# Patient Record
Sex: FEMALE | Race: WHITE | NOT HISPANIC OR LATINO | Employment: UNEMPLOYED | URBAN - NONMETROPOLITAN AREA
[De-identification: names, ages, dates, MRNs, and addresses within clinical notes are randomized per-mention and may not be internally consistent; named-entity substitution may affect disease eponyms.]

---

## 2017-04-07 RX ORDER — DICYCLOMINE HCL 20 MG
20 TABLET ORAL DAILY
COMMUNITY
End: 2018-12-19

## 2017-04-07 RX ORDER — MONTELUKAST SODIUM 4 MG/1
1 TABLET, CHEWABLE ORAL DAILY
Status: ON HOLD | COMMUNITY
End: 2017-04-14

## 2017-04-14 ENCOUNTER — ANESTHESIA EVENT (OUTPATIENT)
Dept: GASTROENTEROLOGY | Facility: HOSPITAL | Age: 72
End: 2017-04-14

## 2017-04-14 ENCOUNTER — ANESTHESIA (OUTPATIENT)
Dept: GASTROENTEROLOGY | Facility: HOSPITAL | Age: 72
End: 2017-04-14

## 2017-04-14 ENCOUNTER — HOSPITAL ENCOUNTER (OUTPATIENT)
Facility: HOSPITAL | Age: 72
Setting detail: HOSPITAL OUTPATIENT SURGERY
Discharge: HOME | End: 2017-04-14
Attending: INTERNAL MEDICINE | Admitting: INTERNAL MEDICINE

## 2017-04-14 VITALS
RESPIRATION RATE: 18 BRPM | DIASTOLIC BLOOD PRESSURE: 59 MMHG | TEMPERATURE: 97.3 F | HEIGHT: 64 IN | BODY MASS INDEX: 32.56 KG/M2 | SYSTOLIC BLOOD PRESSURE: 123 MMHG | WEIGHT: 190.7 LBS | HEART RATE: 60 BPM | OXYGEN SATURATION: 96 %

## 2017-04-14 DIAGNOSIS — R19.7 ABDOMINAL PAIN, VOMITING, AND DIARRHEA: ICD-10-CM

## 2017-04-14 DIAGNOSIS — R10.9 ABDOMINAL PAIN, VOMITING, AND DIARRHEA: ICD-10-CM

## 2017-04-14 DIAGNOSIS — R11.10 ABDOMINAL PAIN, VOMITING, AND DIARRHEA: ICD-10-CM

## 2017-04-14 PROCEDURE — 25010000002 FENTANYL CITRATE (PF) 100 MCG/2ML SOLUTION: Performed by: NURSE ANESTHETIST, CERTIFIED REGISTERED

## 2017-04-14 PROCEDURE — 25010000002 LEVOFLOXACIN PER 250 MG: Performed by: INTERNAL MEDICINE

## 2017-04-14 PROCEDURE — 25010000002 PROPOFOL 10 MG/ML EMULSION: Performed by: NURSE ANESTHETIST, CERTIFIED REGISTERED

## 2017-04-14 PROCEDURE — 88305 TISSUE EXAM BY PATHOLOGIST: CPT | Performed by: PATHOLOGY

## 2017-04-14 PROCEDURE — 88305 TISSUE EXAM BY PATHOLOGIST: CPT | Performed by: INTERNAL MEDICINE

## 2017-04-14 RX ORDER — LEVOFLOXACIN 5 MG/ML
INJECTION, SOLUTION INTRAVENOUS CONTINUOUS PRN
Status: DISCONTINUED | OUTPATIENT
Start: 2017-04-14 | End: 2017-04-14 | Stop reason: HOSPADM

## 2017-04-14 RX ORDER — PROPOFOL 10 MG/ML
VIAL (ML) INTRAVENOUS AS NEEDED
Status: DISCONTINUED | OUTPATIENT
Start: 2017-04-14 | End: 2017-04-14 | Stop reason: SURG

## 2017-04-14 RX ORDER — DEXTROSE AND SODIUM CHLORIDE 5; .45 G/100ML; G/100ML
30 INJECTION, SOLUTION INTRAVENOUS CONTINUOUS
Status: DISCONTINUED | OUTPATIENT
Start: 2017-04-14 | End: 2017-04-14 | Stop reason: HOSPADM

## 2017-04-14 RX ORDER — UREA 10 %
800 LOTION (ML) TOPICAL DAILY
COMMUNITY
End: 2018-12-19

## 2017-04-14 RX ORDER — FENTANYL CITRATE 50 UG/ML
INJECTION, SOLUTION INTRAMUSCULAR; INTRAVENOUS AS NEEDED
Status: DISCONTINUED | OUTPATIENT
Start: 2017-04-14 | End: 2017-04-14 | Stop reason: SURG

## 2017-04-14 RX ORDER — LEVOFLOXACIN 5 MG/ML
500 INJECTION, SOLUTION INTRAVENOUS ONCE
Status: DISCONTINUED | OUTPATIENT
Start: 2017-04-14 | End: 2017-04-14 | Stop reason: HOSPADM

## 2017-04-14 RX ORDER — LANOLIN ALCOHOL/MO/W.PET/CERES
1000 CREAM (GRAM) TOPICAL DAILY
COMMUNITY
End: 2018-12-19

## 2017-04-14 RX ADMIN — PROPOFOL 20 MG: 10 INJECTION, EMULSION INTRAVENOUS at 15:20

## 2017-04-14 RX ADMIN — PROPOFOL 60 MG: 10 INJECTION, EMULSION INTRAVENOUS at 15:13

## 2017-04-14 RX ADMIN — FENTANYL CITRATE 50 MCG: 50 INJECTION, SOLUTION INTRAMUSCULAR; INTRAVENOUS at 15:16

## 2017-04-14 RX ADMIN — DEXTROSE AND SODIUM CHLORIDE 30 ML/HR: 5; 450 INJECTION, SOLUTION INTRAVENOUS at 14:27

## 2017-04-14 RX ADMIN — PROPOFOL 20 MG: 10 INJECTION, EMULSION INTRAVENOUS at 15:17

## 2017-04-14 RX ADMIN — PROPOFOL 20 MG: 10 INJECTION, EMULSION INTRAVENOUS at 15:26

## 2017-04-14 RX ADMIN — PROPOFOL 20 MG: 10 INJECTION, EMULSION INTRAVENOUS at 15:23

## 2017-04-14 NOTE — PLAN OF CARE
Problem: Patient Care Overview (Adult)  Goal: Plan of Care Review  Outcome: Ongoing (interventions implemented as appropriate)    04/14/17 1529   Coping/Psychosocial Response Interventions   Plan Of Care Reviewed With patient   Patient Care Overview   Progress no change   Outcome Evaluation   Outcome Summary/Follow up Plan vss         Problem: GI Endoscopy (Adult)  Goal: Signs and Symptoms of Listed Potential Problems Will be Absent or Manageable (GI Endoscopy)  Outcome: Ongoing (interventions implemented as appropriate)    04/14/17 1529   GI Endoscopy   Problems Assessed (GI Endoscopy) all   Problems Present (GI Endoscopy) none

## 2017-04-14 NOTE — PLAN OF CARE
Problem: Patient Care Overview (Adult)  Goal: Plan of Care Review  Outcome: Outcome(s) achieved Date Met:  04/14/17 04/14/17 1541   Coping/Psychosocial Response Interventions   Plan Of Care Reviewed With patient   Patient Care Overview   Progress no change   Outcome Evaluation   Outcome Summary/Follow up Plan vss         Problem: GI Endoscopy (Adult)  Goal: Signs and Symptoms of Listed Potential Problems Will be Absent or Manageable (GI Endoscopy)  Outcome: Outcome(s) achieved Date Met:  04/14/17 04/14/17 1541   GI Endoscopy   Problems Assessed (GI Endoscopy) all   Problems Present (GI Endoscopy) none

## 2017-04-14 NOTE — H&P
Yaquelin Jordan DO,[facility]  Gastroenterology  Hepatology  Endoscopy  Board Certified in Internal Medicine and gastroenterology  [address], [address]  [place]. [address]  - [phone]   F - [phone]     GASTROENTEROLOGY HISTORY AND PHYSICAL  NOTE   YAQUELIN JORDAN DO.         SUBJECTIVE:   4/14/2017    Name: Abril Avilez  DOD: 1945        Chief Complaint:         Subjective : Diarrhea with changes in bowel habits     Patient is 72 y.o. female presents with desire for elective colonoscopy.  There has been some changes in bowel habits that have been occurring.  No bleeding.  No family history of colon cancer.      ROS/HISTORY/ CURRENT MEDICATIONS/OBJECTIVE/VS/PE:   Review of Systems:   Review of Systems    History:     Past Medical History:   Diagnosis Date   • Atrial fibrillation    • Bradycardia    • Hyperlipidemia    • Hypertension    • Nephropathy    • Shortness of breath      Past Surgical History:   Procedure Laterality Date   • CARDIAC SURGERY  01/01/2007    AVR   • CATARACT EXTRACTION WITH INTRAOCULAR LENS IMPLANT Bilateral    • CHOLECYSTECTOMY     • HYSTERECTOMY     • JOINT REPLACEMENT  01/01/2006   • KNEE SURGERY     • NECK SURGERY  2007   • PACEMAKER IMPLANTATION       History reviewed. No pertinent family history.  Social History   Substance Use Topics   • Smoking status: Never Smoker   • Smokeless tobacco: Never Used   • Alcohol use No      Comment: QUIT 50 YEARS AGO     Prescriptions Prior to Admission   Medication Sig Dispense Refill Last Dose   • atorvastatin (LIPITOR) 20 MG tablet Take 1 tablet by mouth Daily.   4/13/2017 at Unknown time   • dicyclomine (BENTYL) 20 MG tablet Take 20 mg by mouth Daily.   4/13/2017 at Unknown time   • folic acid (FOLVITE) 800 MCG tablet Take 800 mcg by mouth Daily.   4/13/2017 at Unknown time   • gabapentin (NEURONTIN) 300 MG capsule Take 1 capsule by mouth 3 (Three) Times a Day.   4/13/2017 at Unknown time   • sotalol (BETAPACE) 80 MG  tablet Take 1 tablet by mouth 2 (Two) Times a Day.   4/14/2017 at Unknown time   • spironolactone-hydrochlorothiazide (ALDACTAZIDE) 25-25 MG tablet Take 1 tablet by mouth Daily.   4/14/2017 at Unknown time   • vitamin B-12 (CYANOCOBALAMIN) 1000 MCG tablet Take 1,000 mcg by mouth Daily.   4/13/2017 at Unknown time   • aspirin 325 MG tablet Take 325 mg by mouth Daily.   4/11/2017     Allergies:  Hydroxyzine; Pravastatin; Ampicillin; and Penicillins    I have reviewed the patients medical history, surgical history and family history in the available medical record system.     Current Medications:     Current Facility-Administered Medications   Medication Dose Route Frequency Provider Last Rate Last Dose   • dextrose 5 % and sodium chloride 0.45 % infusion  30 mL/hr Intravenous Continuous Yuri Eaton,  30 mL/hr at 04/14/17 1427 30 mL/hr at 04/14/17 1427       Objective     Physical Exam:   Temp:  [98.4 °F (36.9 °C)] 98.4 °F (36.9 °C)  Heart Rate:  [60] 60  Resp:  [18] 18  BP: (178)/(83) 178/83    Physical Exam:  General Appearance:    Alert, cooperative, in no acute distress   Head:    Normocephalic, without obvious abnormality, atraumatic   Eyes:            Lids and lashes normal, conjunctivae and sclerae normal, no   icterus, no pallor, corneas clear, PERRLA   Ears:    Ears appear intact with no abnormalities noted   Throat:   No oral lesions, no thrush, oral mucosa moist   Neck:   No adenopathy, supple, trachea midline, no thyromegaly, no     carotid bruit, no JVD   Back:     No kyphosis present, no scoliosis present, no skin lesions,       erythema or scars, no tenderness to percussion or                   palpation,   range of motion normal   Lungs:     Clear to auscultation,respirations regular, even and                   unlabored    Heart:    Regular rhythm and normal rate, normal S1 and S2, no            murmur, no gallop, no rub, no click   Breast Exam:    Deferred   Abdomen:     Normal bowel sounds, no  masses, no organomegaly, soft        non-tender, non-distended, no guarding, no rebound                 tenderness   Genitalia:    Deferred   Extremities:   Moves all extremities well, no edema, no cyanosis, no              redness   Pulses:   Pulses palpable and equal bilaterally   Skin:   No bleeding, bruising or rash   Lymph nodes:   No palpable adenopathy   Neurologic:   Cranial nerves 2 - 12 grossly intact, sensation intact, DTR        present and equal bilaterally      Results Review:     No results found for: WBC, HGB, HCT, PLT          No results found for: LIPASE  No results found for: INR       Radiology Review:  Imaging Results (last 72 hours)     ** No results found for the last 72 hours. **           I reviewed the patient's new clinical results.  I reviewed the patient's new imaging results and agree with the interpretation.     ASSESSMENT/PLAN:   ASSESSMENT:   1.  Diarrhea, functional  2.  Changes in bowel habits    PLAN:   1.  Colonoscopy with biopsies    Risk and benefits associated with the procedure are reviewed with the patient.  She wishes to proceed      Yuri Eaton DO  04/14/17  2:42 PM

## 2017-04-14 NOTE — ANESTHESIA PREPROCEDURE EVALUATION
Anesthesia Evaluation     NPO Status: > 8 hours   Airway   Mallampati: II  TM distance: >3 FB  Neck ROM: full  no difficulty expected  Dental - normal exam     Pulmonary - normal exam   (+) shortness of breath,   Cardiovascular - normal exam    (+) pacemaker pacemaker, hypertension well controlled, dysrhythmias Atrial Fib,       Neuro/Psych  GI/Hepatic/Renal/Endo    (+)  chronic renal disease,     Musculoskeletal     Abdominal    Substance History      OB/GYN          Other                                    Anesthesia Plan    ASA 3     MAC     intravenous induction   Anesthetic plan and risks discussed with patient.

## 2017-04-14 NOTE — ANESTHESIA POSTPROCEDURE EVALUATION
Patient: Abril Avilez    Procedure Summary     Date Anesthesia Start Anesthesia Stop Room / Location    04/14/17 1508 1530 [facility] ENDOSCOPY 2 / [facility] ENDOSCOPY       Procedure Diagnosis Surgeon Provider    COLONOSCOPY (N/A ) Abdominal pain, vomiting, and diarrhea  (ABDOMINAL PAIN) DO Maria Dolores Ferris CRNA          Anesthesia Type: MAC  Last vitals  BP      Temp      Pulse     Resp      SpO2        Post Anesthesia Care and Evaluation    Patient location during evaluation: bedside  Patient participation: complete - patient participated  Level of consciousness: awake and alert  Pain score: 0  Pain management: adequate  Airway patency: patent  Anesthetic complications: No anesthetic complications  PONV Status: none  Cardiovascular status: acceptable  Respiratory status: acceptable  Hydration status: acceptable

## 2017-04-14 NOTE — DISCHARGE INSTR - APPOINTMENTS
Dr. Yuri Eaton, DO  [address]  [place] [address]  [phone]    Appointment date and time:  Call for appointment in 6 months.

## 2017-04-18 LAB
LAB AP CASE REPORT: NORMAL
Lab: NORMAL
PATH REPORT.FINAL DX SPEC: NORMAL
PATH REPORT.GROSS SPEC: NORMAL

## 2017-06-28 ENCOUNTER — CLINICAL SUPPORT (OUTPATIENT)
Dept: CARDIOLOGY | Facility: CLINIC | Age: 72
End: 2017-06-28

## 2017-06-28 DIAGNOSIS — I49.5 SSS (SICK SINUS SYNDROME) (HCC): Primary | ICD-10-CM

## 2017-06-28 PROCEDURE — 93288 INTERROG EVL PM/LDLS PM IP: CPT | Performed by: INTERNAL MEDICINE

## 2017-06-28 NOTE — PROGRESS NOTES
72 years old patient with history of sick sinus syndrome status post pacemaker implantation.  Manufact[employer] model number p.m. 2210 and serial number [id number].  Implantation date 9/6/2011 and date of interrogation 6/28/2017.  Battery voltage is good for 5-6 year.  Pacing the atrium about 90% and ventricle less than 5% of the time.  Pacing programmed DDD at 60 and 120.  AV delay to 75 PV delay 250.  Sensing P wave 2.5 within lead impedance 3:30 and threshold 1 at 0.5.  Sensing R-wave 3.6 with a lead impedance 410 and threshold 0.5 at 0.5.  Clinical impression normal function pacemaker recommend to follow up in 6 month

## 2017-08-17 ENCOUNTER — OFFICE VISIT (OUTPATIENT)
Dept: CARDIOLOGY | Facility: CLINIC | Age: 72
End: 2017-08-17

## 2017-08-17 VITALS
HEIGHT: 64 IN | SYSTOLIC BLOOD PRESSURE: 130 MMHG | HEART RATE: 60 BPM | DIASTOLIC BLOOD PRESSURE: 80 MMHG | WEIGHT: 181 LBS | BODY MASS INDEX: 30.9 KG/M2

## 2017-08-17 DIAGNOSIS — I48.0 PAROXYSMAL ATRIAL FIBRILLATION (HCC): Primary | ICD-10-CM

## 2017-08-17 DIAGNOSIS — R00.1 BRADYCARDIA: ICD-10-CM

## 2017-08-17 DIAGNOSIS — E78.00 PURE HYPERCHOLESTEROLEMIA: ICD-10-CM

## 2017-08-17 DIAGNOSIS — I10 ESSENTIAL HYPERTENSION: ICD-10-CM

## 2017-08-17 DIAGNOSIS — R06.02 SOB (SHORTNESS OF BREATH): ICD-10-CM

## 2017-08-17 PROCEDURE — 99214 OFFICE O/P EST MOD 30 MIN: CPT | Performed by: INTERNAL MEDICINE

## 2017-08-17 PROCEDURE — 93000 ELECTROCARDIOGRAM COMPLETE: CPT | Performed by: INTERNAL MEDICINE

## 2017-08-17 RX ORDER — MONTELUKAST SODIUM 4 MG/1
1 TABLET, CHEWABLE ORAL DAILY
COMMUNITY
End: 2018-12-19

## 2017-08-17 RX ORDER — AMLODIPINE BESYLATE 2.5 MG/1
2.5 TABLET ORAL DAILY
Qty: 30 TABLET | Refills: 11 | Status: SHIPPED | OUTPATIENT
Start: 2017-08-17 | End: 2018-09-10 | Stop reason: SDUPTHER

## 2017-08-17 NOTE — PROGRESS NOTES
[facility] Cardiology  OFFICE NOTE    Abril Avilez  72 y.o. female    08/17/2017  1. Paroxysmal atrial fibrillation    2. Bradycardia    3. Essential hypertension    4. Pure hypercholesterolemia    5. SOB (shortness of breath)        Chief complaint -Shortness of breath      History of present Illness- 72-year-old lady with history of ascending aortic aneurysm repair in 2010, doing reasonably well she has been noticing shortness of breath with significant generalized myalgia fever for the past 10 days.  She had like flulike symptoms he could be related to some kind of viral syndrome she is getting better.  She is going to the bathroom numerous times in the night so I decrease the dose of Aldactazide to half a tablet in the morning and added amlodipine 2.5 mg in the evening for blood pressure.  I will check an echo to assess his LV function and valvular function since she has history of ascending aortic root repair.  She denies any GI symptoms.              Allergies   Allergen Reactions   • Hydroxyzine      Caused migraine headache   • Milk-Related Compounds    • Pravastatin Other (See Comments)     UNKNOWN   • Sulfa Antibiotics Hives   • Ampicillin Rash   • Penicillins Rash         Past Medical History:   Diagnosis Date   • Atrial fibrillation    • Bradycardia    • Hyperlipidemia    • Hypertension    • Nephropathy    • Shortness of breath          Past Surgical History:   Procedure Laterality Date   • CARDIAC SURGERY  01/01/2007    AVR   • CATARACT EXTRACTION WITH INTRAOCULAR LENS IMPLANT Bilateral    • CHOLECYSTECTOMY     • COLONOSCOPY N/A 4/14/2017    Procedure: COLONOSCOPY;  Surgeon: Yuri Eaton DO;  Location: [facility] ENDOSCOPY;  Service:    • HYSTERECTOMY     • JOINT REPLACEMENT  01/01/2006   • KNEE SURGERY     • NECK SURGERY  2007   • PACEMAKER IMPLANTATION           Family History   Problem Relation Age of Onset   • Heart disease Mother    • Heart disease Father          Social  History     Social History   • Marital status:      Spouse name: N/A   • Number of children: N/A   • Years of education: N/A     Occupational History   • Not on file.     Social History Main Topics   • Smoking status: Never Smoker   • Smokeless tobacco: Never Used   • Alcohol use No      Comment: QUIT 50 YEARS AGO   • Drug use: No   • Sexual activity: Defer     Other Topics Concern   • Not on file     Social History Narrative         Current Outpatient Prescriptions   Medication Sig Dispense Refill   • aspirin 325 MG tablet Take 325 mg by mouth Daily.     • atorvastatin (LIPITOR) 20 MG tablet Take 1 tablet by mouth Daily.     • colestipol (COLESTID) 1 g tablet Take 1 g by mouth Daily.     • dicyclomine (BENTYL) 20 MG tablet Take 20 mg by mouth Daily.     • folic acid (FOLVITE) 800 MCG tablet Take 800 mcg by mouth Daily.     • gabapentin (NEURONTIN) 300 MG capsule Take 1 capsule by mouth 3 (Three) Times a Day.     • sotalol (BETAPACE) 80 MG tablet Take 1 tablet by mouth 2 (Two) Times a Day.     • spironolactone-hydrochlorothiazide (ALDACTAZIDE) 25-25 MG tablet Take 0.5 tablets by mouth Daily.     • vitamin B-12 (CYANOCOBALAMIN) 1000 MCG tablet Take 1,000 mcg by mouth Daily.     • amLODIPine (NORVASC) 2.5 MG tablet Take 1 tablet by mouth Daily. 30 tablet 11     No current facility-administered medications for this visit.          Review of Systems     Constitution: Denies any fatigue, fever or chills    HENT: Denies any headache, hearing impairment,     Eyes: Denies any blurring of vision, or photophobia     Cardivascular - As per history of present illness     Respiratory system-denies any COPD, shortness of breath,   sleep apnea.     Endocrine:  Hyperlipidemia       Musculoskeletal:   history of arthritis with musculoskeletal problems    Gastrointestinal: No nausea, vomiting, or melena    Genitourinary: No dysuria or hematuria    Neurological:   Peripheral neuropathy    Psychiatric/Behavioral:        No  "history of depression,  No history of bipolar disorder or schizophrenia     Hematological- no history of easy bruising or any bleeding diathesis            OBJECTIVE    /80  Pulse 60  Ht 64\" (162.6 cm)  Wt 181 lb (82.1 kg)  BMI 31.07 kg/m2      Physical Exam     Constitutional: is oriented to person, place, and time.     Skin-warm and dry    Well developed and nourished in no acute distress      Head: Normocephalic and atraumatic.     Eyes: Pupils are equal, round, and reactive to light.     Neck: Neck supple. No bruit in the carotids,    Cardiovascular: [place] in the fifth intercostal space   Regular rate, and  Rhythm,    S1 greater than S2, no S3 or S4, no gallop     Pulmonary/Chest:   Air  Entry is equal on both sides  No wheezing or crackles,      Abdominal: Soft.  No hepatosplenomegaly    Musculoskeletal: No kyphoscoliosis,     Neurological: is alert and oriented to person, place, and time.    cranial nerve are intact .   No motor or sensory deficit    Extremities-no edema, no radial femoral delay      Psychiatric: He has a normal mood and affect.                  His behavior is normal.             ECG 12 Lead  Date/Time: 8/17/2017 9:14 AM  Performed by: ANKIT LAND  Authorized by: ANKIT LAND   Comparison: not compared with previous ECG   Comments: Atrial paced rhythm and ventricular sensed rhythm              A/P    Status post ascending aortic aneurysm repair with no CAD in 2010, now having some shortness of breath will check an echo to assess LV function and valvular function.    Shortness of breath, hypertension decrease the dose of Aldactazide to half a tablet as she has to go to the bathroom numerous times and added amlodipine 2.5 mg in the evening.    Hyperlipidemia continue atorvastatin 20 mg daily.    sick sinus syndrome status post dual-chamber pacemaker, on sotalol for paroxysmal atrial fibrillation and remains in sinus rhythm.    Follow-up in 6 months              This " document has been electronically signed by Pernell Gallegos MD on August 17, 2017 9:12 AM       EMR Dragon/Transcription disclaimer:   Some of this note may be an electronic transcription/translation of spoken language to printed text. The electronic translation of spoken language may permit erroneous, or at times, nonsensical words or phrases to be inadvertently transcribed; Although I have reviewed the note for such errors, some may still exist.

## 2017-09-08 LAB
BH CV ECHO MEAS - ACS: 1.6 CM
BH CV ECHO MEAS - AI DEC SLOPE: 227 CM/SEC^2
BH CV ECHO MEAS - AI MAX PG: 50 MMHG
BH CV ECHO MEAS - AI MAX VEL: 353.5 CM/SEC
BH CV ECHO MEAS - AI P1/2T: 456.1 MSEC
BH CV ECHO MEAS - AO MAX PG (FULL): 7.8 MMHG
BH CV ECHO MEAS - AO MAX PG: 12.1 MMHG
BH CV ECHO MEAS - AO MEAN PG (FULL): 4 MMHG
BH CV ECHO MEAS - AO MEAN PG: 6 MMHG
BH CV ECHO MEAS - AO ROOT AREA (BSA CORRECTED): 1.9
BH CV ECHO MEAS - AO ROOT AREA: 10.2 CM^2
BH CV ECHO MEAS - AO ROOT DIAM: 3.6 CM
BH CV ECHO MEAS - AO V2 MAX: 174 CM/SEC
BH CV ECHO MEAS - AO V2 MEAN: 110 CM/SEC
BH CV ECHO MEAS - AO V2 VTI: 42.3 CM
BH CV ECHO MEAS - BSA(HAYCOCK): 2 M^2
BH CV ECHO MEAS - BSA: 1.9 M^2
BH CV ECHO MEAS - BZI_BMI: 31.1 KILOGRAMS/M^2
BH CV ECHO MEAS - BZI_METRIC_HEIGHT: 162.6 CM
BH CV ECHO MEAS - BZI_METRIC_WEIGHT: 82.1 KG
BH CV ECHO MEAS - EDV(CUBED): 110.6 ML
BH CV ECHO MEAS - EDV(TEICH): 107.5 ML
BH CV ECHO MEAS - EF(CUBED): 67.5 %
BH CV ECHO MEAS - EF(TEICH): 59 %
BH CV ECHO MEAS - EPSS: 0.7 CM
BH CV ECHO MEAS - ESV(CUBED): 35.9 ML
BH CV ECHO MEAS - ESV(TEICH): 44.1 ML
BH CV ECHO MEAS - FS: 31.3 %
BH CV ECHO MEAS - IVS/LVPW: 0.93
BH CV ECHO MEAS - IVSD: 1.3 CM
BH CV ECHO MEAS - LA DIMENSION: 4.6 CM
BH CV ECHO MEAS - LA/AO: 1.3
BH CV ECHO MEAS - LV MASS(C)D: 259.6 GRAMS
BH CV ECHO MEAS - LV MASS(C)DI: 138.5 GRAMS/M^2
BH CV ECHO MEAS - LV MAX PG: 4.3 MMHG
BH CV ECHO MEAS - LV MEAN PG: 2 MMHG
BH CV ECHO MEAS - LV V1 MAX: 104 CM/SEC
BH CV ECHO MEAS - LV V1 MEAN: 70.3 CM/SEC
BH CV ECHO MEAS - LV V1 VTI: 25.9 CM
BH CV ECHO MEAS - LVIDD: 4.8 CM
BH CV ECHO MEAS - LVIDS: 3.3 CM
BH CV ECHO MEAS - LVPWD: 1.4 CM
BH CV ECHO MEAS - MR MAX PG: 86.1 MMHG
BH CV ECHO MEAS - MR MAX VEL: 464 CM/SEC
BH CV ECHO MEAS - MV A MAX VEL: 65.2 CM/SEC
BH CV ECHO MEAS - MV E MAX VEL: 93.8 CM/SEC
BH CV ECHO MEAS - MV E/A: 1.4
BH CV ECHO MEAS - PA MAX PG: 3.2 MMHG
BH CV ECHO MEAS - PA MEAN PG: 2 MMHG
BH CV ECHO MEAS - PA V2 MAX: 89.6 CM/SEC
BH CV ECHO MEAS - PA V2 MEAN: 67.6 CM/SEC
BH CV ECHO MEAS - PA V2 VTI: 25.2 CM
BH CV ECHO MEAS - PI END-D VEL: 137 CM/SEC
BH CV ECHO MEAS - RAP SYSTOLE: 10 MMHG
BH CV ECHO MEAS - RVDD: 2.8 CM
BH CV ECHO MEAS - RVSP: 44.3 MMHG
BH CV ECHO MEAS - SI(AO): 229.7 ML/M^2
BH CV ECHO MEAS - SI(CUBED): 39.8 ML/M^2
BH CV ECHO MEAS - SI(TEICH): 33.8 ML/M^2
BH CV ECHO MEAS - SV(AO): 430.6 ML
BH CV ECHO MEAS - SV(CUBED): 74.7 ML
BH CV ECHO MEAS - SV(TEICH): 63.4 ML
BH CV ECHO MEAS - TR MAX VEL: 293 CM/SEC

## 2017-09-12 ENCOUNTER — DOCUMENTATION (OUTPATIENT)
Dept: CARDIOLOGY | Facility: CLINIC | Age: 72
End: 2017-09-12

## 2017-12-20 ENCOUNTER — CLINICAL SUPPORT (OUTPATIENT)
Dept: CARDIOLOGY | Facility: CLINIC | Age: 72
End: 2017-12-20

## 2017-12-20 DIAGNOSIS — Z95.0 PRESENCE OF CARDIAC PACEMAKER: ICD-10-CM

## 2017-12-20 DIAGNOSIS — I49.5 SSS (SICK SINUS SYNDROME) (HCC): Primary | ICD-10-CM

## 2017-12-20 PROCEDURE — 93288 INTERROG EVL PM/LDLS PM IP: CPT | Performed by: NURSE PRACTITIONER

## 2018-04-19 ENCOUNTER — OFFICE VISIT (OUTPATIENT)
Dept: CARDIOLOGY | Facility: CLINIC | Age: 73
End: 2018-04-19

## 2018-04-19 VITALS
HEIGHT: 64 IN | HEART RATE: 62 BPM | BODY MASS INDEX: 30.9 KG/M2 | DIASTOLIC BLOOD PRESSURE: 82 MMHG | WEIGHT: 181 LBS | SYSTOLIC BLOOD PRESSURE: 130 MMHG

## 2018-04-19 DIAGNOSIS — I49.5 SSS (SICK SINUS SYNDROME) (HCC): ICD-10-CM

## 2018-04-19 DIAGNOSIS — I48.0 PAROXYSMAL ATRIAL FIBRILLATION (HCC): Primary | ICD-10-CM

## 2018-04-19 DIAGNOSIS — I10 ESSENTIAL HYPERTENSION: ICD-10-CM

## 2018-04-19 PROCEDURE — 99214 OFFICE O/P EST MOD 30 MIN: CPT | Performed by: INTERNAL MEDICINE

## 2018-04-19 NOTE — PROGRESS NOTES
[facility] Cardiology  OFFICE NOTE    Abril Avilez  73 y.o. female    04/19/2018  1. Paroxysmal atrial fibrillation    2. Essential hypertension    3. SSS (sick sinus syndrome)        Chief complaint -Follow-up atrial fibrillation      History of present Illness- 73-year-old lady with history of ascending aortic aneurysm repair in 2010, doing reasonably well .  Her blood pressure is well controlled and she had a pacemaker checked in December and they were functioning okay.  She takes cholesterol medicine and blood pressure medicines with no problems.  She stays active.  No headache or visual complaints she had an echo last year and that was unremarkable.            Allergies   Allergen Reactions   • Hydroxyzine      Caused migraine headache   • Milk-Related Compounds    • Pravastatin Other (See Comments)     UNKNOWN   • Sulfa Antibiotics Hives   • Ampicillin Rash   • Penicillins Rash         Past Medical History:   Diagnosis Date   • Atrial fibrillation    • Bradycardia    • Hyperlipidemia    • Hypertension    • Nephropathy    • Shortness of breath          Past Surgical History:   Procedure Laterality Date   • CARDIAC SURGERY  01/01/2007    AVR   • CATARACT EXTRACTION WITH INTRAOCULAR LENS IMPLANT Bilateral    • CHOLECYSTECTOMY     • COLONOSCOPY N/A 4/14/2017    Procedure: COLONOSCOPY;  Surgeon: Yuri Eaton DO;  Location: [facility] ENDOSCOPY;  Service:    • HYSTERECTOMY     • JOINT REPLACEMENT  01/01/2006   • KNEE SURGERY     • NECK SURGERY  2007   • PACEMAKER IMPLANTATION           Family History   Problem Relation Age of Onset   • Heart disease Mother    • Heart disease Father          Social History     Social History   • Marital status:      Spouse name: N/A   • Number of children: N/A   • Years of education: N/A     Occupational History   • Not on file.     Social History Main Topics   • Smoking status: Never Smoker   • Smokeless tobacco: Never Used   • Alcohol use No       "Comment: QUIT 50 YEARS AGO   • Drug use: No   • Sexual activity: Defer     Other Topics Concern   • Not on file     Social History Narrative   • No narrative on file         Current Outpatient Prescriptions   Medication Sig Dispense Refill   • amLODIPine (NORVASC) 2.5 MG tablet Take 1 tablet by mouth Daily. 30 tablet 11   • aspirin 325 MG tablet Take 325 mg by mouth Daily.     • atorvastatin (LIPITOR) 20 MG tablet Take 1 tablet by mouth Daily.     • colestipol (COLESTID) 1 g tablet Take 1 g by mouth Daily.     • dicyclomine (BENTYL) 20 MG tablet Take 20 mg by mouth Daily.     • folic acid (FOLVITE) 800 MCG tablet Take 800 mcg by mouth Daily.     • gabapentin (NEURONTIN) 300 MG capsule Take 1 capsule by mouth 3 (Three) Times a Day.     • sotalol (BETAPACE) 80 MG tablet Take 1 tablet by mouth 2 (Two) Times a Day.     • spironolactone-hydrochlorothiazide (ALDACTAZIDE) 25-25 MG tablet Take 0.5 tablets by mouth Daily.     • vitamin B-12 (CYANOCOBALAMIN) 1000 MCG tablet Take 1,000 mcg by mouth Daily.       No current facility-administered medications for this visit.          Review of Systems     Constitution: Denies any fatigue, fever or chills    HENT: Denies any headache, hearing impairment,     Eyes: Denies any blurring of vision, or photophobia     Cardivascular - As per history of present illness     Respiratory system-denies any COPD, shortness of breath.     Endocrine:  Hyperlipidemia       Musculoskeletal:   history of arthritis with musculoskeletal problems    Gastrointestinal: No nausea, vomiting, or melena    Genitourinary: No dysuria or hematuria    Neurological:   Peripheral neuropathy    Psychiatric/Behavioral:        No history of depression    Hematological- no history of easy bruising or any bleeding diathesis            OBJECTIVE    /82   Pulse 62   Ht 162.6 cm (64.02\")   Wt 82.1 kg (181 lb)   BMI 31.05 kg/m²       Physical Exam     Constitutional: is oriented to person, place, and time. "     Skin-warm and dry    Well developed and nourished in no acute distress      Head: Normocephalic and atraumatic.     Eyes: Pupils are equal, round, and reactive to light.     Neck: Neck supple. No bruit in the carotids,    Cardiovascular: [place] in the fifth intercostal space   Regular rate, and  Rhythm,    S1 greater than S2, no S3 or S4, no gallop     Pulmonary/Chest:   Air  Entry is equal on both sides  No wheezing or crackles,      Abdominal: Soft.  No hepatosplenomegaly    Musculoskeletal: No kyphoscoliosis,     Neurological: is alert and oriented to person, place, and time.    cranial nerve are intact .   No motor or sensory deficit    Extremities-no edema, no radial femoral delay      Psychiatric: He has a normal mood and affect.                  His behavior is normal.           Procedures      A/P    Status post ascending aortic aneurysm repair with no CAD in 2010,  doing well no chest pain or shortness of breath    Hypertension well controlled with amlodipine, Aldactazide and sotalol    Hyperlipidemia continue atorvastatin 20 mg daily.  Her last blood work was in April 2017 will give an order for all the blood work    sick sinus syndrome status post dual-chamber pacemaker, on sotalol for paroxysmal atrial fibrillation and remains in sinus rhythm.    Follow-up in 8 months              This document has been electronically signed by Pernell Gallegos MD on April 19, 2018 9:43 AM       EMR Dragon/Transcription disclaimer:   Some of this note may be an electronic transcription/translation of spoken language to printed text. The electronic translation of spoken language may permit erroneous, or at times, nonsensical words or phrases to be inadvertently transcribed; Although I have reviewed the note for such errors, some may still exist.

## 2018-04-26 ENCOUNTER — APPOINTMENT (OUTPATIENT)
Dept: LAB | Facility: HOSPITAL | Age: 73
End: 2018-04-26

## 2018-04-26 LAB
ALBUMIN SERPL-MCNC: 4.7 G/DL (ref 3.4–4.8)
ALBUMIN/GLOB SERPL: 1.4 G/DL (ref 1.1–1.8)
ALP SERPL-CCNC: 77 U/L (ref 38–126)
ALT SERPL W P-5'-P-CCNC: 31 U/L (ref 9–52)
ANION GAP SERPL CALCULATED.3IONS-SCNC: 12 MMOL/L (ref 5–15)
ARTICHOKE IGE QN: 72 MG/DL (ref 1–129)
AST SERPL-CCNC: 26 U/L (ref 14–36)
BILIRUB SERPL-MCNC: 1.3 MG/DL (ref 0.2–1.3)
BUN BLD-MCNC: 21 MG/DL (ref 7–21)
BUN/CREAT SERPL: 24.4 (ref 7–25)
CALCIUM SPEC-SCNC: 9.7 MG/DL (ref 8.4–10.2)
CHLORIDE SERPL-SCNC: 100 MMOL/L (ref 95–110)
CHOLEST SERPL-MCNC: 169 MG/DL (ref 0–199)
CO2 SERPL-SCNC: 31 MMOL/L (ref 22–31)
CREAT BLD-MCNC: 0.86 MG/DL (ref 0.5–1)
DEPRECATED RDW RBC AUTO: 42.5 FL (ref 36.4–46.3)
ERYTHROCYTE [DISTWIDTH] IN BLOOD BY AUTOMATED COUNT: 12.6 % (ref 11.5–14.5)
GFR SERPL CREATININE-BSD FRML MDRD: 65 ML/MIN/1.73 (ref 39–90)
GLOBULIN UR ELPH-MCNC: 3.3 GM/DL (ref 2.3–3.5)
GLUCOSE BLD-MCNC: 103 MG/DL (ref 60–100)
HCT VFR BLD AUTO: 39.4 % (ref 35–45)
HDLC SERPL-MCNC: 61 MG/DL (ref 60–200)
HGB BLD-MCNC: 13.9 G/DL (ref 12–15.5)
LDLC/HDLC SERPL: 1.49 {RATIO} (ref 0–3.22)
MCH RBC QN AUTO: 32.5 PG (ref 26.5–34)
MCHC RBC AUTO-ENTMCNC: 35.3 G/DL (ref 31.4–36)
MCV RBC AUTO: 92.1 FL (ref 80–98)
PLATELET # BLD AUTO: 202 10*3/MM3 (ref 150–450)
PMV BLD AUTO: 10.8 FL (ref 8–12)
POTASSIUM BLD-SCNC: 4.1 MMOL/L (ref 3.5–5.1)
PROT SERPL-MCNC: 8 G/DL (ref 6.3–8.6)
RBC # BLD AUTO: 4.28 10*6/MM3 (ref 3.77–5.16)
SODIUM BLD-SCNC: 143 MMOL/L (ref 137–145)
T4 FREE SERPL-MCNC: 1.5 NG/DL (ref 0.78–2.19)
TRIGL SERPL-MCNC: 87 MG/DL (ref 20–199)
TSH SERPL DL<=0.05 MIU/L-ACNC: 1.89 MIU/ML (ref 0.46–4.68)
WBC NRBC COR # BLD: 5.84 10*3/MM3 (ref 3.2–9.8)

## 2018-04-26 PROCEDURE — 84443 ASSAY THYROID STIM HORMONE: CPT | Performed by: INTERNAL MEDICINE

## 2018-04-26 PROCEDURE — 84439 ASSAY OF FREE THYROXINE: CPT | Performed by: INTERNAL MEDICINE

## 2018-04-26 PROCEDURE — 85027 COMPLETE CBC AUTOMATED: CPT | Performed by: INTERNAL MEDICINE

## 2018-04-26 PROCEDURE — 36415 COLL VENOUS BLD VENIPUNCTURE: CPT | Performed by: INTERNAL MEDICINE

## 2018-04-26 PROCEDURE — 80061 LIPID PANEL: CPT | Performed by: INTERNAL MEDICINE

## 2018-04-26 PROCEDURE — 80053 COMPREHEN METABOLIC PANEL: CPT | Performed by: INTERNAL MEDICINE

## 2018-06-27 ENCOUNTER — CLINICAL SUPPORT (OUTPATIENT)
Dept: CARDIOLOGY | Facility: CLINIC | Age: 73
End: 2018-06-27

## 2018-06-27 DIAGNOSIS — Z95.0 PRESENCE OF CARDIAC PACEMAKER: ICD-10-CM

## 2018-06-27 DIAGNOSIS — I49.5 SSS (SICK SINUS SYNDROME) (HCC): Primary | ICD-10-CM

## 2018-06-27 PROCEDURE — 93288 INTERROG EVL PM/LDLS PM IP: CPT | Performed by: NURSE PRACTITIONER

## 2018-06-27 NOTE — PROGRESS NOTES
Pacemaker Evaluation Report    June 27, 2018    Primary Cardiologist: Dr. Gallegos  Implanting MD: Dr. Arroyo  :[employer] Model: 2210 Serial Number: [id number]  Implant date: 9/6/2011     Reason for evaluation:routine  Office    PPM  Cardiac device indication(s):sinus node dysfunction/SSS    Battery  CARLOS: 4 yrs, 2.86V     Interrogation Results  Atrial sensing: P wave: 0.3 mV  Atrial capture: 0.87 V @ 0.5 ms   Atrial lead impedance: 290 ohms  Ventricular sensing: R wave: 2.9 mV  Ventricular capture: 1.0 V @ 0.5 ms  Ventricular lead impedance: right  340 ohms    Parameters  Mode: DDDR  Base Rate: 60/120    Diagnostic Data  Atrial paced: 96 % Ventricular paced: 3 %  Mode switch: 1%  AT/AF [place]: <1%  AHR: 12,181, longest 2 m 26 sec on 5/13/18,  Appears to be noise (1 Printed)  VHR: 56 appear to be noise    Changes made: Noise reversion was previously on, no changes    Conclusions: normal device function and Follow up in 6 months    Assessment:  1. SSS (sick sinus syndrome)    2. Presence of cardiac pacemaker              This document has been electronically signed by LEONOR Kinsey on June 27, 2018 4:43 PM

## 2018-09-10 RX ORDER — AMLODIPINE BESYLATE 2.5 MG/1
TABLET ORAL
Qty: 30 TABLET | Refills: 6 | Status: SHIPPED | OUTPATIENT
Start: 2018-09-10 | End: 2019-04-22 | Stop reason: SDUPTHER

## 2018-12-19 ENCOUNTER — OFFICE VISIT (OUTPATIENT)
Dept: CARDIOLOGY | Facility: CLINIC | Age: 73
End: 2018-12-19

## 2018-12-19 VITALS
WEIGHT: 179.9 LBS | SYSTOLIC BLOOD PRESSURE: 118 MMHG | HEART RATE: 60 BPM | HEIGHT: 64 IN | DIASTOLIC BLOOD PRESSURE: 80 MMHG | BODY MASS INDEX: 30.71 KG/M2 | OXYGEN SATURATION: 99 %

## 2018-12-19 DIAGNOSIS — I49.5 SSS (SICK SINUS SYNDROME) (HCC): Primary | ICD-10-CM

## 2018-12-19 DIAGNOSIS — I10 ESSENTIAL HYPERTENSION: ICD-10-CM

## 2018-12-19 DIAGNOSIS — E78.00 PURE HYPERCHOLESTEROLEMIA: ICD-10-CM

## 2018-12-19 DIAGNOSIS — I48.0 PAROXYSMAL ATRIAL FIBRILLATION (HCC): ICD-10-CM

## 2018-12-19 PROCEDURE — 99214 OFFICE O/P EST MOD 30 MIN: CPT | Performed by: INTERNAL MEDICINE

## 2018-12-19 RX ORDER — ASPIRIN 81 MG/1
81 TABLET ORAL 2 TIMES DAILY
COMMUNITY

## 2018-12-19 NOTE — PROGRESS NOTES
[facility]  OFFICE NOTE    Abril Avilez  73 y.o. female    12/19/2018  1. SSS (sick sinus syndrome) (CMS/HCC)    2. Essential hypertension    3. Pure hypercholesterolemia    4. Paroxysmal atrial fibrillation (CMS/HCC)        Chief complaint -Follow-up atrial fibrillation      History of present Illness- 73-year-old lady with history of ascending aortic aneurysm repair in 2010, doing reasonably well .  Her blood pressure is well controlled and she had a pacemaker checked  and they were functioning okay.  She takes cholesterol medicine and blood pressure medicines with no problems.  She stays active.  No headache or visual complaints she had an echo last year and that was unremarkable.  She has been having some problems with her neck and she lies on left side with possibly cervical spondylosis I offered her to refer to her neurosurgeon            Allergies   Allergen Reactions   • Hydroxyzine      Caused migraine headache   • Milk-Related Compounds    • Pravastatin Other (See Comments)     UNKNOWN   • Sulfa Antibiotics Hives   • Ampicillin Rash   • Penicillins Rash         Past Medical History:   Diagnosis Date   • Atrial fibrillation (CMS/HCC)    • Bradycardia    • Hyperlipidemia    • Hypertension    • Nephropathy    • Shortness of breath          Past Surgical History:   Procedure Laterality Date   • CARDIAC SURGERY  01/01/2007    AVR   • CATARACT EXTRACTION WITH INTRAOCULAR LENS IMPLANT Bilateral    • CHOLECYSTECTOMY     • COLONOSCOPY N/A 4/14/2017    Procedure: COLONOSCOPY;  Surgeon: Yuri Eaton DO;  Location: [facility] ENDOSCOPY;  Service:    • HYSTERECTOMY     • JOINT REPLACEMENT  01/01/2006   • KNEE SURGERY     • NECK SURGERY  2007   • PACEMAKER IMPLANTATION           Family History   Problem Relation Age of Onset   • Heart disease Mother    • Heart disease Father          Social History     Socioeconomic History   • Marital status:      Spouse name: Not on file    • Number of children: Not on file   • Years of education: Not on file   • Highest education level: Not on file   Social Needs   • Financial resource strain: Not on file   • Food insecurity - worry: Not on file   • Food insecurity - inability: Not on file   • Transportation needs - medical: Not on file   • Transportation needs - non-medical: Not on file   Occupational History   • Not on file   Tobacco Use   • Smoking status: Never Smoker   • Smokeless tobacco: Never Used   Substance and Sexual Activity   • Alcohol use: No     Comment: QUIT 50 YEARS AGO   • Drug use: No   • Sexual activity: Defer   Other Topics Concern   • Not on file   Social History Narrative   • Not on file         Current Outpatient Medications   Medication Sig Dispense Refill   • amLODIPine (NORVASC) 2.5 MG tablet TAKE ONE TABLET BY MOUTH ONCE DAILY 30 tablet 6   • aspirin 81 MG EC tablet Take 81 mg by mouth Daily.     • atorvastatin (LIPITOR) 20 MG tablet Take 1 tablet by mouth Daily.     • sotalol (BETAPACE) 80 MG tablet Take 1 tablet by mouth 2 (Two) Times a Day.     • spironolactone-hydrochlorothiazide (ALDACTAZIDE) 25-25 MG tablet Take 0.5 tablets by mouth Daily.       No current facility-administered medications for this visit.          Review of Systems     Constitution: Denies any fatigue, fever or chills    HENT: Denies any headache, hearing impairment,     Eyes: Denies any blurring of vision, or photophobia     Cardivascular - As per history of present illness     Respiratory system-denies any COPD, shortness of breath.     Endocrine:  Hyperlipidemia       Musculoskeletal:   history of arthritis with musculoskeletal problems    Gastrointestinal: No nausea, vomiting, or melena    Genitourinary: No dysuria or hematuria    Neurological:   Peripheral neuropathy    Psychiatric/Behavioral:        No history of depression    Hematological- no history of easy bruising or any bleeding diathesis            OBJECTIVE    /80   Pulse 60    "Ht 162.6 cm (64.02\")   Wt 81.6 kg (179 lb 14.4 oz)   SpO2 99%   BMI 30.86 kg/m²       Physical Exam     Constitutional: is oriented to person, place, and time.     Skin-warm and dry    Well developed and nourished in no acute distress      Head: Normocephalic and atraumatic.     Eyes: Pupils are equal, round, and reactive to light.     Neck: Neck supple. No bruit in the carotids,    Cardiovascular: Evans in the fifth intercostal space   Regular rate, and  Rhythm,    S1 greater than S2, no S3 or S4, no gallop     Pulmonary/Chest:   Air  Entry is equal on both sides  No wheezing or crackles,      Abdominal: Soft.  No hepatosplenomegaly    Musculoskeletal: No kyphoscoliosis,     Neurological: is alert and oriented to person, place, and time.    cranial nerve are intact .   No motor or sensory deficit    Extremities-no edema, no radial femoral delay      Psychiatric: He has a normal mood and affect.                  His behavior is normal.           Procedures      A/P    Status post ascending aortic aneurysm repair with no CAD in 2010,  doing well no chest pain or shortness of breath.  If she needs any neck surgery she can proceed with moderate risk by ACC AHA guidelines and she does not need any further cardiac workup    Hypertension well controlled with amlodipine, Aldactazide and sotalol    Hyperlipidemia continue atorvastatin 20 mg daily.  Her last blood work was in April 2018 and they were okay    sick sinus syndrome status post dual-chamber pacemaker, on sotalol for paroxysmal atrial fibrillation and remains in sinus rhythm.    Follow-up in 8 months              This document has been electronically signed by Pernell Gallegos MD on December 19, 2018 2:19 PM       EMR Dragon/Transcription disclaimer:   Some of this note may be an electronic transcription/translation of spoken language to printed text. The electronic translation of spoken language may permit erroneous, or at times, nonsensical words or " phrases to be inadvertently transcribed; Although I have reviewed the note for such errors, some may still exist.

## 2019-01-15 PROCEDURE — 93294 REM INTERROG EVL PM/LDLS PM: CPT | Performed by: NURSE PRACTITIONER

## 2019-01-17 ENCOUNTER — CLINICAL SUPPORT (OUTPATIENT)
Dept: CARDIOLOGY | Facility: CLINIC | Age: 74
End: 2019-01-17

## 2019-01-17 DIAGNOSIS — I49.5 SSS (SICK SINUS SYNDROME) (HCC): Primary | ICD-10-CM

## 2019-01-17 DIAGNOSIS — Z95.0 PRESENCE OF CARDIAC PACEMAKER: ICD-10-CM

## 2019-01-17 PROCEDURE — 93296 REM INTERROG EVL PM/IDS: CPT | Performed by: NURSE PRACTITIONER

## 2019-01-17 NOTE — PROGRESS NOTES
Pacemaker Evaluation Report    January 17, 2019    Primary Cardiologist: Dr. Gallegos  Implanting MD: Dr. Arroyo  :[employer] Model: 2210 Serial Number: [id number]  Implant date: 9/6/2011     Reason for evaluation:routine remote   PPM  Cardiac device indication(s):sinus node dysfunction/SSS    Battery  CARLOS: 3.6 yrs, 2.84V     Interrogation Results  Atrial sensing: P wave: 1.1 mV  Atrial capture: 0.75 V @ 0.5 ms   Atrial lead impedance: 330 ohms  Ventricular sensing: R wave: 3.4 mV  Ventricular capture: 1.125 V @ 0.5 ms  Ventricular lead impedance: right  360 ohms    Parameters  Mode: DDDR  Base Rate: 60/120    Diagnostic Data  Atrial paced: 96 % Ventricular paced: 3.2 %  Mode switch:< 1%  AT/AF [place]:1.1%  AHR: 10,837 counts ( appears to be noise)  VHR: 58 appear to be noise    Changes made: No changes    Conclusions: normal device function and Follow up in 6 months   Lead noise noted on previous encounters    Assessment:  1. SSS (sick sinus syndrome) (CMS/HCC)    2. Presence of cardiac pacemaker              This document has been electronically signed by LEONOR Marroquin on January 17, 2019 4:03 PM          This document has been electronically signed by LEONOR Marroquin on January 17, 2019 4:03 PM

## 2019-04-22 RX ORDER — AMLODIPINE BESYLATE 2.5 MG/1
TABLET ORAL
Qty: 30 TABLET | Refills: 6 | Status: SHIPPED | OUTPATIENT
Start: 2019-04-22 | End: 2019-12-26

## 2019-07-24 ENCOUNTER — CLINICAL SUPPORT (OUTPATIENT)
Dept: CARDIOLOGY | Facility: CLINIC | Age: 74
End: 2019-07-24

## 2019-07-24 DIAGNOSIS — I49.5 SSS (SICK SINUS SYNDROME) (HCC): Primary | ICD-10-CM

## 2019-07-24 DIAGNOSIS — Z95.0 PRESENCE OF CARDIAC PACEMAKER: ICD-10-CM

## 2019-07-24 DIAGNOSIS — I48.0 PAROXYSMAL ATRIAL FIBRILLATION (HCC): ICD-10-CM

## 2019-07-24 PROCEDURE — 93288 INTERROG EVL PM/LDLS PM IP: CPT | Performed by: NURSE PRACTITIONER

## 2019-07-24 NOTE — PROGRESS NOTES
Pacemaker Evaluation Report    July 24, 2019    Primary Cardiologist: Dr. Gallegos  Implanting MD: Dr. Arroyo  :[employer] Model: 2210 Serial Number: [id number]  Implant date: 9/6/2011     Reason for evaluation:routine remote   PPM  Cardiac device indication(s):sinus node dysfunction/SSS    Battery  CARLOS: 2.6 yrs, 2.84V     Interrogation Results  Atrial sensing: P wave: 2.3 mV  Atrial capture: 0.75 V @ 0.5 ms   Atrial lead impedance: 280 ohms  Ventricular sensing: R wave: 2.5 mV  Ventricular capture: 1.125 V @ 0.5 ms  Ventricular lead impedance: right  330 ohms    Parameters  Mode: DDDR  Base Rate: 60/120    Diagnostic Data  Atrial paced: 95 %  Ventricular paced: 4 %  Mode switch:< 1%  AT/AF [place]:1.4%  AHR: 572 counts ( appears to be noise)  VHR: 2 appear to be noise  Noise reversion 517    Changes made: No changes    Conclusions: normal device function and Follow up in 6 months   Lead noise noted on previous encounters    Assessment:  1. SSS (sick sinus syndrome) (CMS/HCC)    2. Presence of cardiac pacemaker    3. Paroxysmal atrial fibrillation (CMS/HCC)    - NO AC indicated. Remains NSR on sotalol. Will monitor. Cannot find documentation of contraindication.           This document has been electronically signed by LEONOR Kinsey on July 24, 2019 9:45 AM

## 2019-08-20 ENCOUNTER — OFFICE VISIT (OUTPATIENT)
Dept: CARDIOLOGY | Facility: CLINIC | Age: 74
End: 2019-08-20

## 2019-08-20 VITALS
WEIGHT: 174 LBS | DIASTOLIC BLOOD PRESSURE: 78 MMHG | HEIGHT: 64 IN | BODY MASS INDEX: 29.71 KG/M2 | SYSTOLIC BLOOD PRESSURE: 128 MMHG | HEART RATE: 71 BPM | OXYGEN SATURATION: 97 %

## 2019-08-20 DIAGNOSIS — I35.1 NONRHEUMATIC AORTIC VALVE INSUFFICIENCY: Primary | ICD-10-CM

## 2019-08-20 DIAGNOSIS — I48.0 PAROXYSMAL ATRIAL FIBRILLATION (HCC): ICD-10-CM

## 2019-08-20 DIAGNOSIS — I10 ESSENTIAL HYPERTENSION: ICD-10-CM

## 2019-08-20 DIAGNOSIS — E78.00 PURE HYPERCHOLESTEROLEMIA: ICD-10-CM

## 2019-08-20 DIAGNOSIS — I49.5 SSS (SICK SINUS SYNDROME) (HCC): ICD-10-CM

## 2019-08-20 PROCEDURE — 99214 OFFICE O/P EST MOD 30 MIN: CPT | Performed by: INTERNAL MEDICINE

## 2019-08-20 NOTE — PROGRESS NOTES
[facility]  OFFICE NOTE    Cardiovascular Medicine  Marquita Weinberg M.D., RPVI         No referring provider defined for this encounter.    Thank you for asking me to see Abril Avilez for f/up.    History of Present Illness  This is a 74 y.o. female with:  1. SSS (sick sinus syndrome) (CMS/formerly [facility])    2. Essential hypertension    3. Pure hypercholesterolemia    4. Paroxysmal atrial fibrillation (CMS/formerly [facility])          Chief complaint -Follow-up atrial fibrillation        History of present Illness- 73-year-old lady with history of ascending aortic aneurysm repair in 2010, doing reasonably well .  Her blood pressure is well controlled and she had a pacemaker checked  and they were functioning okay.  She takes cholesterol medicine and blood pressure medicines with no problems.  She stays active.    Patient denied any palpitations.  No shortness of breath on exertion.  No chest pain.  No lower extremity swelling.  No other acute problems.    Review of Systems - ROS  Constitution: Negative for weakness, weight gain and weight loss.   HENT: Negative for congestion.    Eyes: Negative for blurred vision.   Cardiovascular: As mentioned above  Respiratory: Negative for cough and hemoptysis.    Endocrine: Negative for polydipsia and polyuria.   Hematologic/Lymphatic: Negative for bleeding problem. Does not bruise/bleed easily.   Skin: Negative for flushing.   Musculoskeletal: Negative for neck pain and stiffness.   Gastrointestinal: Negative for abdominal pain, diarrhea, jaundice, melena, nausea and vomiting.   Genitourinary: Negative for dysuria and hematuria.   Neurological: Negative for dizziness, focal weakness and numbness.   Psychiatric/Behavioral: Negative for altered mental status and depression.          All other systems were reviewed and were negative.    family history includes Heart disease in her father and mother.     reports that she has never smoked. She has never used smokeless  "tobacco. She reports that she does not drink alcohol or use drugs.    Allergies   Allergen Reactions   • Hydroxyzine      Caused migraine headache   • Milk-Related Compounds    • Pravastatin Other (See Comments)     UNKNOWN   • Sulfa Antibiotics Hives   • Ampicillin Rash   • Penicillins Rash         Current Outpatient Medications:   •  amLODIPine (NORVASC) 2.5 MG tablet, TAKE 1 TABLET BY MOUTH ONCE DAILY, Disp: 30 tablet, Rfl: 6  •  aspirin 81 MG EC tablet, Take 81 mg by mouth Daily., Disp: , Rfl:   •  atorvastatin (LIPITOR) 20 MG tablet, Take 1 tablet by mouth Daily., Disp: , Rfl:   •  sotalol (BETAPACE) 80 MG tablet, Take 1 tablet by mouth 2 (Two) Times a Day., Disp: , Rfl:   •  spironolactone-hydrochlorothiazide (ALDACTAZIDE) 25-25 MG tablet, Take 0.5 tablets by mouth Daily., Disp: , Rfl:     Physical Exam:  Vitals:    08/20/19 1455   BP: 128/78   BP Location: Left arm   Patient Position: Sitting   Cuff Size: Adult   Pulse: 71   SpO2: 97%   Weight: 78.9 kg (174 lb)   Height: 162.6 cm (64\")   PainSc: 0-No pain     Current Pain Level: none  Pulse Ox: Normal  on room air  General: alert, appears stated age and cooperative     Body Habitus: well-nourished    HEENT: Head: Normocephalic, no lesions, without obvious abnormality. No arcus senilis, xanthelasma or xanthomas.    Neuro: alert, oriented x3  Pulses: 2+ and symmetric  JVP: Volume/Pulsation: Normal.  Normal waveforms.   Appropriate inspiratory decrease.  No Kussmaul's. No Gerardo's.   Carotid Exam: no bruit normal pulsation bilaterally   Carotid Volume: normal.     Respirations: no increased work of breathing   Chest:  Normal    Pulmonary:Normal   Precordium: Normal impulses. P2 is not palpable.  RV Heave: absent  LV Heave: absent  [place]:  normal size and placement  Palpable S4: absent.  Heart rate: normal    Heart Rhythm: regular     Heart Sounds: S1: normal  S2: normal  S3: absent   S4: absent  Opening Snap: absent   Diastolic murmur audible in aortic " area.   Pericardial Rub:  Absent: .    Abdomen:   Appearance: normal .  Palpation: Soft, non-tender to palpation, bowel sounds positive in all four quadrants; no guarding or rebound tenderness  Extremity: no edema.   LE Skin: no rashes  LE Hair:  normal  LE Pulses: well perfused with normal pulses in the distal extremities  Pallor on elevation: Absent. Rubor on dependency: None      DATA REVIEWED:     EKG. I personally reviewed and interpreted the EKG.  Not reviewed.    ECG/EMG Results (all)     None        ---------------------------------------------------  TTE/GUSTAVO:  Results for orders placed in visit on 08/17/17   Adult Transthoracic Echo Complete    Narrative · Mild mitral valve regurgitation is present  · Left atrial cavity size is moderately dilated.  · Left ventricular wall thickness is consistent with mild concentric   hypertrophy.  · Mild to moderate aortic valve regurgitation is present.  · Estimated EF appears to be in the range of 56 - 60%  · Mild tricuspid valve regurgitation is present.  · Left ventricular diastolic dysfunction (grade II) consistent with   pseudonormalization.            --------------------------------------------------------------------------------------------------  LABS:     The CVD Risk score (D'Agostino, et al., 2008) failed to calculate for the following reasons:    The 2008 CVD risk score is only valid for ages 30 to 74    The patient has a prior MI, stroke, CHF, or peripheral vascular disease diagnosis         Lab Results   Component Value Date    GLUCOSE 103 (H) 04/26/2018    BUN 23 (H) 07/24/2019    CREATININE 0.9 07/24/2019    EGFRIFNONA 65 04/26/2018    BCR 24.4 04/26/2018    K 4.4 07/24/2019    CO2 31.0 04/26/2018    CALCIUM 9.2 07/24/2019    ALBUMIN 4.5 07/24/2019    AST 22 07/24/2019    ALT 14 07/24/2019     Lab Results   Component Value Date    WBC 5.9 07/24/2019    HGB 13.2 07/24/2019    HCT 39.8 07/24/2019    MCV 96 (H) 07/24/2019     07/24/2019     Lab  Results   Component Value Date    CHOL 162 07/24/2019    CHLPL 143 06/17/2016    TRIG 50 07/24/2019    HDL 57 07/24/2019    LDL 92 07/24/2019     Lab Results   Component Value Date    TSH 1.890 04/26/2018     No results found for: CKTOTAL, CKMB, CKMBINDEX, TROPONINI, TROPONINT  Lab Results   Component Value Date    HGBA1C 5.8 (H) 06/17/2016     No results found for: DDIMER  Lab Results   Component Value Date    ALT 14 07/24/2019     Lab Results   Component Value Date    HGBA1C 5.8 (H) 06/17/2016     Lab Results   Component Value Date    CREATININE 0.9 07/24/2019     No results found for: IRON, TIBC, FERRITIN  No results found for: INR, PROTIME    Assessment/Plan     1. Status post ascending aortic aneurysm repair with no CAD in 2010,  doing well no chest pain or shortness of breath.  Diastolic murmur audible in aortic area.  We will plan performing an echocardiogram to look at the aortic root, aortic valve and ascending aorta.     2. Hypertension well controlled with amlodipine, Aldactazide and sotalol     3. Hyperlipidemia continue atorvastatin 20 mg daily.    Check lipid panel prior to next visit.     4. sick sinus syndrome status post dual-chamber pacemaker, on sotalol for paroxysmal atrial fibrillation and remains in sinus rhythm.    5.  Paroxysmal A. fib  Previously diagnosed, however recent pacemaker check showed that she was in normal sinus rhythm.  Has been on sotalol for rhythm control.  She has not been on anticoagulation, since she has a pacemaker and there is reliable source of there is no recurrence we will continue to hold off anti-coagulation.          Prevention:  Patient's Body mass index is 29.87 kg/m². BMI is above normal parameters. Recommendations include: exercise counseling and nutrition counseling.      Abril Avilez is not a smoker    AAA Screening:   Not needed    Return in about 6 months (around 2/20/2020).          This document has been electronically signed by Marquita Weinberg MD on  August 20, 2019 5:05 PM

## 2019-09-04 ENCOUNTER — HOSPITAL ENCOUNTER (OUTPATIENT)
Dept: CT IMAGING | Facility: HOSPITAL | Age: 74
Discharge: HOME | End: 2019-09-04
Admitting: INTERNAL MEDICINE

## 2019-09-04 ENCOUNTER — DOCUMENTATION (OUTPATIENT)
Dept: CARDIOLOGY | Facility: CLINIC | Age: 74
End: 2019-09-04

## 2019-09-04 DIAGNOSIS — I71.20 THORACIC AORTIC ANEURYSM WITHOUT RUPTURE (HCC): Primary | ICD-10-CM

## 2019-09-04 PROCEDURE — 0 IOPAMIDOL PER 1 ML: Performed by: INTERNAL MEDICINE

## 2019-09-04 PROCEDURE — 71275 CT ANGIOGRAPHY CHEST: CPT

## 2019-09-04 RX ADMIN — IOPAMIDOL 90 ML: 755 INJECTION, SOLUTION INTRAVENOUS at 09:35

## 2019-09-04 NOTE — PROGRESS NOTES
Patient presented for scheduled out patient echo today. Possible dissection noted in aorta. Patient sent for CTA of chest. Dr Weinberg was notified by radiologist of dissection. Dr Cotter was consulted to review films. Patient is being referred to Dr Mack at [facility] as outpatient. Dr Jacob notified by Dr Cotter.  Referral coordinator aware and is currently working on this.   Patient is in stable condition without chest pain. Manual BP obtained in right arm of 196/98. Manual BP of 165/78 obtained in left arm. Dr Weinberg made aware. Patient had not had home BP meds yet. Patient told to take BP meds as soon as she arrives home, she is to report to ER if any sudden chest, back, or shoulder pain of any sudden onset of SOB. WIll follow. Patient was updated by me as well as Dr Weinberg throughout process.

## 2019-09-04 NOTE — PROGRESS NOTES
Patient presented for scheduled out patient echo today to assess her known aortic regurgitation and previous history of aortic repairt.  Possible dissection noted in aorta. Patient sent for CTA of chest. Was noted to have possible dissection and intramural thrombus. I consulted Dr. Cotter in the setting of her previous aortic repair. Dr. Cotter reviewed the films and discussed with Dr. Mack at [facility].   Patient was asymptomatic, per Dr. Cotter's recommendations patient was stable for discharged and to f/up with Dr. Mack in the next couple of days for further assessment.   she is to report to ER if any sudden chest, back, or shoulder pain of any sudden onset of SOB. WIll follow.

## 2019-12-24 ENCOUNTER — TELEPHONE (OUTPATIENT)
Dept: CARDIOLOGY | Facility: CLINIC | Age: 74
End: 2019-12-24

## 2019-12-24 NOTE — TELEPHONE ENCOUNTER
Called patient about her blood pressure issues. She stated that it has been running 148-153 systolic and in the rang of 70-80 diastolic. Said the doctor she is seeing at [facility] is wanting to get her blood lower for there surgery she is having done.  She wanted to know what Dr. Weinberg would want to do for her blood pressure and to see if he could see her sooner. Told her I would let him know, and give her a call back once I heard from him. Patient voiced understanding.      ----- Message from Sonia Fernández sent at 12/23/2019  3:04 PM CST -----  This patient has left a v/mail today stating her B/P is elevated and she would like to come in before her 2/24/20 scheduled appointment, to see Dr Weinberg.  I thought you might like to speak with her first, her # is [phone].  Let me know how to proceed.  Thank you

## 2019-12-26 ENCOUNTER — TELEPHONE (OUTPATIENT)
Dept: CARDIOLOGY | Facility: CLINIC | Age: 74
End: 2019-12-26

## 2019-12-26 RX ORDER — AMLODIPINE BESYLATE 5 MG/1
5 TABLET ORAL DAILY
Qty: 30 TABLET | Refills: 11 | Status: SHIPPED | OUTPATIENT
Start: 2019-12-26 | End: 2020-10-08

## 2019-12-26 NOTE — TELEPHONE ENCOUNTER
Called patient to let her know about the medication changes, and that her appointment for her blood pressure check is 1/8/2020 at 10:00. Patient voiced understanding,       ----- Message from Marquita Weinberg MD sent at 12/24/2019  3:33 PM CST -----  Lets increase her Amlodipine to 5mg. Check back for BP in 2 weeks after change, if still running high then will increase to 10mg.     ----- Message -----  From: Lucrecia Vasquez MA  Sent: 12/24/2019   8:56 AM CST  To: Marquita Weinberg MD    She called about her blood pressure. She stated that it has been running 148-153 systolic and in the rang of 70-80 diastolic. Said the doctor she is seeing at [facility] is wanting to get her blood lower for there surgery she is having done. She said you were aware of her aortic aneurysmr. She wanted to know if she needed to be seen before her appointment 02/24/2020,? She don't have another appointment with her Doctor at [place] till March.       Thank you.

## 2020-01-08 ENCOUNTER — DOCUMENTATION (OUTPATIENT)
Dept: CARDIOLOGY | Facility: CLINIC | Age: 75
End: 2020-01-08

## 2020-01-08 NOTE — PROGRESS NOTES
Patient came in for blood pressure check today. Her pressure were   Right arm: 200/88   Left arm: 190/82  Heart rate was 74    Patient did not bring her home log but she says they are normally high, in the 170.     States that the medication makes her flush, and face starts to have a burning feeling when she takes it.

## 2020-01-09 ENCOUNTER — TELEPHONE (OUTPATIENT)
Dept: CARDIOLOGY | Facility: CLINIC | Age: 75
End: 2020-01-09

## 2020-01-09 DIAGNOSIS — I48.0 PAROXYSMAL ATRIAL FIBRILLATION (HCC): Primary | ICD-10-CM

## 2020-01-09 RX ORDER — LISINOPRIL 10 MG/1
10 TABLET ORAL DAILY
Qty: 90 TABLET | Refills: 3 | Status: SHIPPED | OUTPATIENT
Start: 2020-01-09 | End: 2020-01-17 | Stop reason: SDUPTHER

## 2020-01-09 NOTE — TELEPHONE ENCOUNTER
Let her know about the lab work and the new medication.     Patient voiced understanding         ----- Message from Marquita Weinberg MD sent at 1/9/2020 12:34 PM CST -----  Lets add Lisinopril 10mg and have her check BMP in a week.     ----- Message -----  From: Lucrecia Vasquez MA  Sent: 1/8/2020  11:05 AM CST  To: Marquita Weinberg MD    Patient came in for blood pressure check today. Her pressure were   Right arm: 200/88   Left arm: 190/82  Heart rate was 74    Patient did not bring her home log or blood pressure cuff but she says they are normally high, in the 170.     States that the medication makes her flush, and face starts to have a burning feeling when she takes it.

## 2020-01-16 ENCOUNTER — LAB (OUTPATIENT)
Dept: LAB | Facility: HOSPITAL | Age: 75
End: 2020-01-16

## 2020-01-16 DIAGNOSIS — I48.0 PAROXYSMAL ATRIAL FIBRILLATION (HCC): Primary | ICD-10-CM

## 2020-01-16 DIAGNOSIS — I48.0 PAROXYSMAL ATRIAL FIBRILLATION (HCC): ICD-10-CM

## 2020-01-16 LAB
ANION GAP SERPL CALCULATED.3IONS-SCNC: 12.3 MMOL/L (ref 5–15)
BUN BLD-MCNC: 21 MG/DL (ref 8–23)
BUN/CREAT SERPL: 23.6 (ref 7–25)
CALCIUM SPEC-SCNC: 9.5 MG/DL (ref 8.6–10.5)
CHLORIDE SERPL-SCNC: 100 MMOL/L (ref 98–107)
CO2 SERPL-SCNC: 26.7 MMOL/L (ref 22–29)
CREAT BLD-MCNC: 0.89 MG/DL (ref 0.57–1)
GFR SERPL CREATININE-BSD FRML MDRD: 62 ML/MIN/1.73
GLUCOSE BLD-MCNC: 88 MG/DL (ref 65–99)
POTASSIUM BLD-SCNC: 4.6 MMOL/L (ref 3.5–5.2)
SODIUM BLD-SCNC: 139 MMOL/L (ref 136–145)

## 2020-01-16 PROCEDURE — 80048 BASIC METABOLIC PNL TOTAL CA: CPT

## 2020-01-16 PROCEDURE — 36415 COLL VENOUS BLD VENIPUNCTURE: CPT

## 2020-01-17 ENCOUNTER — OFFICE VISIT (OUTPATIENT)
Dept: CARDIOLOGY | Facility: CLINIC | Age: 75
End: 2020-01-17

## 2020-01-17 VITALS
HEART RATE: 80 BPM | DIASTOLIC BLOOD PRESSURE: 72 MMHG | OXYGEN SATURATION: 98 % | BODY MASS INDEX: 31.76 KG/M2 | WEIGHT: 186 LBS | HEIGHT: 64 IN | SYSTOLIC BLOOD PRESSURE: 180 MMHG

## 2020-01-17 DIAGNOSIS — I48.0 PAROXYSMAL ATRIAL FIBRILLATION (HCC): ICD-10-CM

## 2020-01-17 DIAGNOSIS — R00.1 BRADYCARDIA: ICD-10-CM

## 2020-01-17 DIAGNOSIS — I10 ESSENTIAL HYPERTENSION: Primary | ICD-10-CM

## 2020-01-17 DIAGNOSIS — I49.5 SSS (SICK SINUS SYNDROME) (HCC): ICD-10-CM

## 2020-01-17 PROCEDURE — 99214 OFFICE O/P EST MOD 30 MIN: CPT | Performed by: INTERNAL MEDICINE

## 2020-01-17 PROCEDURE — 93000 ELECTROCARDIOGRAM COMPLETE: CPT | Performed by: INTERNAL MEDICINE

## 2020-01-17 RX ORDER — SPIRONOLACTONE AND HYDROCHLOROTHIAZIDE 25; 25 MG/1; MG/1
1 TABLET ORAL DAILY
Qty: 90 TABLET | Refills: 3 | Status: SHIPPED | OUTPATIENT
Start: 2020-01-17 | End: 2021-01-25

## 2020-01-17 RX ORDER — LISINOPRIL 10 MG/1
20 TABLET ORAL DAILY
Qty: 90 TABLET | Refills: 3 | Status: SHIPPED | OUTPATIENT
Start: 2020-01-17 | End: 2020-08-12

## 2020-01-17 NOTE — PROGRESS NOTES
[facility]  OFFICE NOTE    Cardiovascular Medicine  Marquita Weinberg M.D., RPVI         No referring provider defined for this encounter.    Thank you for asking me to see Abril Avilez for f/up.    History of Present Illness  This is a 75 y.o. female with:  1. SSS (sick sinus syndrome) (CMS/formerly [facility])    2. Essential hypertension    3. Pure hypercholesterolemia    4. Paroxysmal atrial fibrillation (CMS/formerly [facility])          Chief complaint -Follow-up atrial fibrillation        History of present Illness- 73-year-old lady with history of ascending aortic aneurysm repair in 2010, doing reasonably well .  Her blood pressure is well controlled and she had a pacemaker checked  and they were functioning okay.      After last visit patient presented for scheduled out patient echo today to assess her known aortic regurgitation and previous history of aortic repairt.  Possible dissection noted in aorta on echo. Patient sent for CTA of chest. Was noted to have possible dissection and intramural thrombus. I consulted Dr. Cotter in the setting of her previous aortic repair. Dr. Cotter reviewed the films and discussed with Dr. Mack at [facility]. Patient was asymptomatic, per Dr. Cotter's recommendations patient was stable for discharged and to f/up with Dr. Mack, patient has been seen and has been plan for potential second repair and valve replacement.  Patient is currently denying any chest pain or shortness of breath.  she is to report to ER if any sudden chest, back, or shoulder pain of any sudden onset of SOB.     Pressures been running on the higher side, increase amlodipine and start her on lisinopril her blood pressure still elevated in office today.    Review of Systems - ROS  Constitution: Negative for weakness, weight gain and weight loss.   HENT: Negative for congestion.    Eyes: Negative for blurred vision.   Cardiovascular: As mentioned above  Respiratory: Negative for cough and hemoptysis.     "  Endocrine: Negative for polydipsia and polyuria.   Hematologic/Lymphatic: Negative for bleeding problem. Does not bruise/bleed easily.   Skin: Negative for flushing.   Musculoskeletal: Negative for neck pain and stiffness.   Gastrointestinal: Negative for abdominal pain, diarrhea, jaundice, melena, nausea and vomiting.   Genitourinary: Negative for dysuria and hematuria.   Neurological: Negative for dizziness, focal weakness and numbness.   Psychiatric/Behavioral: Negative for altered mental status and depression.          All other systems were reviewed and were negative.    family history includes Heart disease in her father and mother.     reports that she has never smoked. She has never used smokeless tobacco. She reports that she does not drink alcohol or use drugs.    Allergies   Allergen Reactions   • Hydroxyzine      Caused migraine headache   • Milk-Related Compounds    • Pravastatin Other (See Comments)     UNKNOWN   • Sulfa Antibiotics Hives   • Ampicillin Rash   • Penicillins Rash         Current Outpatient Medications:   •  amLODIPine (NORVASC) 5 MG tablet, Take 1 tablet by mouth Daily., Disp: 30 tablet, Rfl: 11  •  aspirin 81 MG EC tablet, Take 81 mg by mouth Daily., Disp: , Rfl:   •  atorvastatin (LIPITOR) 20 MG tablet, Take 1 tablet by mouth Daily., Disp: , Rfl:   •  lisinopril (PRINIVIL,ZESTRIL) 10 MG tablet, Take 2 tablets by mouth Daily., Disp: 90 tablet, Rfl: 3  •  sotalol (BETAPACE) 80 MG tablet, Take 1 tablet by mouth 2 (Two) Times a Day., Disp: , Rfl:   •  spironolactone-hydrochlorothiazide (ALDACTAZIDE) 25-25 MG tablet, Take 1 tablet by mouth Daily., Disp: 90 tablet, Rfl: 3    Physical Exam:  Vitals:    01/17/20 1104   BP: 180/72   BP Location: Left arm   Patient Position: Sitting   Cuff Size: Adult   Pulse: 80   SpO2: 98%   Weight: 84.4 kg (186 lb)   Height: 162.6 cm (64.02\")   PainSc: 0-No pain     Current Pain Level: none  Pulse Ox: Normal  on room air  General: alert, appears stated age " and cooperative     Body Habitus: well-nourished    HEENT: Head: Normocephalic, no lesions, without obvious abnormality. No arcus senilis, xanthelasma or xanthomas.    Neuro: alert, oriented x3  Pulses: 2+ and symmetric  JVP: Volume/Pulsation: Normal.  Normal waveforms.   Appropriate inspiratory decrease.  No Kussmaul's. No Gerardo's.   Carotid Exam: no bruit normal pulsation bilaterally   Carotid Volume: normal.     Respirations: no increased work of breathing   Chest:  Normal    Pulmonary:Normal   Precordium: Normal impulses. P2 is not palpable.  RV Heave: absent  LV Heave: absent  [place]:  normal size and placement  Palpable S4: absent.  Heart rate: normal    Heart Rhythm: regular     Heart Sounds: S1: normal  S2: normal  S3: absent   S4: absent  Opening Snap: absent   Diastolic murmur audible in aortic area.   Pericardial Rub:  Absent: .    Abdomen:   Appearance: normal .  Palpation: Soft, non-tender to palpation, bowel sounds positive in all four quadrants; no guarding or rebound tenderness  Extremity: no edema.   LE Skin: no rashes  LE Hair:  normal  LE Pulses: well perfused with normal pulses in the distal extremities  Pallor on elevation: Absent. Rubor on dependency: None      DATA REVIEWED:     EKG. I personally reviewed and interpreted the EKG.  Not reviewed.    ECG/EMG Results (all)     None        ---------------------------------------------------  TTE/GUSTAVO:  Results for orders placed during the hospital encounter of 09/04/19   Adult Transthoracic Echo Complete W/ Cont if Necessary Per Protocol    Narrative · Linear echogenic density noted in the ascending aorta in the setting of   previous ascending aortic aneurysm repair could be related to   post-operative changes, however cannot rule out dissection. Color doppler   did not reveal any flow into that space beyond the echogenic density.   Findings are unchanged from previous echocardiogram  · Aneurysmal dilation of the ascending aorta is present  ·  There is an unknown type of ascending aorta graft noted  · Estimated EF appears to be in the range of 51 - 55%  · Left ventricular systolic function is normal.  · Moderate Aortic regurgitation unchanged from before.  · Moderate pulmonic valve regurgitation is present.  · Mild tricuspid valve regurgitation is present.  · Mild-to-moderate mitral valve regurgitation is present  · Left atrial cavity size is mild-to-moderately dilated.     Findings discussed with the patient. Will perform STAT CTA and referred to   CT surgery.            --------------------------------------------------------------------------------------------------  LABS:     The CVD Risk score (Pricila et al., 2008) failed to calculate for the following reasons:    The 2008 CVD risk score is only valid for ages 30 to 74    The patient has a prior MI, stroke, CHF, or peripheral vascular disease diagnosis         Lab Results   Component Value Date    GLUCOSE 88 01/16/2020    BUN 21 01/16/2020    CREATININE 0.89 01/16/2020    EGFRIFNONA 62 01/16/2020    BCR 23.6 01/16/2020    K 4.6 01/16/2020    CO2 26.7 01/16/2020    CALCIUM 9.5 01/16/2020    ALBUMIN 4.5 07/24/2019    AST 22 07/24/2019    ALT 14 07/24/2019     Lab Results   Component Value Date    WBC 5.9 07/24/2019    HGB 13.2 07/24/2019    HCT 39.8 07/24/2019    MCV 96 (H) 07/24/2019     07/24/2019     Lab Results   Component Value Date    CHOL 162 07/24/2019    CHLPL 143 06/17/2016    TRIG 50 07/24/2019    HDL 57 07/24/2019    LDL 92 07/24/2019     Lab Results   Component Value Date    TSH 1.890 04/26/2018     No results found for: CKTOTAL, CKMB, CKMBINDEX, TROPONINI, TROPONINT  Lab Results   Component Value Date    HGBA1C 5.8 (H) 06/17/2016     No results found for: DDIMER  Lab Results   Component Value Date    ALT 14 07/24/2019     Lab Results   Component Value Date    HGBA1C 5.8 (H) 06/17/2016     Lab Results   Component Value Date    CREATININE 0.89 01/16/2020     No results found  for: IRON, TIBC, FERRITIN  No results found for: INR, PROTIME    Assessment/Plan     1. Status post ascending aortic aneurysm repair with no CAD in 2010,  doing well no chest pain or shortness of breath.  Diastolic murmur audible in aortic area.  Concerning findings on echo and CT as above.  Moderate aortic regurgitation.  She is following with thoracic surgery at [place].  Will control blood pressure better     2. Hypertension not well controlled with amlodipine, Aldactazide and sotalol  Will continue amlodipine 5 mg.  Increase lisinopril to 20 mg, increase Aldactazide to full tablet and will check BMP in a week.  She will come back for blood pressure check in a week.     3. Hyperlipidemia continue atorvastatin 20 mg daily.    Check lipid panel prior to next visit.     4. sick sinus syndrome status post dual-chamber pacemaker, on sotalol for paroxysmal atrial fibrillation and remains in sinus rhythm.    5.  Paroxysmal A. fib  Previously diagnosed, however recent pacemaker check showed that she was in normal sinus rhythm.  Has been on sotalol for rhythm control.  She has not been on anticoagulation, since she has a pacemaker and there is reliable source of there is no recurrence we will continue to hold off anti-coagulation.          Prevention:  Patient's Body mass index is 31.91 kg/m². BMI is above normal parameters. Recommendations include: exercise counseling and nutrition counseling.      Abril Carranza Alevism is not a smoker    AAA Screening:   Not needed    Return in about 3 months (around 4/17/2020).          This document has been electronically signed by Marquita Weinberg MD on January 17, 2020 11:32 AM

## 2020-01-21 ENCOUNTER — CLINICAL SUPPORT (OUTPATIENT)
Dept: CARDIOLOGY | Facility: CLINIC | Age: 75
End: 2020-01-21

## 2020-01-21 DIAGNOSIS — Z95.0 PRESENCE OF CARDIAC PACEMAKER: ICD-10-CM

## 2020-01-21 DIAGNOSIS — I49.5 SSS (SICK SINUS SYNDROME) (HCC): Primary | ICD-10-CM

## 2020-01-21 PROCEDURE — 93296 REM INTERROG EVL PM/IDS: CPT | Performed by: NURSE PRACTITIONER

## 2020-01-21 PROCEDURE — 93294 REM INTERROG EVL PM/LDLS PM: CPT | Performed by: NURSE PRACTITIONER

## 2020-01-23 NOTE — PROGRESS NOTES
Pacemaker Evaluation Report    January 23, 2020    Primary Cardiologist: Dr. Gallegos  Implanting MD: Dr. Arroyo  :A[employer]ott Model: 2210 Serial Number: [id number]  Implant date: 9/6/2011     Reason for evaluation:routine, PPM, Remote  Cardiac device indication(s):sinus node dysfunction/SSS    Battery  CARLOS: 1.5 yrs     Interrogation Results  Atrial sensing: P wave: 2.1 mV  Atrial capture: 0.75 V @ 0.5 ms   Atrial lead impedance: 330 ohms  Ventricular sensing: R wave: 2.7 mV  Ventricular capture: 1.375 V @ 0.5 ms  Ventricular lead impedance: right  330 ohms    Parameters  Mode: DDDR  Base Rate: 60/120    Diagnostic Data  Atrial paced: 96 %  Ventricular paced: 3.8%  Mode switch:< 1%  AT/AF [place]: <1%  AHR: 9458 counts ( appears to be noise)  VHR: 58 appears to be noise  Noise reversion [id number]    Changes made: No changes    Conclusions: normal device function and Follow up in 6 months   Lead noise noted on previous encounters    Assessment:  1. SSS (sick sinus syndrome) (CMS/HCC)    2. Presence of cardiac pacemaker    - NO AC indicated. Remains NSR on sotalol. Will monitor. Cannot find documentation of contraindication.           This document has been electronically signed by LEONOR Kinsey on January 23, 2020 3:15 PM

## 2020-01-24 ENCOUNTER — DOCUMENTATION (OUTPATIENT)
Dept: CARDIOLOGY | Facility: CLINIC | Age: 75
End: 2020-01-24

## 2020-01-24 NOTE — PROGRESS NOTES
Patient came in for her blood pressure check.     160/82 in the right arm.     Log was just a few days.   01/18/2020 at 7pm 112/60  01/19/2020 at 6pm 123/72  01/20/2020 at 4am 114/67  01/21/2020 at 4am 122/64      Told her we will give he a call if anything changes, patient voiced understanding

## 2020-01-27 ENCOUNTER — TELEPHONE (OUTPATIENT)
Dept: CARDIOLOGY | Facility: CLINIC | Age: 75
End: 2020-01-27

## 2020-01-27 NOTE — TELEPHONE ENCOUNTER
Called patient to see if she can come in Friday for a blood pressure check Friday the 31st, she will.         ----- Message from Marquita Weinberg MD sent at 1/24/2020 11:37 AM CST -----  If not then have her take same medicines, have her come back next week for bp check and bring her cuff    ----- Message -----  From: Lucrecia Vasquez MA  Sent: 1/24/2020  10:25 AM CST  To: Marquita Weinberg MD    Patient came in for her blood pressure check     160/82    She was checking it at home.     1/18/2020 at 7pm 112/60  1/19/2020 at 6pm 123/72  1/20/2020 at 4am 114/67  1/21/2020 at 4am 112/64

## 2020-01-31 ENCOUNTER — DOCUMENTATION (OUTPATIENT)
Dept: CARDIOLOGY | Facility: CLINIC | Age: 75
End: 2020-01-31

## 2020-07-22 ENCOUNTER — CLINICAL SUPPORT (OUTPATIENT)
Dept: CARDIOLOGY | Facility: CLINIC | Age: 75
End: 2020-07-22

## 2020-07-22 DIAGNOSIS — Z95.0 PRESENCE OF CARDIAC PACEMAKER: ICD-10-CM

## 2020-07-22 DIAGNOSIS — I49.5 SSS (SICK SINUS SYNDROME) (HCC): Primary | ICD-10-CM

## 2020-07-22 PROCEDURE — 93288 INTERROG EVL PM/LDLS PM IP: CPT | Performed by: NURSE PRACTITIONER

## 2020-07-22 NOTE — PROGRESS NOTES
Pacemaker Evaluation Report    July 22, 2020    Primary Cardiologist: Dr. Weinberg  Implanting MD: Dr. Arroyo  :A[employer]ott Model: 2210 Serial Number: [id number]  Implant date: 9/6/2011     Reason for evaluation:routine, PPM, Office  Cardiac device indication(s):sinus node dysfunction/SSS    Battery  CARLOS: 9.7 months     Interrogation Results  Atrial sensing: P wave: 3.0 mV  Atrial capture: 0.75 V @ 0.5 ms   Atrial lead impedance: 330 ohms  Ventricular sensing: R wave: 3.1 mV  Ventricular capture: 1.12 V @ 0.5 ms  Ventricular lead impedance: right  350 ohms    Parameters  Mode: DDDR  Base Rate: 60/120    Diagnostic Data  Atrial paced: 96 %  Ventricular paced: 6.2%  Mode switch:< 1%  AT/AF [place]: <1%  AHR: 101  counts (appears to be noise)  VHR: 1 appears to be noise  Noise reversion 128 Hx of    Intrinsic Rate: 50    Changes made: No changes    Conclusions: normal device function and Follow up in 6 months   Lead noise noted on previous encounters    Assessment:  1. SSS (sick sinus syndrome) (CMS/HCC)    2. Presence of cardiac pacemaker       - NO AC indicated. Remains NSR on sotalol. Will monitor. Cannot find documentation of contraindication.                   This document has been electronically signed by LEONOR Pierre on July 22, 2020 16:44

## 2020-07-28 NOTE — PROGRESS NOTES
Pacemaker Evaluation Report    December 20, 2017    Primary Cardiologist: Dr. Gallegos  Implanting MD: Dr. Arroyo  :A[employer]ott Model: 2210 Serial Number: [id number]  Implant date: 9/6/2011    Reason for evaluation:routine  Office    PPM  Cardiac device indication(s):sinus node dysfunction/SSS    Battery  CARLOS: 4.7-5.4 yrs, 2.89V    Interrogation Results  Atrial sensing: P wave: 2.6 mV  Atrial capture: 0.62 V @ 0.5 ms   Atrial lead impedance: 340 ohms  Ventricular sensing: R wave: 6.3 mV  Ventricular capture: 1.0 V @ 0.5 ms  Ventricular lead impedance: right  410 ohms    Parameters  Mode: DDDR  Base Rate:     Diagnostic Data  Atrial paced: 96 % Ventricular paced: 3.1 %  Mode switch: 10,803  AT/AF [place]: <1%      Changes made: none    Conclusions: normal device function, HVR due to lead noise, reproducible with movement in [facility]. AMS due to lead noise.    Assessment:  SSS  Presence of a cardiac pacemaker    F/U 6 mo- office   ,DirectAddress_Unknown

## 2020-08-12 RX ORDER — LISINOPRIL 10 MG/1
TABLET ORAL
Qty: 180 TABLET | Refills: 0 | Status: SHIPPED | OUTPATIENT
Start: 2020-08-12 | End: 2020-12-01 | Stop reason: HOSPADM

## 2020-08-24 RX ORDER — SOTALOL HYDROCHLORIDE 80 MG/1
80 TABLET ORAL 2 TIMES DAILY
Qty: 60 TABLET | Refills: 3 | Status: SHIPPED | OUTPATIENT
Start: 2020-08-24 | End: 2021-01-04

## 2020-08-28 ENCOUNTER — OFFICE VISIT (OUTPATIENT)
Dept: CARDIOLOGY | Facility: CLINIC | Age: 75
End: 2020-08-28

## 2020-08-28 VITALS
DIASTOLIC BLOOD PRESSURE: 65 MMHG | SYSTOLIC BLOOD PRESSURE: 140 MMHG | HEIGHT: 64 IN | WEIGHT: 187 LBS | HEART RATE: 81 BPM | OXYGEN SATURATION: 98 % | BODY MASS INDEX: 31.92 KG/M2

## 2020-08-28 DIAGNOSIS — I48.0 PAROXYSMAL ATRIAL FIBRILLATION (HCC): Primary | ICD-10-CM

## 2020-08-28 PROCEDURE — 93000 ELECTROCARDIOGRAM COMPLETE: CPT | Performed by: INTERNAL MEDICINE

## 2020-08-28 PROCEDURE — 99214 OFFICE O/P EST MOD 30 MIN: CPT | Performed by: INTERNAL MEDICINE

## 2020-08-28 NOTE — PROGRESS NOTES
[facility] Cardiology  OFFICE NOTE    Cardiovascular Medicine  Marquita Weinberg M.D., RPVI         No referring provider defined for this encounter.    Thank you for asking me to see Abril Avilez for f/up.    History of Present Illness  This is a 75 y.o. female with:  1. SSS (sick sinus syndrome) (CMS/[facility])    2. Essential hypertension    3. Pure hypercholesterolemia    4. Paroxysmal atrial fibrillation (CMS/[facility])          Chief complaint -Follow-up atrial fibrillation        History of present Illness- 75-year-old lady with history of ascending aortic aneurysm repair in 2010, doing reasonably well .  Her blood pressure is well controlled and she had a pacemaker checked  and they were functioning okay.      After last visit patient presented for scheduled out patient echo today to assess her known aortic regurgitation and previous history of aortic repairt.  Possible dissection noted in aorta on echo. Patient sent for CTA of chest. Was noted to have possible dissection and intramural thrombus. I consulted Dr. Cotter in the setting of her previous aortic repair. Dr. Cotter reviewed the films and discussed with Dr. Mack at [facility]. Patient was asymptomatic, per Dr. Cotter's recommendations patient was stable for discharged and to f/up with Dr. Mack, patient has been seen and has been plan for potential second repair and valve replacement.  Patient is currently denying any chest pain or shortness of breath.  she is to report to ER if any sudden chest, back, or shoulder pain of any sudden onset of SOB.     08/28/2020:  No acute issues since last visit.  Denying any chest pain or shortness of breath.  Denying palpitations.    Blood pressure staying in the 120s at home mostly.    Review of Systems - ROS  Constitution: Negative for weakness, weight gain and weight loss.   HENT: Negative for congestion.    Eyes: Negative for blurred vision.   Cardiovascular: As mentioned above  Respiratory: Negative  "for cough and hemoptysis.    Endocrine: Negative for polydipsia and polyuria.   Hematologic/Lymphatic: Negative for bleeding problem. Does not bruise/bleed easily.   Skin: Negative for flushing.   Musculoskeletal: Negative for neck pain and stiffness.   Gastrointestinal: Negative for abdominal pain, diarrhea, jaundice, melena, nausea and vomiting.   Genitourinary: Negative for dysuria and hematuria.   Neurological: Negative for dizziness, focal weakness and numbness.   Psychiatric/Behavioral: Negative for altered mental status and depression.          All other systems were reviewed and were negative.    family history includes Heart disease in her father and mother.     reports that she has never smoked. She has never used smokeless tobacco. She reports that she does not drink alcohol or use drugs.    Allergies   Allergen Reactions   • Hydroxyzine Headache     Caused migraine headache   • Ampicillin Rash   • Milk-Related Compounds GI Intolerance   • Penicillins Rash   • Pravastatin Mental Status Change     hyperactivity   • Sulfa Antibiotics Hives         Current Outpatient Medications:   •  amLODIPine (NORVASC) 5 MG tablet, Take 1 tablet by mouth Daily., Disp: 30 tablet, Rfl: 11  •  aspirin 81 MG EC tablet, Take 81 mg by mouth Daily., Disp: , Rfl:   •  atorvastatin (LIPITOR) 20 MG tablet, Take 1 tablet by mouth Daily., Disp: , Rfl:   •  lisinopril (PRINIVIL,ZESTRIL) 10 MG tablet, Take 2 tablets by mouth once daily, Disp: 180 tablet, Rfl: 0  •  sotalol (BETAPACE) 80 MG tablet, Take 1 tablet by mouth 2 (Two) Times a Day., Disp: 60 tablet, Rfl: 3  •  spironolactone-hydrochlorothiazide (ALDACTAZIDE) 25-25 MG tablet, Take 1 tablet by mouth Daily., Disp: 90 tablet, Rfl: 3    Physical Exam:  Vitals:    08/28/20 1151   BP: 140/65   BP Location: Left arm   Patient Position: Sitting   Cuff Size: Adult   Pulse: 81   SpO2: 98%   Weight: 84.8 kg (187 lb)   Height: 162.6 cm (64\")     Current Pain Level: none  Pulse Ox: Normal  " on room air  General: alert, appears stated age and cooperative     Body Habitus: well-nourished    HEENT: Head: Normocephalic, no lesions, without obvious abnormality. No arcus senilis, xanthelasma or xanthomas.    Neuro: alert, oriented x3  Pulses: 2+ and symmetric  JVP: Volume/Pulsation: Normal.  Normal waveforms.   Appropriate inspiratory decrease.  No Kussmaul's. No Gerardo's.   Carotid Exam: no bruit normal pulsation bilaterally   Carotid Volume: normal.     Respirations: no increased work of breathing   Chest:  Normal    Pulmonary:Normal   Precordium: Normal impulses. P2 is not palpable.  RV Heave: absent  LV Heave: absent  [place]:  normal size and placement  Palpable S4: absent.  Heart rate: normal    Heart Rhythm: regular     Heart Sounds: S1: normal  S2: normal  S3: absent   S4: absent  Opening Snap: absent   Diastolic murmur audible in aortic area.   Pericardial Rub:  Absent: .    Abdomen:   Appearance: normal .  Palpation: Soft, non-tender to palpation, bowel sounds positive in all four quadrants; no guarding or rebound tenderness  Extremity: no edema.   LE Skin: no rashes  LE Hair:  normal  LE Pulses: well perfused with normal pulses in the distal extremities  Pallor on elevation: Absent. Rubor on dependency: None      DATA REVIEWED:     EKG. I personally reviewed and interpreted the EKG.  Not reviewed.    ECG/EMG Results (all)     None        ---------------------------------------------------  TTE/GUSTAVO:  Results for orders placed during the hospital encounter of 09/04/19   Adult Transthoracic Echo Complete W/ Cont if Necessary Per Protocol    Narrative · Linear echogenic density noted in the ascending aorta in the setting of   previous ascending aortic aneurysm repair could be related to   post-operative changes, however cannot rule out dissection. Color doppler   did not reveal any flow into that space beyond the echogenic density.   Findings are unchanged from previous echocardiogram  · Aneurysmal  dilation of the ascending aorta is present  · There is an unknown type of ascending aorta graft noted  · Estimated EF appears to be in the range of 51 - 55%  · Left ventricular systolic function is normal.  · Moderate Aortic regurgitation unchanged from before.  · Moderate pulmonic valve regurgitation is present.  · Mild tricuspid valve regurgitation is present.  · Mild-to-moderate mitral valve regurgitation is present  · Left atrial cavity size is mild-to-moderately dilated.     Findings discussed with the patient. Will perform STAT CTA and referred to   CT surgery.            --------------------------------------------------------------------------------------------------  LABS:     The CVD Risk score (Pricila et al., 2008) failed to calculate for the following reasons:    The 2008 CVD risk score is only valid for ages 30 to 74    The patient has a prior MI, stroke, CHF, or peripheral vascular disease diagnosis         Lab Results   Component Value Date    GLUCOSE 88 01/16/2020    BUN 21 01/16/2020    CREATININE 0.89 01/16/2020    EGFRIFNONA 62 01/16/2020    BCR 23.6 01/16/2020    K 4.6 01/16/2020    CO2 26.7 01/16/2020    CALCIUM 9.5 01/16/2020    ALBUMIN 4.5 07/24/2019    AST 22 07/24/2019    ALT 14 07/24/2019     Lab Results   Component Value Date    WBC 5.9 07/24/2019    HGB 13.2 07/24/2019    HCT 39.8 07/24/2019    MCV 96 (H) 07/24/2019     07/24/2019     Lab Results   Component Value Date    CHOL 162 07/24/2019    CHLPL 143 06/17/2016    TRIG 50 07/24/2019    HDL 57 07/24/2019    LDL 92 07/24/2019     Lab Results   Component Value Date    TSH 1.890 04/26/2018     No results found for: CKTOTAL, CKMB, CKMBINDEX, TROPONINI, TROPONINT  Lab Results   Component Value Date    HGBA1C 5.8 (H) 06/17/2016     No results found for: DDIMER  Lab Results   Component Value Date    ALT 14 07/24/2019     Lab Results   Component Value Date    HGBA1C 5.8 (H) 06/17/2016     Lab Results   Component Value Date     CREATININE 0.89 01/16/2020     No results found for: IRON, TIBC, FERRITIN  No results found for: INR, PROTIME    Assessment/Plan     1. Status post ascending aortic aneurysm repair with no CAD in 2010,  doing well no chest pain or shortness of breath.  Diastolic murmur audible in aortic area.  Concerning findings on echo and CT as above.  Moderate aortic regurgitation.  She is following with thoracic surgery at [place].  Will control blood pressure better     2. Hypertension not on  amlodipine, Aldactazide and sotalol  Will continue amlodipine 5 mg.  Increased lisinopril to 20 mg last visit  Aldactazide 25/25  Follow-up blood pressure check in 2 weeks.     3. Hyperlipidemia continue atorvastatin 20 mg daily.    Check lipid panel prior to next visit.     4. sick sinus syndrome status post dual-chamber pacemaker, on sotalol for paroxysmal atrial fibrillation and remains in sinus rhythm.    5.  Paroxysmal A. fib  Previously diagnosed, however recent pacemaker check showed that she was in normal sinus rhythm.  Has been on sotalol for rhythm control.  CHADSVASC is 4.   She has not been on anticoagulation, since she has a pacemaker and there is reliable source of there is no recurrence we will continue to hold off anti-coagulation. Also in the setting of her aortic dilation, its ok to hold off AC.           Prevention:  Patient's Body mass index is 32.1 kg/m². BMI is above normal parameters. Recommendations include: exercise counseling and nutrition counseling.      Abril Carranza Islam is not a smoker    AAA Screening:   Not needed            This document has been electronically signed by Marquita Weinberg MD on August 28, 2020 12:02

## 2020-09-17 ENCOUNTER — TRANSCRIBE ORDERS (OUTPATIENT)
Dept: CARDIOLOGY | Facility: CLINIC | Age: 75
End: 2020-09-17

## 2020-09-17 DIAGNOSIS — I71.20 THORACIC AORTIC ANEURYSM WITHOUT RUPTURE (HCC): Primary | ICD-10-CM

## 2020-10-07 ENCOUNTER — TELEPHONE (OUTPATIENT)
Dept: CARDIOLOGY | Facility: CLINIC | Age: 75
End: 2020-10-07

## 2020-10-07 NOTE — TELEPHONE ENCOUNTER
Received a call from Ciera with Dr Mack from [facility] (following her for her aneurism). She is going to be calling today to set up an appt. She was having some hypertension and new shortness of breath. They are going to fax over her most recent office visit note and they are wanting her to schedule a f/u appt with Dr Weinberg.

## 2020-10-08 ENCOUNTER — OFFICE VISIT (OUTPATIENT)
Dept: CARDIOLOGY | Facility: CLINIC | Age: 75
End: 2020-10-08

## 2020-10-08 VITALS
WEIGHT: 183 LBS | BODY MASS INDEX: 31.24 KG/M2 | DIASTOLIC BLOOD PRESSURE: 70 MMHG | OXYGEN SATURATION: 96 % | SYSTOLIC BLOOD PRESSURE: 175 MMHG | HEART RATE: 76 BPM | HEIGHT: 64 IN

## 2020-10-08 DIAGNOSIS — R93.89 ABNORMAL FINDINGS ON DIAGNOSTIC IMAGING OF OTHER SPECIFIED BODY STRUCTURES: ICD-10-CM

## 2020-10-08 DIAGNOSIS — R06.02 SHORTNESS OF BREATH: Primary | ICD-10-CM

## 2020-10-08 PROCEDURE — 99215 OFFICE O/P EST HI 40 MIN: CPT | Performed by: INTERNAL MEDICINE

## 2020-10-08 PROCEDURE — 93000 ELECTROCARDIOGRAM COMPLETE: CPT | Performed by: INTERNAL MEDICINE

## 2020-10-08 RX ORDER — AMLODIPINE BESYLATE 10 MG/1
10 TABLET ORAL DAILY
Qty: 90 TABLET | Refills: 3 | Status: SHIPPED | OUTPATIENT
Start: 2020-10-08 | End: 2021-09-16

## 2020-10-08 RX ORDER — FUROSEMIDE 40 MG/1
40 TABLET ORAL DAILY
Qty: 30 TABLET | Refills: 11 | Status: SHIPPED | OUTPATIENT
Start: 2020-10-08 | End: 2020-12-01 | Stop reason: HOSPADM

## 2020-10-08 NOTE — PROGRESS NOTES
[facility] Cardiology  OFFICE NOTE    Cardiovascular Medicine  Marquita Weinberg M.D., RPVI         No referring provider defined for this encounter.    Thank you for asking me to see Abril Avilez for f/up.    History of Present Illness  This is a 75 y.o. female with:  1. SSS (sick sinus syndrome) (CMS/[facility])    2. Essential hypertension    3. Pure hypercholesterolemia    4. Paroxysmal atrial fibrillation (CMS/[facility])          Chief complaint -Follow-up atrial fibrillation        History of present Illness- 75-year-old lady with history of ascending aortic aneurysm repair in 2010, doing reasonably well .  Her blood pressure is well controlled and she had a pacemaker checked  and they were functioning okay.      After last visit patient presented for scheduled out patient echo today to assess her known aortic regurgitation and previous history of aortic repairt.  Possible dissection noted in aorta on echo. Patient sent for CTA of chest. Was noted to have possible dissection and intramural thrombus. I consulted Dr. Cotter in the setting of her previous aortic repair. Dr. Cotter reviewed the films and discussed with Dr. Mack at [facility]. Patient was asymptomatic, per Dr. Cotter's recommendations patient was stable for discharged and to f/up with Dr. Mack, patient has been seen and has been plan for potential second repair and valve replacement.  Patient is currently denying any chest pain or shortness of breath.  she is to report to ER if any sudden chest, back, or shoulder pain of any sudden onset of SOB.     08/28/2020:  No acute issues since last visit.  Denying any chest pain or shortness of breath.  Denying palpitations.    10/08/2020:  Has been closing following with Dr. Gomez at [facility] for her ascending aortic aneurysm.  Was recently seen, noted to have elevated blood pressure and and worsening shortness of breath, here for further evaluation.  Patient did endorse that after exertion her blood  pressure has been elevated at home is within the 150s 160s, she has gained some weight.  Denying orthopnea or PND though.  No lower extremity swelling.            Review of Systems - ROS  Constitution: Negative for weakness, weight gain and weight loss.   HENT: Negative for congestion.    Eyes: Negative for blurred vision.   Cardiovascular: As mentioned above  Respiratory: Negative for cough and hemoptysis.    Endocrine: Negative for polydipsia and polyuria.   Hematologic/Lymphatic: Negative for bleeding problem. Does not bruise/bleed easily.   Skin: Negative for flushing.   Musculoskeletal: Negative for neck pain and stiffness.   Gastrointestinal: Negative for abdominal pain, diarrhea, jaundice, melena, nausea and vomiting.   Genitourinary: Negative for dysuria and hematuria.   Neurological: Negative for dizziness, focal weakness and numbness.   Psychiatric/Behavioral: Negative for altered mental status and depression.          All other systems were reviewed and were negative.    family history includes Heart disease in her father and mother.     reports that she has never smoked. She has never used smokeless tobacco. She reports that she does not drink alcohol or use drugs.    Allergies   Allergen Reactions   • Hydroxyzine Headache     Caused migraine headache   • Ampicillin Rash   • Milk-Related Compounds GI Intolerance   • Penicillins Rash   • Pravastatin Mental Status Change     hyperactivity   • Sulfa Antibiotics Hives         Current Outpatient Medications:   •  amLODIPine (NORVASC) 5 MG tablet, Take 1 tablet by mouth Daily., Disp: 30 tablet, Rfl: 11  •  aspirin 81 MG EC tablet, Take 81 mg by mouth Daily., Disp: , Rfl:   •  atorvastatin (LIPITOR) 20 MG tablet, Take 1 tablet by mouth Daily., Disp: , Rfl:   •  lisinopril (PRINIVIL,ZESTRIL) 10 MG tablet, Take 2 tablets by mouth once daily (Patient taking differently: 20 mg Daily.), Disp: 180 tablet, Rfl: 0  •  sotalol (BETAPACE) 80 MG tablet, Take 1 tablet by  "mouth 2 (Two) Times a Day., Disp: 60 tablet, Rfl: 3  •  spironolactone-hydrochlorothiazide (ALDACTAZIDE) 25-25 MG tablet, Take 1 tablet by mouth Daily., Disp: 90 tablet, Rfl: 3    Physical Exam:  Vitals:    10/08/20 1023 10/08/20 1027   BP: 170/70 175/70   BP Location: Left arm Right arm   Patient Position: Sitting Sitting   Cuff Size: Adult Adult   Pulse: 76    SpO2: 96%    Weight: 83 kg (183 lb)    Height: 162.6 cm (64\")      Current Pain Level: none  Pulse Ox: Normal  on room air  General: alert, appears stated age and cooperative     Body Habitus: well-nourished    HEENT: Head: Normocephalic, no lesions, without obvious abnormality. No arcus senilis, xanthelasma or xanthomas.    Neuro: alert, oriented x3  Pulses: 2+ and symmetric  JVP: Volume/Pulsation: Normal.  Normal waveforms.   Appropriate inspiratory decrease.  No Kussmaul's. No Gerardo's.   Carotid Exam: no bruit normal pulsation bilaterally   Carotid Volume: normal.     Respirations: no increased work of breathing   Chest:  Normal    Pulmonary:Normal   Precordium: Normal impulses. P2 is not palpable.  RV Heave: absent  LV Heave: absent  Energy:  normal size and placement  Palpable S4: absent.  Heart rate: normal    Heart Rhythm: regular     Heart Sounds: S1: normal  S2: normal  S3: absent   S4: absent  Opening Snap: absent   Diastolic murmur audible in aortic area.   Pericardial Rub:  Absent: .    Abdomen:   Appearance: normal .  Palpation: Soft, non-tender to palpation, bowel sounds positive in all four quadrants; no guarding or rebound tenderness  Extremity: no edema.   LE Skin: no rashes  LE Hair:  normal  LE Pulses: well perfused with normal pulses in the distal extremities  Pallor on elevation: Absent. Rubor on dependency: None      DATA REVIEWED:     EKG. I personally reviewed and interpreted the EKG.  Not reviewed.    ECG/EMG Results (all)     None        ---------------------------------------------------  TTE/GUSTAVO:  Results for orders placed " during the hospital encounter of 09/04/19   Adult Transthoracic Echo Complete W/ Cont if Necessary Per Protocol    Narrative · Linear echogenic density noted in the ascending aorta in the setting of   previous ascending aortic aneurysm repair could be related to   post-operative changes, however cannot rule out dissection. Color doppler   did not reveal any flow into that space beyond the echogenic density.   Findings are unchanged from previous echocardiogram  · Aneurysmal dilation of the ascending aorta is present  · There is an unknown type of ascending aorta graft noted  · Estimated EF appears to be in the range of 51 - 55%  · Left ventricular systolic function is normal.  · Moderate Aortic regurgitation unchanged from before.  · Moderate pulmonic valve regurgitation is present.  · Mild tricuspid valve regurgitation is present.  · Mild-to-moderate mitral valve regurgitation is present  · Left atrial cavity size is mild-to-moderately dilated.     Findings discussed with the patient. Will perform STAT CTA and referred to   CT surgery.            --------------------------------------------------------------------------------------------------  LABS:     The CVD Risk score (Pricila et al., 2008) failed to calculate for the following reasons:    The 2008 CVD risk score is only valid for ages 30 to 74    The patient has a prior MI, stroke, CHF, or peripheral vascular disease diagnosis         Lab Results   Component Value Date    GLUCOSE 88 01/16/2020    BUN 21 01/16/2020    CREATININE 0.89 01/16/2020    EGFRIFNONA 62 01/16/2020    BCR 23.6 01/16/2020    K 4.6 01/16/2020    CO2 26.7 01/16/2020    CALCIUM 9.5 01/16/2020    ALBUMIN 4.5 07/24/2019    AST 22 07/24/2019    ALT 14 07/24/2019     Lab Results   Component Value Date    WBC 5.9 07/24/2019    HGB 13.2 07/24/2019    HCT 39.8 07/24/2019    MCV 96 (H) 07/24/2019     07/24/2019     Lab Results   Component Value Date    CHOL 162 07/24/2019    CHLPL 143  06/17/2016    TRIG 50 07/24/2019    HDL 57 07/24/2019    LDL 92 07/24/2019     Lab Results   Component Value Date    TSH 1.890 04/26/2018     No results found for: CKTOTAL, CKMB, CKMBINDEX, TROPONINI, TROPONINT  Lab Results   Component Value Date    HGBA1C 5.8 (H) 06/17/2016     No results found for: DDIMER  Lab Results   Component Value Date    ALT 14 07/24/2019     Lab Results   Component Value Date    HGBA1C 5.8 (H) 06/17/2016     Lab Results   Component Value Date    CREATININE 0.89 01/16/2020     No results found for: IRON, TIBC, FERRITIN  No results found for: INR, PROTIME    Assessment/Plan     1. Status post ascending aortic aneurysm repair with no CAD in 2010,  doing well no chest pain or shortness of breath.  Diastolic murmur audible in aortic area.  Concerning findings on echo and CT as above.  Moderate aortic regurgitation.  She is following with thoracic surgery at [place].  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  Per imaging with surgery aneurysm has remained stable in size over the last 1 year.  Due to the risks of reoperative surgery she is currently being monitored closely.  Optimal blood pressure control is recommended.  She has been clean for repeat CTA in a year.    Now that she is complaining of worsening shortness of breath, potentially could be related to her aortic regurgitation progression.  She has an echo coming up next week.  Optimal blood pressure control as below.  Given her 40 mg of Lasix for 4 days to see if that will help with her symptoms.  Also checking BNP, CBC and CMP.       2. Hypertension now on  amlodipine, Aldactazide and sotalol  On  amlodipine 5 mg.  lisinopril 20 mg   Aldactazide 25/25  Sotalol 80mg BID    Plan:  We will increase amlodipine to 10 mg.  See her back next week for blood pressure check    3. Hyperlipidemia continue atorvastatin 20 mg daily.    Check lipid panel prior to next  visit.     4. sick sinus syndrome status post dual-chamber pacemaker, on sotalol for paroxysmal atrial fibrillation and remains in sinus rhythm.    5.  Paroxysmal A. fib  Previously diagnosed, however recent pacemaker check showed that she was in normal sinus rhythm.  Has been on sotalol for rhythm control.  CHADSVASC is 4.   She has not been on anticoagulation, since she has a pacemaker and there is reliable source of there is no recurrence we will continue to hold off anti-coagulation. Also in the setting of her aortic dilation, its ok to hold off AC.           Prevention:  Patient's Body mass index is 31.41 kg/m². BMI is above normal parameters. Recommendations include: exercise counseling and nutrition counseling.      Abril Avilez is not a smoker    AAA Screening:   Not needed            This document has been electronically signed by Marquita Weinberg MD on October 8, 2020 10:31 CDT

## 2020-10-13 ENCOUNTER — TELEPHONE (OUTPATIENT)
Dept: CARDIOLOGY | Facility: CLINIC | Age: 75
End: 2020-10-13

## 2020-10-13 ENCOUNTER — LAB (OUTPATIENT)
Dept: LAB | Facility: OTHER | Age: 75
End: 2020-10-13

## 2020-10-13 ENCOUNTER — DOCUMENTATION (OUTPATIENT)
Dept: CARDIOLOGY | Facility: CLINIC | Age: 75
End: 2020-10-13

## 2020-10-13 DIAGNOSIS — R06.02 SHORTNESS OF BREATH: ICD-10-CM

## 2020-10-13 DIAGNOSIS — R93.89 ABNORMAL FINDINGS ON DIAGNOSTIC IMAGING OF OTHER SPECIFIED BODY STRUCTURES: ICD-10-CM

## 2020-10-13 LAB
ALBUMIN SERPL-MCNC: 3.5 G/DL (ref 3.5–5)
ALBUMIN/GLOB SERPL: 0.8 G/DL (ref 1.1–1.8)
ALP SERPL-CCNC: 83 U/L (ref 38–126)
ALT SERPL W P-5'-P-CCNC: 16 U/L
ANION GAP SERPL CALCULATED.3IONS-SCNC: 9 MMOL/L (ref 5–15)
AST SERPL-CCNC: 26 U/L (ref 14–36)
BILIRUB SERPL-MCNC: 1.3 MG/DL (ref 0.2–1.3)
BNP SERPL-MCNC: 177 PG/ML (ref 0–100)
BUN SERPL-MCNC: 38 MG/DL (ref 7–23)
BUN/CREAT SERPL: 35.5 (ref 7–25)
CALCIUM SPEC-SCNC: 9.6 MG/DL (ref 8.4–10.2)
CHLORIDE SERPL-SCNC: 103 MMOL/L (ref 101–112)
CO2 SERPL-SCNC: 28 MMOL/L (ref 22–30)
CREAT SERPL-MCNC: 1.07 MG/DL (ref 0.52–1.04)
DEPRECATED RDW RBC AUTO: 42.1 FL (ref 37–54)
ERYTHROCYTE [DISTWIDTH] IN BLOOD BY AUTOMATED COUNT: 12.3 % (ref 12.3–15.4)
GFR SERPL CREATININE-BSD FRML MDRD: 50 ML/MIN/1.73 (ref 39–90)
GLOBULIN UR ELPH-MCNC: 4.3 GM/DL (ref 2.3–3.5)
GLUCOSE SERPL-MCNC: 133 MG/DL (ref 70–99)
HCT VFR BLD AUTO: 36.9 % (ref 34–46.6)
HGB BLD-MCNC: 12.3 G/DL (ref 12–15.9)
MCH RBC QN AUTO: 31.9 PG (ref 26.6–33)
MCHC RBC AUTO-ENTMCNC: 33.3 G/DL (ref 31.5–35.7)
MCV RBC AUTO: 95.6 FL (ref 79–97)
PLATELET # BLD AUTO: 176 10*3/MM3 (ref 140–450)
PMV BLD AUTO: 10.8 FL (ref 6–12)
POTASSIUM SERPL-SCNC: 4.4 MMOL/L (ref 3.4–5)
PROT SERPL-MCNC: 7.8 G/DL (ref 6.3–8.6)
RBC # BLD AUTO: 3.86 10*6/MM3 (ref 3.77–5.28)
SODIUM SERPL-SCNC: 140 MMOL/L (ref 137–145)
WBC # BLD AUTO: 5.76 10*3/MM3 (ref 3.4–10.8)

## 2020-10-13 PROCEDURE — 84443 ASSAY THYROID STIM HORMONE: CPT | Performed by: INTERNAL MEDICINE

## 2020-10-13 PROCEDURE — 83880 ASSAY OF NATRIURETIC PEPTIDE: CPT | Performed by: INTERNAL MEDICINE

## 2020-10-13 PROCEDURE — 36415 COLL VENOUS BLD VENIPUNCTURE: CPT | Performed by: INTERNAL MEDICINE

## 2020-10-13 PROCEDURE — 85027 COMPLETE CBC AUTOMATED: CPT | Performed by: INTERNAL MEDICINE

## 2020-10-13 PROCEDURE — 80053 COMPREHEN METABOLIC PANEL: CPT | Performed by: INTERNAL MEDICINE

## 2020-10-13 NOTE — TELEPHONE ENCOUNTER
Marquita Weinberg MD Brown, Karen M, RN             Can you please call her and let her know that her echo looks stable.   Aortic regurgitation is same.   Aorta is same in size.   Normal systolic function     Thank you      Left message for patient to call me to discuss results

## 2020-10-13 NOTE — PROGRESS NOTES
Marquita Weinberg MD Brown, Karen M, RN             Can you please call her and let her know that her echo looks stable.   Aortic regurgitation is same.   Aorta is same in size.   Normal systolic function     Thank you      Patient notified and she had no questions at this time

## 2020-10-14 ENCOUNTER — TELEPHONE (OUTPATIENT)
Dept: CARDIOLOGY | Facility: CLINIC | Age: 75
End: 2020-10-14

## 2020-10-14 LAB — TSH SERPL DL<=0.05 MIU/L-ACNC: 1.6 UIU/ML (ref 0.27–4.2)

## 2020-10-14 NOTE — TELEPHONE ENCOUNTER
Received a fax from Dr Mack for pt's office visit notes to be faxed back to his office. Faxed the most recent office visit note, EKG from that day and the most recent echo results to [phone].

## 2020-10-15 ENCOUNTER — CLINICAL SUPPORT (OUTPATIENT)
Dept: CARDIOLOGY | Facility: CLINIC | Age: 75
End: 2020-10-15

## 2020-10-15 VITALS — SYSTOLIC BLOOD PRESSURE: 120 MMHG | DIASTOLIC BLOOD PRESSURE: 70 MMHG

## 2020-11-11 DIAGNOSIS — I48.0 PAROXYSMAL ATRIAL FIBRILLATION (HCC): Primary | ICD-10-CM

## 2020-11-12 ENCOUNTER — OFFICE VISIT (OUTPATIENT)
Dept: CARDIOLOGY | Facility: CLINIC | Age: 75
End: 2020-11-12

## 2020-11-12 ENCOUNTER — DOCUMENTATION (OUTPATIENT)
Dept: CARDIOLOGY | Facility: CLINIC | Age: 75
End: 2020-11-12

## 2020-11-12 ENCOUNTER — TELEPHONE (OUTPATIENT)
Dept: CARDIOLOGY | Facility: CLINIC | Age: 75
End: 2020-11-12

## 2020-11-12 VITALS
DIASTOLIC BLOOD PRESSURE: 74 MMHG | OXYGEN SATURATION: 99 % | BODY MASS INDEX: 31.45 KG/M2 | SYSTOLIC BLOOD PRESSURE: 122 MMHG | WEIGHT: 184.2 LBS | HEART RATE: 62 BPM | HEIGHT: 64 IN

## 2020-11-12 DIAGNOSIS — R06.02 SHORTNESS OF BREATH: Primary | ICD-10-CM

## 2020-11-12 DIAGNOSIS — I49.5 SSS (SICK SINUS SYNDROME) (HCC): ICD-10-CM

## 2020-11-12 DIAGNOSIS — I48.0 PAROXYSMAL ATRIAL FIBRILLATION (HCC): Primary | ICD-10-CM

## 2020-11-12 DIAGNOSIS — R07.9 CHEST PAIN, UNSPECIFIED TYPE: ICD-10-CM

## 2020-11-12 DIAGNOSIS — I71.20 THORACIC AORTIC ANEURYSM WITHOUT RUPTURE (HCC): ICD-10-CM

## 2020-11-12 DIAGNOSIS — R06.02 SHORTNESS OF BREATH: ICD-10-CM

## 2020-11-12 PROCEDURE — 93000 ELECTROCARDIOGRAM COMPLETE: CPT | Performed by: INTERNAL MEDICINE

## 2020-11-12 PROCEDURE — 99215 OFFICE O/P EST HI 40 MIN: CPT | Performed by: INTERNAL MEDICINE

## 2020-11-12 RX ORDER — ATORVASTATIN CALCIUM 40 MG/1
40 TABLET, FILM COATED ORAL NIGHTLY
COMMUNITY
Start: 2020-11-05

## 2020-11-12 RX ORDER — NITROGLYCERIN 0.4 MG/1
0.4 TABLET SUBLINGUAL
COMMUNITY
Start: 2020-11-05

## 2020-11-12 NOTE — PROGRESS NOTES
RE:  Received: Today  Message Contents   Marquita Weinberg MD Brown, Karen M, RN             Ok let’s start with CT PE first.    Previous Messages    ----- Message -----   From: Susi Wallace, RN   Sent: 11/12/2020  12:08 PM CST   To: Marquita Weinberg MD     I called CT, they said they prefer not to. Its different timing so they would have to give her to doses of contrast. Her creatinine is 1.3. they would like to do 2 visits. What you think sir   ----- Message -----   From: Marquita Weinberg MD   Sent: 11/12/2020   9:26 AM CST   To: Susi Wallace RN     Can you please her her a CTA coronaries and see if they can look for PE at the same time?

## 2020-11-12 NOTE — PROGRESS NOTES
[facility] Cardiology  OFFICE NOTE    Cardiovascular Medicine  Marquita Weinberg M.D., RPVI         No referring provider defined for this encounter.    Thank you for asking me to see Abril Avilez for f/up.    History of Present Illness  This is a 75 y.o. female with:  1. SSS (sick sinus syndrome) (CMS/Formerly [facility])    2. Essential hypertension    3. Pure hypercholesterolemia    4. Paroxysmal atrial fibrillation (CMS/Formerly [facility])          Chief complaint -Follow-up atrial fibrillation        History of present Illness- 75-year-old lady with history of ascending aortic aneurysm repair in 2010, doing reasonably well .  Her blood pressure is well controlled and she had a pacemaker checked  and they were functioning okay.      After last visit patient presented for scheduled out patient echo today to assess her known aortic regurgitation and previous history of aortic repairt.  Possible dissection noted in aorta on echo. Patient sent for CTA of chest. Was noted to have possible dissection and intramural thrombus. I consulted Dr. Cotter in the setting of her previous aortic repair. Dr. Cotter reviewed the films and discussed with Dr. Mack at [facility]. Patient was asymptomatic, per Dr. Cotter's recommendations patient was stable for discharged and to f/up with Dr. Mack, patient has been seen and has been plan for potential second repair and valve replacement.  Patient is currently denying any chest pain or shortness of breath.  she is to report to ER if any sudden chest, back, or shoulder pain of any sudden onset of SOB.     08/28/2020:  No acute issues since last visit.  Denying any chest pain or shortness of breath.  Denying palpitations.    10/08/2020:  Has been closing following with Dr. Gomez at [facility] for her ascending aortic aneurysm.  Was recently seen, noted to have elevated blood pressure and and worsening shortness of breath, here for further evaluation.  Patient did endorse that after exertion her blood  pressure has been elevated at home is within the 150s 160s, she has gained some weight.  Denying orthopnea or PND though.  No lower extremity swelling.    11/12/2020:  Exertional shortness of breath continues to stay the same, had mild improvement with adding Lasix.  She had an episode of chest pain couple of weeks ago, it felt like heaviness in character, it was localized to the substernal area lasted for 20 minutes and then went away on its own.  Did not have any further episodes of chest pain.          Review of Systems - ROS  Constitution: Negative for weakness, weight gain and weight loss.   HENT: Negative for congestion.    Eyes: Negative for blurred vision.   Cardiovascular: As mentioned above  Respiratory: Negative for cough and hemoptysis.    Endocrine: Negative for polydipsia and polyuria.   Hematologic/Lymphatic: Negative for bleeding problem. Does not bruise/bleed easily.   Skin: Negative for flushing.   Musculoskeletal: Negative for neck pain and stiffness.   Gastrointestinal: Negative for abdominal pain, diarrhea, jaundice, melena, nausea and vomiting.   Genitourinary: Negative for dysuria and hematuria.   Neurological: Negative for dizziness, focal weakness and numbness.   Psychiatric/Behavioral: Negative for altered mental status and depression.          All other systems were reviewed and were negative.    family history includes Heart disease in her father and mother.     reports that she has never smoked. She has never used smokeless tobacco. She reports that she does not drink alcohol or use drugs.    Allergies   Allergen Reactions   • Hydroxyzine Headache     Caused migraine headache   • Ampicillin Rash   • Milk-Related Compounds GI Intolerance   • Penicillins Rash   • Pravastatin Mental Status Change     hyperactivity   • Sulfa Antibiotics Hives         Current Outpatient Medications:   •  amLODIPine (NORVASC) 10 MG tablet, Take 1 tablet by mouth Daily., Disp: 90 tablet, Rfl: 3  •  aspirin 81  "MG EC tablet, Take 81 mg by mouth Daily., Disp: , Rfl:   •  atorvastatin (LIPITOR) 40 MG tablet, Take 40 mg by mouth Daily., Disp: , Rfl:   •  furosemide (LASIX) 40 MG tablet, Take 1 tablet by mouth Daily., Disp: 30 tablet, Rfl: 11  •  lisinopril (PRINIVIL,ZESTRIL) 10 MG tablet, Take 2 tablets by mouth once daily (Patient taking differently: 20 mg Daily.), Disp: 180 tablet, Rfl: 0  •  nitroglycerin (NITROSTAT) 0.4 MG SL tablet, Place 0.4 mg under the tongue Every 5 (Five) Minutes As Needed. do not exceed a total of 3 doses in 15 minutes, Disp: , Rfl:   •  sotalol (BETAPACE) 80 MG tablet, Take 1 tablet by mouth 2 (Two) Times a Day., Disp: 60 tablet, Rfl: 3  •  spironolactone-hydrochlorothiazide (ALDACTAZIDE) 25-25 MG tablet, Take 1 tablet by mouth Daily., Disp: 90 tablet, Rfl: 3    Physical Exam:  Vitals:    11/12/20 0849   BP: 122/74   Pulse: 62   SpO2: 99%   Weight: 83.6 kg (184 lb 3.2 oz)   Height: 162.6 cm (64\")   PainSc: 0-No pain     Current Pain Level: none  Pulse Ox: Normal  on room air  General: alert, appears stated age and cooperative     Body Habitus: well-nourished    HEENT: Head: Normocephalic, no lesions, without obvious abnormality. No arcus senilis, xanthelasma or xanthomas.    Neuro: alert, oriented x3  Pulses: 2+ and symmetric  JVP: Volume/Pulsation: Normal.  Normal waveforms.   Appropriate inspiratory decrease.  No Kussmaul's. No Gerardo's.   Carotid Exam: no bruit normal pulsation bilaterally   Carotid Volume: normal.     Respirations: no increased work of breathing   Chest:  Normal    Pulmonary:Normal   Precordium: Normal impulses. P2 is not palpable.  RV Heave: absent  LV Heave: absent  Dixon:  normal size and placement  Palpable S4: absent.  Heart rate: normal    Heart Rhythm: regular     Heart Sounds: S1: normal  S2: normal  S3: absent   S4: absent  Opening Snap: absent   Diastolic murmur audible in aortic area.   Pericardial Rub:  Absent: .    Abdomen:   Appearance: normal .  Palpation: " Soft, non-tender to palpation, bowel sounds positive in all four quadrants; no guarding or rebound tenderness  Extremity: no edema.   LE Skin: no rashes  LE Hair:  normal  LE Pulses: well perfused with normal pulses in the distal extremities  Pallor on elevation: Absent. Rubor on dependency: None      DATA REVIEWED:     EKG. I personally reviewed and interpreted the EKG.  Atrial paced ventricular sensing    ECG/EMG Results (all)     None        ---------------------------------------------------  TTE/GUSTAVO:  Results for orders placed during the hospital encounter of 10/12/20   Adult Transthoracic Echo Complete W/ Cont if Necessary Per Protocol    Narrative · Aneurysmal dilation of the proximal aorta is present. There is an   unknown type of proximal aorta graft noted. Ascending aorta measures 4.5cm   in diameter (Unchangde from before). Linear echogenic density noted in the   ascending aorta which appears unchanged from prior echo. In the setting of   previous ascending aortic aneurysm repair most likely related to   post-operative changes. Color doppler did not reveal any flow into the   space beyond the echogenic density.  · Left ventricular ejection fraction appears to be 51 - 55%. Left   ventricular systolic function is normal.  · The left atrial cavity is moderately dilated.  · Aneurysmal dilation of the proximal aorta is present.  · Moderate aortic valve regurgitation is present.  · Mild to moderate mitral valve regurgitation is present.  · Estimated right ventricular systolic pressure from tricuspid   regurgitation is normal (<35 mmHg).            --------------------------------------------------------------------------------------------------  LABS:     The CVD Risk score (Pricila et al., 2008) failed to calculate for the following reasons:    The 2008 CVD risk score is only valid for ages 30 to 74    The patient has a prior MI, stroke, CHF, or peripheral vascular disease diagnosis         Lab Results      Component Value Date    GLUCOSE 133 (H) 10/13/2020    BUN 38 (H) 10/13/2020    CREATININE 1.07 (H) 10/13/2020    EGFRIFNONA 50 10/13/2020    BCR 35.5 (H) 10/13/2020    K 4.4 10/13/2020    CO2 28.0 10/13/2020    CALCIUM 9.6 10/13/2020    ALBUMIN 3.50 10/13/2020    AST 26 10/13/2020    ALT 16 10/13/2020     Lab Results   Component Value Date    WBC 5.76 10/13/2020    HGB 12.3 10/13/2020    HCT 36.9 10/13/2020    MCV 95.6 10/13/2020     10/13/2020     Lab Results   Component Value Date    CHOL 185 11/03/2020    CHLPL 143 06/17/2016    TRIG 87 11/03/2020    HDL 48 11/03/2020     (H) 11/03/2020     Lab Results   Component Value Date    TSH 1.600 10/13/2020     No results found for: CKTOTAL, CKMB, CKMBINDEX, TROPONINI, TROPONINT  Lab Results   Component Value Date    HGBA1C 5.8 (H) 06/17/2016     No results found for: DDIMER  Lab Results   Component Value Date    ALT 16 10/13/2020     Lab Results   Component Value Date    HGBA1C 5.8 (H) 06/17/2016     Lab Results   Component Value Date    CREATININE 1.07 (H) 10/13/2020     No results found for: IRON, TIBC, FERRITIN  No results found for: INR, PROTIME    Assessment/Plan     1. Status post ascending aortic aneurysm repair with no CAD in 2010,  doing well no chest pain or shortness of breath.  Diastolic murmur audible in aortic area.  Concerning findings on echo and CT as above.  Moderate aortic regurgitation.  She is following with thoracic surgery at [place].  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  Per imaging with surgery aneurysm has remained stable in size over the last 1 year.  Due to the risks of reoperative surgery she is currently being monitored closely.  Optimal blood pressure control is recommended.  She has been scheduled for repeat CTA in a year.    Last visit patient was complaining of worsening shortness of breath, so we repeated echo which was unchanged, aortic  regurgitation is still moderate and LV fucntion and size are mary. Continue 40mg mg of Lasix     Plan:  Now the patient is complaining of worsening shortness of with normal also an episode of chest pain we will plan performing a CT coronary angiogram at the same time we will assess for PE.  If no other cause for her shortness of breath and chest pain then will touch base with surgery to assess for potential valve replacement since her aortic regurgitation could be causing her symptoms.     2. Hypertension now on  Amlodipine 10mg, Aldactazide and sotalol  On  amlodipine 5 mg.  lisinopril 20 mg   Aldactazide 25/25  Sotalol 80mg BID    Blood pressure is better controlled now    3. Hyperlipidemia LDL>100, will increased lipitor to 40mgt.     4. sick sinus syndrome status post dual-chamber pacemaker, on sotalol for paroxysmal atrial fibrillation and remains in sinus rhythm.    5.  Paroxysmal A. fib  Previously diagnosed, however recent pacemaker check showed that she was in normal sinus rhythm.  Has been on sotalol for rhythm control.  CHADSVASC is 4.   She has not been on anticoagulation, since she has a pacemaker and there is reliable source of there is no recurrence we will continue to hold off anti-coagulation. Also in the setting of her aortic dilation, its ok to hold off AC.           Prevention:  Patient's Body mass index is 31.62 kg/m². BMI is above normal parameters. Recommendations include: exercise counseling and nutrition counseling.      Abril Carranza Muslim is not a smoker    AAA Screening:   Not needed            This document has been electronically signed by Marquita Weinberg MD on November 12, 2020 09:26 CST

## 2020-11-17 LAB
QT INTERVAL: 220 MS
QTC INTERVAL: 314 MS

## 2020-11-18 ENCOUNTER — HOSPITAL ENCOUNTER (OUTPATIENT)
Dept: CT IMAGING | Facility: HOSPITAL | Age: 75
End: 2020-11-18

## 2020-11-23 ENCOUNTER — TELEPHONE (OUTPATIENT)
Dept: CARDIOLOGY | Facility: CLINIC | Age: 75
End: 2020-11-23

## 2020-11-23 ENCOUNTER — APPOINTMENT (OUTPATIENT)
Dept: CT IMAGING | Facility: HOSPITAL | Age: 75
End: 2020-11-23

## 2020-11-23 NOTE — TELEPHONE ENCOUNTER
Patient needs to increase water intake today and tomorrow in order for hydration before administration of IV contrast with CT scan.    Patient's  verbalized understanding

## 2020-11-24 ENCOUNTER — HOSPITAL ENCOUNTER (OUTPATIENT)
Dept: CT IMAGING | Facility: HOSPITAL | Age: 75
Discharge: HOME | End: 2020-11-24
Admitting: INTERNAL MEDICINE

## 2020-11-24 DIAGNOSIS — R06.02 SHORTNESS OF BREATH: ICD-10-CM

## 2020-11-24 DIAGNOSIS — R07.9 CHEST PAIN, UNSPECIFIED TYPE: ICD-10-CM

## 2020-11-24 PROCEDURE — 0 IOPAMIDOL PER 1 ML: Performed by: INTERNAL MEDICINE

## 2020-11-24 PROCEDURE — 75574 CT ANGIO HRT W/3D IMAGE: CPT

## 2020-11-24 RX ADMIN — IOPAMIDOL 90 ML: 755 INJECTION, SOLUTION INTRAVENOUS at 10:17

## 2020-11-25 DIAGNOSIS — R07.9 CHEST PAIN, UNSPECIFIED TYPE: Primary | ICD-10-CM

## 2020-11-25 DIAGNOSIS — R06.02 SHORTNESS OF BREATH: ICD-10-CM

## 2020-11-25 DIAGNOSIS — R93.1 ELEVATED CORONARY ARTERY CALCIUM SCORE: ICD-10-CM

## 2020-11-25 DIAGNOSIS — R93.89 ABNORMAL COMPUTED TOMOGRAPHY ANGIOGRAPHY (CTA): ICD-10-CM

## 2020-11-25 RX ORDER — SODIUM CHLORIDE 0.9 % (FLUSH) 0.9 %
3 SYRINGE (ML) INJECTION EVERY 12 HOURS SCHEDULED
Status: CANCELLED | OUTPATIENT
Start: 2020-12-01

## 2020-11-25 RX ORDER — SODIUM CHLORIDE 9 MG/ML
100 INJECTION, SOLUTION INTRAVENOUS CONTINUOUS
Status: CANCELLED | OUTPATIENT
Start: 2020-12-01

## 2020-11-25 RX ORDER — SODIUM CHLORIDE 0.9 % (FLUSH) 0.9 %
10 SYRINGE (ML) INJECTION AS NEEDED
Status: CANCELLED | OUTPATIENT
Start: 2020-12-01

## 2020-11-25 NOTE — PROGRESS NOTES
Marquita Weinberg MD Brown, Karen M, RN             Glad we did CT coronary first. Will need cath.   Can you please get me contact no for Dr. Peña, her vascular surgeon in [place], to see if she has critical disease, if they are ok with me fixing it or should get CABG at the time of repair of her aorta.      Dr Weinberg has spoken with patient's vascular dr and there are no plans as of yet for aortic aneurysm repair so patient needs to be set up for PCI.  Patient understands and is agreeable. Left heart cath scheduled for Tuesday December 1st per Alexis' request. Patient given date and instructions. Patient given date time and instructions for pre op covid appt. Understanding was verbalized

## 2020-11-28 ENCOUNTER — LAB (OUTPATIENT)
Dept: LAB | Facility: HOSPITAL | Age: 75
End: 2020-11-28

## 2020-11-28 DIAGNOSIS — Z01.818 PREOP TESTING: Primary | ICD-10-CM

## 2020-11-28 PROCEDURE — U0003 INFECTIOUS AGENT DETECTION BY NUCLEIC ACID (DNA OR RNA); SEVERE ACUTE RESPIRATORY SYNDROME CORONAVIRUS 2 (SARS-COV-2) (CORONAVIRUS DISEASE [COVID-19]), AMPLIFIED PROBE TECHNIQUE, MAKING USE OF HIGH THROUGHPUT TECHNOLOGIES AS DESCRIBED BY CMS-2020-01-R: HCPCS

## 2020-11-28 PROCEDURE — C9803 HOPD COVID-19 SPEC COLLECT: HCPCS

## 2020-11-29 LAB
COVID LABCORP PRIORITY: NORMAL
SARS-COV-2 RNA RESP QL NAA+PROBE: NOT DETECTED

## 2020-11-30 ENCOUNTER — APPOINTMENT (OUTPATIENT)
Dept: CT IMAGING | Facility: HOSPITAL | Age: 75
End: 2020-11-30

## 2020-12-01 ENCOUNTER — HOSPITAL ENCOUNTER (OUTPATIENT)
Facility: HOSPITAL | Age: 75
Setting detail: HOSPITAL OUTPATIENT SURGERY
Discharge: HOME | End: 2020-12-01
Attending: INTERNAL MEDICINE | Admitting: INTERNAL MEDICINE

## 2020-12-01 VITALS
HEIGHT: 64 IN | WEIGHT: 182.98 LBS | TEMPERATURE: 97.9 F | RESPIRATION RATE: 20 BRPM | HEART RATE: 60 BPM | DIASTOLIC BLOOD PRESSURE: 61 MMHG | OXYGEN SATURATION: 99 % | SYSTOLIC BLOOD PRESSURE: 117 MMHG | BODY MASS INDEX: 31.24 KG/M2

## 2020-12-01 DIAGNOSIS — R93.89 ABNORMAL COMPUTED TOMOGRAPHY ANGIOGRAPHY (CTA): ICD-10-CM

## 2020-12-01 DIAGNOSIS — R93.1 ELEVATED CORONARY ARTERY CALCIUM SCORE: ICD-10-CM

## 2020-12-01 DIAGNOSIS — R07.9 CHEST PAIN, UNSPECIFIED TYPE: ICD-10-CM

## 2020-12-01 DIAGNOSIS — R06.02 SHORTNESS OF BREATH: ICD-10-CM

## 2020-12-01 LAB
ANION GAP SERPL CALCULATED.3IONS-SCNC: 13 MMOL/L (ref 5–15)
BUN SERPL-MCNC: 89 MG/DL (ref 8–23)
BUN/CREAT SERPL: 43.8 (ref 7–25)
CALCIUM SPEC-SCNC: 9.5 MG/DL (ref 8.6–10.5)
CHLORIDE SERPL-SCNC: 98 MMOL/L (ref 98–107)
CO2 SERPL-SCNC: 26 MMOL/L (ref 22–29)
CREAT SERPL-MCNC: 2.03 MG/DL (ref 0.57–1)
DEPRECATED RDW RBC AUTO: 41.6 FL (ref 37–54)
ERYTHROCYTE [DISTWIDTH] IN BLOOD BY AUTOMATED COUNT: 11.9 % (ref 12.3–15.4)
GFR SERPL CREATININE-BSD FRML MDRD: 24 ML/MIN/1.73
GLUCOSE SERPL-MCNC: 121 MG/DL (ref 65–99)
HCT VFR BLD AUTO: 37.6 % (ref 34–46.6)
HGB BLD-MCNC: 12.6 G/DL (ref 12–15.9)
INR PPP: 0.95 (ref 0.8–1.2)
MCH RBC QN AUTO: 31.3 PG (ref 26.6–33)
MCHC RBC AUTO-ENTMCNC: 33.5 G/DL (ref 31.5–35.7)
MCV RBC AUTO: 93.3 FL (ref 79–97)
PLATELET # BLD AUTO: 193 10*3/MM3 (ref 140–450)
PMV BLD AUTO: 11.2 FL (ref 6–12)
POTASSIUM SERPL-SCNC: 4.5 MMOL/L (ref 3.5–5.2)
PROTHROMBIN TIME: 13 SECONDS (ref 11.1–15.3)
RBC # BLD AUTO: 4.03 10*6/MM3 (ref 3.77–5.28)
SODIUM SERPL-SCNC: 137 MMOL/L (ref 136–145)
WBC # BLD AUTO: 7.45 10*3/MM3 (ref 3.4–10.8)

## 2020-12-01 PROCEDURE — 85027 COMPLETE CBC AUTOMATED: CPT | Performed by: INTERNAL MEDICINE

## 2020-12-01 PROCEDURE — 85610 PROTHROMBIN TIME: CPT | Performed by: INTERNAL MEDICINE

## 2020-12-01 PROCEDURE — 80048 BASIC METABOLIC PNL TOTAL CA: CPT | Performed by: INTERNAL MEDICINE

## 2020-12-01 RX ORDER — SODIUM CHLORIDE 0.9 % (FLUSH) 0.9 %
3 SYRINGE (ML) INJECTION EVERY 12 HOURS SCHEDULED
Status: DISCONTINUED | OUTPATIENT
Start: 2020-12-01 | End: 2020-12-01 | Stop reason: HOSPADM

## 2020-12-01 RX ORDER — SODIUM CHLORIDE 0.9 % (FLUSH) 0.9 %
10 SYRINGE (ML) INJECTION AS NEEDED
Status: DISCONTINUED | OUTPATIENT
Start: 2020-12-01 | End: 2020-12-01 | Stop reason: HOSPADM

## 2020-12-01 RX ORDER — SODIUM CHLORIDE 9 MG/ML
100 INJECTION, SOLUTION INTRAVENOUS CONTINUOUS
Status: DISCONTINUED | OUTPATIENT
Start: 2020-12-01 | End: 2020-12-01 | Stop reason: HOSPADM

## 2020-12-01 RX ORDER — ISOSORBIDE MONONITRATE 120 MG/1
120 TABLET, EXTENDED RELEASE ORAL DAILY
Qty: 30 TABLET | Refills: 3 | Status: SHIPPED | OUTPATIENT
Start: 2020-12-01 | End: 2021-03-29

## 2020-12-01 RX ORDER — DILTIAZEM HYDROCHLORIDE 120 MG/1
120 CAPSULE, COATED, EXTENDED RELEASE ORAL DAILY
Qty: 30 CAPSULE | Refills: 11 | Status: SHIPPED | OUTPATIENT
Start: 2020-12-01 | End: 2021-12-06

## 2020-12-01 RX ADMIN — SODIUM CHLORIDE 100 ML/HR: 9 INJECTION, SOLUTION INTRAVENOUS at 10:15

## 2020-12-01 NOTE — PROGRESS NOTES
Patient is here for cardiac cath after abnormal CTA.  Preop blood work revealed SADIE with creatinine of 2.  Patient had received contrast for CTA, and has been on Lasix and lisinopril.  Patient will be given gentle IV fluid hydration, will postpone the cardiac cath.  I will stop her Lasix and lisinopril  We will add Cardizem and Imdur instead, she needs optimal blood pressure control in setting of her aortic aneurysm  We will repeat BMP on Friday.  Advised her if she has worsening chest pain or signs and symptoms of renal failure to come back to ER  We will refer her to nephrology as well.  We will reschedule cardiac cath once creatinine has recovered

## 2020-12-02 ENCOUNTER — DOCUMENTATION (OUTPATIENT)
Dept: CARDIOLOGY | Facility: CLINIC | Age: 75
End: 2020-12-02

## 2020-12-02 ENCOUNTER — TELEPHONE (OUTPATIENT)
Dept: CARDIOLOGY | Facility: CLINIC | Age: 75
End: 2020-12-02

## 2020-12-02 DIAGNOSIS — N17.9 AKI (ACUTE KIDNEY INJURY) (HCC): Primary | ICD-10-CM

## 2020-12-02 NOTE — PROGRESS NOTES
Sherron Avery RegSched Rep Brown, Karen M, RN             Referral is faxed, I will call and check on it this afternoon.     Sherron    Previous Messages    ----- Message -----   From: Susi Wallace, RN   Sent: 12/2/2020   8:25 AM CST   To: Susie Soto     Patient needs to see nephrology soon per Dr Weinberg for acute kidney injury. She needs a heart cath but can't schedule because acute kidney injury. She was actually in sds ready for cath and when her creatinine came back elevated it was canceled     Thanks

## 2020-12-02 NOTE — TELEPHONE ENCOUNTER
Called patient to let her know she needs to go to lab Friday before coming to appt. Order changed to stat status

## 2020-12-03 ENCOUNTER — DOCUMENTATION (OUTPATIENT)
Dept: CARDIOLOGY | Facility: CLINIC | Age: 75
End: 2020-12-03

## 2020-12-03 NOTE — PROGRESS NOTES
Sherron Avery RegSched Rep Brown, Karen M, RN Amy from 's office called they have her scheduled 12-10-20 @ 12, they left her a VM.     Sherron

## 2020-12-03 NOTE — PROGRESS NOTES
Sherron Avery RegSched Rep Brown, Karen M, RN             Per Allegra @ 's office they have the referral and will call her this afternoon.     Sherron

## 2020-12-04 ENCOUNTER — LAB (OUTPATIENT)
Dept: LAB | Facility: HOSPITAL | Age: 75
End: 2020-12-04

## 2020-12-04 ENCOUNTER — OFFICE VISIT (OUTPATIENT)
Dept: CARDIOLOGY | Facility: CLINIC | Age: 75
End: 2020-12-04

## 2020-12-04 VITALS
BODY MASS INDEX: 31.58 KG/M2 | HEIGHT: 64 IN | OXYGEN SATURATION: 98 % | SYSTOLIC BLOOD PRESSURE: 108 MMHG | HEART RATE: 72 BPM | DIASTOLIC BLOOD PRESSURE: 58 MMHG | WEIGHT: 185 LBS

## 2020-12-04 DIAGNOSIS — I48.0 PAROXYSMAL ATRIAL FIBRILLATION (HCC): Primary | ICD-10-CM

## 2020-12-04 DIAGNOSIS — N17.9 AKI (ACUTE KIDNEY INJURY) (HCC): ICD-10-CM

## 2020-12-04 LAB
ANION GAP SERPL CALCULATED.3IONS-SCNC: 9 MMOL/L (ref 5–15)
BUN SERPL-MCNC: 55 MG/DL (ref 8–23)
BUN/CREAT SERPL: 40.4 (ref 7–25)
CALCIUM SPEC-SCNC: 9.5 MG/DL (ref 8.6–10.5)
CHLORIDE SERPL-SCNC: 104 MMOL/L (ref 98–107)
CO2 SERPL-SCNC: 25 MMOL/L (ref 22–29)
CREAT SERPL-MCNC: 1.36 MG/DL (ref 0.57–1)
GFR SERPL CREATININE-BSD FRML MDRD: 38 ML/MIN/1.73
GLUCOSE SERPL-MCNC: 112 MG/DL (ref 65–99)
POTASSIUM SERPL-SCNC: 4.8 MMOL/L (ref 3.5–5.2)
SODIUM SERPL-SCNC: 138 MMOL/L (ref 136–145)

## 2020-12-04 PROCEDURE — 80048 BASIC METABOLIC PNL TOTAL CA: CPT

## 2020-12-04 PROCEDURE — 36415 COLL VENOUS BLD VENIPUNCTURE: CPT

## 2020-12-04 PROCEDURE — 93000 ELECTROCARDIOGRAM COMPLETE: CPT | Performed by: INTERNAL MEDICINE

## 2020-12-04 PROCEDURE — 99215 OFFICE O/P EST HI 40 MIN: CPT | Performed by: INTERNAL MEDICINE

## 2020-12-04 NOTE — PROGRESS NOTES
[facility] Cardiology  OFFICE NOTE    Cardiovascular Medicine  Marquita Weinberg M.D., RPVI         No referring provider defined for this encounter.    Thank you for asking me to see Abril Avilez for f/up.    History of Present Illness  This is a 75 y.o. female with:  1. SSS (sick sinus syndrome) (CMS/[facility])    2. Essential hypertension    3. Pure hypercholesterolemia    4. Paroxysmal atrial fibrillation (CMS/[facility])          Chief complaint -Follow-up atrial fibrillation        History of present Illness- 75-year-old lady with history of ascending aortic aneurysm repair in 2010, doing reasonably well .  Her blood pressure is well controlled and she had a pacemaker checked  and they were functioning okay.      After last visit patient presented for scheduled out patient echo today to assess her known aortic regurgitation and previous history of aortic repairt.  Possible dissection noted in aorta on echo. Patient sent for CTA of chest. Was noted to have possible dissection and intramural thrombus. I consulted Dr. Cotter in the setting of her previous aortic repair. Dr. Cotter reviewed the films and discussed with Dr. Mack at [facility]. Patient was asymptomatic, per Dr. Cotter's recommendations patient was stable for discharged and to f/up with Dr. Mack, patient has been seen and has been plan for potential second repair and valve replacement.  Patient is currently denying any chest pain or shortness of breath.  she is to report to ER if any sudden chest, back, or shoulder pain of any sudden onset of SOB.     08/28/2020:  No acute issues since last visit.  Denying any chest pain or shortness of breath.  Denying palpitations.    10/08/2020:  Has been closing following with Dr. Gomez at [facility] for her ascending aortic aneurysm.  Was recently seen, noted to have elevated blood pressure and and worsening shortness of breath, here for further evaluation.  Patient did endorse that after exertion her blood  pressure has been elevated at home is within the 150s 160s, she has gained some weight.  Denying orthopnea or PND though.  No lower extremity swelling.    11/12/2020:  Exertional shortness of breath continues to stay the same, had mild improvement with adding Lasix.  She had an episode of chest pain couple of weeks ago, it felt like heaviness in character, it was localized to the substernal area lasted for 20 minutes and then went away on its own.  Did not have any further episodes of chest pain.    12/04/2020:  Patient underwent CTA since last visit which showed obstructive disease in LAD.  Patient was scheduled for a cardiac cath however her creatinine was elevated and she had SADIE.  She was given IV fluid hydration, adjustments were made in her antihypertensives.  She is here for routine follow-up.  Creatinine has improved.    No further chest pains.      Review of Systems - ROS  Constitution: Negative for weakness, weight gain and weight loss.   HENT: Negative for congestion.    Eyes: Negative for blurred vision.   Cardiovascular: As mentioned above  Respiratory: Negative for cough and hemoptysis.    Endocrine: Negative for polydipsia and polyuria.   Hematologic/Lymphatic: Negative for bleeding problem. Does not bruise/bleed easily.   Skin: Negative for flushing.   Musculoskeletal: Negative for neck pain and stiffness.   Gastrointestinal: Negative for abdominal pain, diarrhea, jaundice, melena, nausea and vomiting.   Genitourinary: Negative for dysuria and hematuria.   Neurological: Negative for dizziness, focal weakness and numbness.   Psychiatric/Behavioral: Negative for altered mental status and depression.          All other systems were reviewed and were negative.    family history includes Heart disease in her father and mother.     reports that she has never smoked. She has never used smokeless tobacco. She reports that she does not drink alcohol or use drugs.    Allergies   Allergen Reactions   •  "Hydroxyzine Headache     Caused migraine headache   • Ampicillin Rash   • Milk-Related Compounds GI Intolerance   • Penicillins Rash   • Pravastatin Mental Status Change     hyperactivity   • Sulfa Antibiotics Hives         Current Outpatient Medications:   •  amLODIPine (NORVASC) 10 MG tablet, Take 1 tablet by mouth Daily., Disp: 90 tablet, Rfl: 3  •  aspirin 81 MG EC tablet, Take 81 mg by mouth Daily., Disp: , Rfl:   •  atorvastatin (LIPITOR) 40 MG tablet, Take 40 mg by mouth Daily., Disp: , Rfl:   •  dilTIAZem CD (CARDIZEM CD) 120 MG 24 hr capsule, Take 1 capsule by mouth Daily., Disp: 30 capsule, Rfl: 11  •  isosorbide mononitrate (IMDUR) 120 MG 24 hr tablet, Take 1 tablet by mouth Daily., Disp: 30 tablet, Rfl: 3  •  nitroglycerin (NITROSTAT) 0.4 MG SL tablet, Place 0.4 mg under the tongue Every 5 (Five) Minutes As Needed. do not exceed a total of 3 doses in 15 minutes, Disp: , Rfl:   •  sotalol (BETAPACE) 80 MG tablet, Take 1 tablet by mouth 2 (Two) Times a Day., Disp: 60 tablet, Rfl: 3  •  spironolactone-hydrochlorothiazide (ALDACTAZIDE) 25-25 MG tablet, Take 1 tablet by mouth Daily., Disp: 90 tablet, Rfl: 3    Physical Exam:  Vitals:    12/04/20 1038   BP: 108/58   Pulse: 72   SpO2: 98%   Weight: 83.9 kg (185 lb)   Height: 162.6 cm (64\")   PainSc: 0-No pain     Current Pain Level: none  Pulse Ox: Normal  on room air  General: alert, appears stated age and cooperative     Body Habitus: well-nourished    HEENT: Head: Normocephalic, no lesions, without obvious abnormality. No arcus senilis, xanthelasma or xanthomas.    Neuro: alert, oriented x3  Pulses: 2+ and symmetric  JVP: Volume/Pulsation: Normal.  Normal waveforms.   Appropriate inspiratory decrease.  No Kussmaul's. No Gerardo's.   Carotid Exam: no bruit normal pulsation bilaterally   Carotid Volume: normal.     Respirations: no increased work of breathing   Chest:  Normal    Pulmonary:Normal   Precordium: Normal impulses. P2 is not palpable.  RV Heave: " absent  LV Heave: absent  Dunnigan:  normal size and placement  Palpable S4: absent.  Heart rate: normal    Heart Rhythm: regular     Heart Sounds: S1: normal  S2: normal  S3: absent   S4: absent  Opening Snap: absent   Diastolic murmur audible in aortic area.   Pericardial Rub:  Absent: .    Abdomen:   Appearance: normal .  Palpation: Soft, non-tender to palpation, bowel sounds positive in all four quadrants; no guarding or rebound tenderness  Extremity: no edema.   LE Skin: no rashes  LE Hair:  normal  LE Pulses: well perfused with normal pulses in the distal extremities  Pallor on elevation: Absent. Rubor on dependency: None      DATA REVIEWED:     EKG. I personally reviewed and interpreted the EKG.  Atrial paced ventricular sensing    ECG/EMG Results (all)     None        ---------------------------------------------------  TTE/GUSTAVO:  Results for orders placed during the hospital encounter of 10/12/20   Adult Transthoracic Echo Complete W/ Cont if Necessary Per Protocol    Narrative · Aneurysmal dilation of the proximal aorta is present. There is an   unknown type of proximal aorta graft noted. Ascending aorta measures 4.5cm   in diameter (Unchangde from before). Linear echogenic density noted in the   ascending aorta which appears unchanged from prior echo. In the setting of   previous ascending aortic aneurysm repair most likely related to   post-operative changes. Color doppler did not reveal any flow into the   space beyond the echogenic density.  · Left ventricular ejection fraction appears to be 51 - 55%. Left   ventricular systolic function is normal.  · The left atrial cavity is moderately dilated.  · Aneurysmal dilation of the proximal aorta is present.  · Moderate aortic valve regurgitation is present.  · Mild to moderate mitral valve regurgitation is present.  · Estimated right ventricular systolic pressure from tricuspid   regurgitation is normal (<35 mmHg).                     --------------------------------------------------------------------------------------------------  LABS:     The CVD Risk score (Pricila et al., 2008) failed to calculate for the following reasons:    The 2008 CVD risk score is only valid for ages 30 to 74    The patient has a prior MI, stroke, CHF, or peripheral vascular disease diagnosis         Lab Results   Component Value Date    GLUCOSE 112 (H) 12/04/2020    BUN 55 (H) 12/04/2020    CREATININE 1.36 (H) 12/04/2020    EGFRIFNONA 38 (L) 12/04/2020    BCR 40.4 (H) 12/04/2020    K 4.8 12/04/2020    CO2 25.0 12/04/2020    CALCIUM 9.5 12/04/2020    ALBUMIN 3.50 10/13/2020    AST 26 10/13/2020    ALT 16 10/13/2020     Lab Results   Component Value Date    WBC 7.45 12/01/2020    HGB 12.6 12/01/2020    HCT 37.6 12/01/2020    MCV 93.3 12/01/2020     12/01/2020     Lab Results   Component Value Date    CHOL 185 11/03/2020    CHLPL 143 06/17/2016    TRIG 87 11/03/2020    HDL 48 11/03/2020     (H) 11/03/2020     Lab Results   Component Value Date    TSH 1.600 10/13/2020     No results found for: CKTOTAL, CKMB, CKMBINDEX, TROPONINI, TROPONINT  Lab Results   Component Value Date    HGBA1C 5.8 (H) 06/17/2016     No results found for: DDIMER  Lab Results   Component Value Date    ALT 16 10/13/2020     Lab Results   Component Value Date    HGBA1C 5.8 (H) 06/17/2016     Lab Results   Component Value Date    CREATININE 1.36 (H) 12/04/2020     No results found for: IRON, TIBC, FERRITIN  Lab Results   Component Value Date    INR 0.95 12/01/2020    PROTIME 13.0 12/01/2020       Assessment/Plan     1. Status post ascending aortic aneurysm repair with no CAD in 2010,  doing well no chest pain or shortness of breath.  Diastolic murmur audible in aortic area.  Concerning findings on echo and CT as above.  Moderate aortic regurgitation.  She is following with thoracic surgery at [place].  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  Per imaging with surgery aneurysm has remained stable in size over the last 1 year.  Due to the risks of reoperative surgery she is currently being monitored closely.  Optimal blood pressure control is recommended.  She has been scheduled for repeat CTA in a year.    Last visit patient was complaining of worsening shortness of breath, so we repeated echo which was unchanged, aortic regurgitation is still moderate and LV fucntion and size are mary. Continue 40mg mg of Lasix     Plan:  Patient does have significant disease in her LAD which could have been causing shortness of breath and chest pain.  Discussed with the CT surgery at [place], no plans for surgeries in the near future.  Surgery is okay with proceeding with PCI if needed.     2. Hypertension now:  Amlodipine 10mg,   Cardizem 120 mg  Imdur 120 mg  lisinopril 20 mg was stopped because of SADIE  Aldactazide 25/25  Sotalol 80mg BID    Blood pressure is better controlled now    3. Hyperlipidemia LDL>100, will increased lipitor to 40mgt.     4. sick sinus syndrome status post dual-chamber pacemaker, on sotalol for paroxysmal atrial fibrillation and remains in sinus rhythm.    5.  Paroxysmal A. fib  Previously diagnosed, however recent pacemaker check showed that she was in normal sinus rhythm.  Has been on sotalol for rhythm control.  CHADSVASC is 4.   She has not been on anticoagulation, since she has a pacemaker and there is reliable source of there is no recurrence we will continue to hold off anti-coagulation. Also in the setting of her aortic dilation, its ok to hold off AC.     6.  Coronary artery disease:  Significant disease noted in the LAD on CTA.  Scheduled for cardiac cath this week however creatinine was elevated.  Creatinine is coming down, will plan cardiac cath in a week.  Continue aspirin  Continue Imdur/Cardizem.  Advised her to come to the ER if you have significant chest  pain.      Prevention:  Patient's Body mass index is 31.76 kg/m². BMI is above normal parameters. Recommendations include: exercise counseling and nutrition counseling.      Abril Avilez is not a smoker    AAA Screening:   Not needed            This document has been electronically signed by Marquita Weinberg MD on December 4, 2020 10:58 CST

## 2020-12-04 NOTE — H&P (VIEW-ONLY)
[facility] Cardiology  OFFICE NOTE    Cardiovascular Medicine  Marquita Weinberg M.D., RPVI         No referring provider defined for this encounter.    Thank you for asking me to see Abril Avilez for f/up.    History of Present Illness  This is a 75 y.o. female with:  1. SSS (sick sinus syndrome) (CMS/[facility])    2. Essential hypertension    3. Pure hypercholesterolemia    4. Paroxysmal atrial fibrillation (CMS/[facility])          Chief complaint -Follow-up atrial fibrillation        History of present Illness- 75-year-old lady with history of ascending aortic aneurysm repair in 2010, doing reasonably well .  Her blood pressure is well controlled and she had a pacemaker checked  and they were functioning okay.      After last visit patient presented for scheduled out patient echo today to assess her known aortic regurgitation and previous history of aortic repairt.  Possible dissection noted in aorta on echo. Patient sent for CTA of chest. Was noted to have possible dissection and intramural thrombus. I consulted Dr. Cotter in the setting of her previous aortic repair. Dr. Cotter reviewed the films and discussed with Dr. Mack at [facility]. Patient was asymptomatic, per Dr. Cotter's recommendations patient was stable for discharged and to f/up with Dr. Mack, patient has been seen and has been plan for potential second repair and valve replacement.  Patient is currently denying any chest pain or shortness of breath.  she is to report to ER if any sudden chest, back, or shoulder pain of any sudden onset of SOB.     08/28/2020:  No acute issues since last visit.  Denying any chest pain or shortness of breath.  Denying palpitations.    10/08/2020:  Has been closing following with Dr. Gomez at [facility] for her ascending aortic aneurysm.  Was recently seen, noted to have elevated blood pressure and and worsening shortness of breath, here for further evaluation.  Patient did endorse that after exertion her blood  pressure has been elevated at home is within the 150s 160s, she has gained some weight.  Denying orthopnea or PND though.  No lower extremity swelling.    11/12/2020:  Exertional shortness of breath continues to stay the same, had mild improvement with adding Lasix.  She had an episode of chest pain couple of weeks ago, it felt like heaviness in character, it was localized to the substernal area lasted for 20 minutes and then went away on its own.  Did not have any further episodes of chest pain.    12/04/2020:  Patient underwent CTA since last visit which showed obstructive disease in LAD.  Patient was scheduled for a cardiac cath however her creatinine was elevated and she had SADIE.  She was given IV fluid hydration, adjustments were made in her antihypertensives.  She is here for routine follow-up.  Creatinine has improved.    No further chest pains.      Review of Systems - ROS  Constitution: Negative for weakness, weight gain and weight loss.   HENT: Negative for congestion.    Eyes: Negative for blurred vision.   Cardiovascular: As mentioned above  Respiratory: Negative for cough and hemoptysis.    Endocrine: Negative for polydipsia and polyuria.   Hematologic/Lymphatic: Negative for bleeding problem. Does not bruise/bleed easily.   Skin: Negative for flushing.   Musculoskeletal: Negative for neck pain and stiffness.   Gastrointestinal: Negative for abdominal pain, diarrhea, jaundice, melena, nausea and vomiting.   Genitourinary: Negative for dysuria and hematuria.   Neurological: Negative for dizziness, focal weakness and numbness.   Psychiatric/Behavioral: Negative for altered mental status and depression.          All other systems were reviewed and were negative.    family history includes Heart disease in her father and mother.     reports that she has never smoked. She has never used smokeless tobacco. She reports that she does not drink alcohol or use drugs.    Allergies   Allergen Reactions   •  "Hydroxyzine Headache     Caused migraine headache   • Ampicillin Rash   • Milk-Related Compounds GI Intolerance   • Penicillins Rash   • Pravastatin Mental Status Change     hyperactivity   • Sulfa Antibiotics Hives         Current Outpatient Medications:   •  amLODIPine (NORVASC) 10 MG tablet, Take 1 tablet by mouth Daily., Disp: 90 tablet, Rfl: 3  •  aspirin 81 MG EC tablet, Take 81 mg by mouth Daily., Disp: , Rfl:   •  atorvastatin (LIPITOR) 40 MG tablet, Take 40 mg by mouth Daily., Disp: , Rfl:   •  dilTIAZem CD (CARDIZEM CD) 120 MG 24 hr capsule, Take 1 capsule by mouth Daily., Disp: 30 capsule, Rfl: 11  •  isosorbide mononitrate (IMDUR) 120 MG 24 hr tablet, Take 1 tablet by mouth Daily., Disp: 30 tablet, Rfl: 3  •  nitroglycerin (NITROSTAT) 0.4 MG SL tablet, Place 0.4 mg under the tongue Every 5 (Five) Minutes As Needed. do not exceed a total of 3 doses in 15 minutes, Disp: , Rfl:   •  sotalol (BETAPACE) 80 MG tablet, Take 1 tablet by mouth 2 (Two) Times a Day., Disp: 60 tablet, Rfl: 3  •  spironolactone-hydrochlorothiazide (ALDACTAZIDE) 25-25 MG tablet, Take 1 tablet by mouth Daily., Disp: 90 tablet, Rfl: 3    Physical Exam:  Vitals:    12/04/20 1038   BP: 108/58   Pulse: 72   SpO2: 98%   Weight: 83.9 kg (185 lb)   Height: 162.6 cm (64\")   PainSc: 0-No pain     Current Pain Level: none  Pulse Ox: Normal  on room air  General: alert, appears stated age and cooperative     Body Habitus: well-nourished    HEENT: Head: Normocephalic, no lesions, without obvious abnormality. No arcus senilis, xanthelasma or xanthomas.    Neuro: alert, oriented x3  Pulses: 2+ and symmetric  JVP: Volume/Pulsation: Normal.  Normal waveforms.   Appropriate inspiratory decrease.  No Kussmaul's. No Gerardo's.   Carotid Exam: no bruit normal pulsation bilaterally   Carotid Volume: normal.     Respirations: no increased work of breathing   Chest:  Normal    Pulmonary:Normal   Precordium: Normal impulses. P2 is not palpable.  RV Heave: " absent  LV Heave: absent  [place]:  normal size and placement  Palpable S4: absent.  Heart rate: normal    Heart Rhythm: regular     Heart Sounds: S1: normal  S2: normal  S3: absent   S4: absent  Opening Snap: absent   Diastolic murmur audible in aortic area.   Pericardial Rub:  Absent: .    Abdomen:   Appearance: normal .  Palpation: Soft, non-tender to palpation, bowel sounds positive in all four quadrants; no guarding or rebound tenderness  Extremity: no edema.   LE Skin: no rashes  LE Hair:  normal  LE Pulses: well perfused with normal pulses in the distal extremities  Pallor on elevation: Absent. Rubor on dependency: None      DATA REVIEWED:     EKG. I personally reviewed and interpreted the EKG.  Atrial paced ventricular sensing    ECG/EMG Results (all)     None        ---------------------------------------------------  TTE/GUSTAVO:  Results for orders placed during the hospital encounter of 10/12/20   Adult Transthoracic Echo Complete W/ Cont if Necessary Per Protocol    Narrative · Aneurysmal dilation of the proximal aorta is present. There is an   unknown type of proximal aorta graft noted. Ascending aorta measures 4.5cm   in diameter (Unchangde from before). Linear echogenic density noted in the   ascending aorta which appears unchanged from prior echo. In the setting of   previous ascending aortic aneurysm repair most likely related to   post-operative changes. Color doppler did not reveal any flow into the   space beyond the echogenic density.  · Left ventricular ejection fraction appears to be 51 - 55%. Left   ventricular systolic function is normal.  · The left atrial cavity is moderately dilated.  · Aneurysmal dilation of the proximal aorta is present.  · Moderate aortic valve regurgitation is present.  · Mild to moderate mitral valve regurgitation is present.  · Estimated right ventricular systolic pressure from tricuspid   regurgitation is normal (<35 mmHg).                     --------------------------------------------------------------------------------------------------  LABS:     The CVD Risk score (Pricila et al., 2008) failed to calculate for the following reasons:    The 2008 CVD risk score is only valid for ages 30 to 74    The patient has a prior MI, stroke, CHF, or peripheral vascular disease diagnosis         Lab Results   Component Value Date    GLUCOSE 112 (H) 12/04/2020    BUN 55 (H) 12/04/2020    CREATININE 1.36 (H) 12/04/2020    EGFRIFNONA 38 (L) 12/04/2020    BCR 40.4 (H) 12/04/2020    K 4.8 12/04/2020    CO2 25.0 12/04/2020    CALCIUM 9.5 12/04/2020    ALBUMIN 3.50 10/13/2020    AST 26 10/13/2020    ALT 16 10/13/2020     Lab Results   Component Value Date    WBC 7.45 12/01/2020    HGB 12.6 12/01/2020    HCT 37.6 12/01/2020    MCV 93.3 12/01/2020     12/01/2020     Lab Results   Component Value Date    CHOL 185 11/03/2020    CHLPL 143 06/17/2016    TRIG 87 11/03/2020    HDL 48 11/03/2020     (H) 11/03/2020     Lab Results   Component Value Date    TSH 1.600 10/13/2020     No results found for: CKTOTAL, CKMB, CKMBINDEX, TROPONINI, TROPONINT  Lab Results   Component Value Date    HGBA1C 5.8 (H) 06/17/2016     No results found for: DDIMER  Lab Results   Component Value Date    ALT 16 10/13/2020     Lab Results   Component Value Date    HGBA1C 5.8 (H) 06/17/2016     Lab Results   Component Value Date    CREATININE 1.36 (H) 12/04/2020     No results found for: IRON, TIBC, FERRITIN  Lab Results   Component Value Date    INR 0.95 12/01/2020    PROTIME 13.0 12/01/2020       Assessment/Plan     1. Status post ascending aortic aneurysm repair with no CAD in 2010,  doing well no chest pain or shortness of breath.  Diastolic murmur audible in aortic area.  Concerning findings on echo and CT as above.  Moderate aortic regurgitation.  She is following with thoracic surgery at [facility].  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  Per imaging with surgery aneurysm has remained stable in size over the last 1 year.  Due to the risks of reoperative surgery she is currently being monitored closely.  Optimal blood pressure control is recommended.  She has been scheduled for repeat CTA in a year.    Last visit patient was complaining of worsening shortness of breath, so we repeated echo which was unchanged, aortic regurgitation is still moderate and LV fucntion and size are mary. Continue 40mg mg of Lasix     Plan:  Patient does have significant disease in her LAD which could have been causing shortness of breath and chest pain.  Discussed with the CT surgery at [facility], no plans for surgeries in the near future.  Surgery is okay with proceeding with PCI if needed.     2. Hypertension now:  Amlodipine 10mg,   Cardizem 120 mg  Imdur 120 mg  lisinopril 20 mg was stopped because of SADIE  Aldactazide 25/25  Sotalol 80mg BID    Blood pressure is better controlled now    3. Hyperlipidemia LDL>100, will increased lipitor to 40mgt.     4. sick sinus syndrome status post dual-chamber pacemaker, on sotalol for paroxysmal atrial fibrillation and remains in sinus rhythm.    5.  Paroxysmal A. fib  Previously diagnosed, however recent pacemaker check showed that she was in normal sinus rhythm.  Has been on sotalol for rhythm control.  CHADSVASC is 4.   She has not been on anticoagulation, since she has a pacemaker and there is reliable source of there is no recurrence we will continue to hold off anti-coagulation. Also in the setting of her aortic dilation, its ok to hold off AC.     6.  Coronary artery disease:  Significant disease noted in the LAD on CTA.  Scheduled for cardiac cath this week however creatinine was elevated.  Creatinine is coming down, will plan cardiac cath in a week.  Continue aspirin  Continue Imdur/Cardizem.  Advised her to come to the ER if you have significant chest  pain.      Prevention:  Patient's Body mass index is 31.76 kg/m². BMI is above normal parameters. Recommendations include: exercise counseling and nutrition counseling.      Abril Avilez is not a smoker    AAA Screening:   Not needed            This document has been electronically signed by Marquita Weinberg MD on December 4, 2020 10:58 CST

## 2020-12-07 DIAGNOSIS — R93.89 ABNORMAL COMPUTED TOMOGRAPHY ANGIOGRAPHY (CTA): ICD-10-CM

## 2020-12-07 DIAGNOSIS — R93.1 ELEVATED CORONARY ARTERY CALCIUM SCORE: ICD-10-CM

## 2020-12-07 DIAGNOSIS — R07.9 CHEST PAIN, UNSPECIFIED TYPE: Primary | ICD-10-CM

## 2020-12-07 DIAGNOSIS — R06.02 SHORTNESS OF BREATH: ICD-10-CM

## 2020-12-07 LAB
QT INTERVAL: 210 MS
QTC INTERVAL: 272 MS

## 2020-12-07 RX ORDER — SODIUM CHLORIDE 9 MG/ML
100 INJECTION, SOLUTION INTRAVENOUS CONTINUOUS
Status: CANCELLED | OUTPATIENT
Start: 2020-12-16

## 2020-12-07 RX ORDER — SODIUM CHLORIDE 0.9 % (FLUSH) 0.9 %
3 SYRINGE (ML) INJECTION EVERY 12 HOURS SCHEDULED
Status: CANCELLED | OUTPATIENT
Start: 2020-12-16

## 2020-12-07 RX ORDER — SODIUM CHLORIDE 0.9 % (FLUSH) 0.9 %
10 SYRINGE (ML) INJECTION AS NEEDED
Status: CANCELLED | OUTPATIENT
Start: 2020-12-16

## 2020-12-07 NOTE — PROGRESS NOTES
Left heart cath rescheduled for Wednesday December 16th. Patient aware and given all instructions including date time and instructions for pre op covid test

## 2020-12-13 ENCOUNTER — LAB (OUTPATIENT)
Dept: LAB | Facility: HOSPITAL | Age: 75
End: 2020-12-13

## 2020-12-13 DIAGNOSIS — Z01.818 PREOP TESTING: Primary | ICD-10-CM

## 2020-12-13 LAB — SARS-COV-2 N GENE RESP QL NAA+PROBE: NOT DETECTED

## 2020-12-13 PROCEDURE — 87635 SARS-COV-2 COVID-19 AMP PRB: CPT

## 2020-12-13 PROCEDURE — C9803 HOPD COVID-19 SPEC COLLECT: HCPCS

## 2020-12-14 ENCOUNTER — LAB (OUTPATIENT)
Dept: LAB | Facility: HOSPITAL | Age: 75
End: 2020-12-14

## 2020-12-14 DIAGNOSIS — I10 ESSENTIAL HYPERTENSION: ICD-10-CM

## 2020-12-14 LAB
ANION GAP SERPL CALCULATED.3IONS-SCNC: 9.4 MMOL/L (ref 5–15)
BUN SERPL-MCNC: 22 MG/DL (ref 8–23)
BUN/CREAT SERPL: 23.2 (ref 7–25)
CALCIUM SPEC-SCNC: 9.5 MG/DL (ref 8.6–10.5)
CHLORIDE SERPL-SCNC: 102 MMOL/L (ref 98–107)
CO2 SERPL-SCNC: 25.6 MMOL/L (ref 22–29)
CREAT SERPL-MCNC: 0.95 MG/DL (ref 0.57–1)
GFR SERPL CREATININE-BSD FRML MDRD: 57 ML/MIN/1.73
GLUCOSE SERPL-MCNC: 102 MG/DL (ref 65–99)
POTASSIUM SERPL-SCNC: 4.6 MMOL/L (ref 3.5–5.2)
SODIUM SERPL-SCNC: 137 MMOL/L (ref 136–145)

## 2020-12-14 PROCEDURE — 80048 BASIC METABOLIC PNL TOTAL CA: CPT

## 2020-12-14 PROCEDURE — 36415 COLL VENOUS BLD VENIPUNCTURE: CPT

## 2020-12-15 DIAGNOSIS — N17.9 AKI (ACUTE KIDNEY INJURY) (HCC): Primary | ICD-10-CM

## 2020-12-15 RX ORDER — SODIUM CHLORIDE 9 MG/ML
150 INJECTION, SOLUTION INTRAVENOUS CONTINUOUS
Status: CANCELLED | OUTPATIENT
Start: 2020-12-16 | End: 2020-12-16

## 2020-12-16 ENCOUNTER — HOSPITAL ENCOUNTER (OUTPATIENT)
Facility: HOSPITAL | Age: 75
Setting detail: HOSPITAL OUTPATIENT SURGERY
Discharge: HOME | End: 2020-12-16
Attending: INTERNAL MEDICINE | Admitting: INTERNAL MEDICINE

## 2020-12-16 VITALS
TEMPERATURE: 97.5 F | SYSTOLIC BLOOD PRESSURE: 117 MMHG | OXYGEN SATURATION: 96 % | HEART RATE: 61 BPM | DIASTOLIC BLOOD PRESSURE: 60 MMHG | RESPIRATION RATE: 18 BRPM

## 2020-12-16 DIAGNOSIS — R93.89 ABNORMAL COMPUTED TOMOGRAPHY ANGIOGRAPHY (CTA): ICD-10-CM

## 2020-12-16 DIAGNOSIS — R07.9 CHEST PAIN, UNSPECIFIED TYPE: ICD-10-CM

## 2020-12-16 DIAGNOSIS — R06.02 SHORTNESS OF BREATH: ICD-10-CM

## 2020-12-16 DIAGNOSIS — R93.1 ELEVATED CORONARY ARTERY CALCIUM SCORE: ICD-10-CM

## 2020-12-16 DIAGNOSIS — N17.9 AKI (ACUTE KIDNEY INJURY) (HCC): ICD-10-CM

## 2020-12-16 LAB
ANION GAP SERPL CALCULATED.3IONS-SCNC: 11 MMOL/L (ref 5–15)
BUN SERPL-MCNC: 26 MG/DL (ref 8–23)
BUN/CREAT SERPL: 28 (ref 7–25)
CALCIUM SPEC-SCNC: 9.3 MG/DL (ref 8.6–10.5)
CHLORIDE SERPL-SCNC: 105 MMOL/L (ref 98–107)
CO2 SERPL-SCNC: 24 MMOL/L (ref 22–29)
CREAT SERPL-MCNC: 0.93 MG/DL (ref 0.57–1)
DEPRECATED RDW RBC AUTO: 41.4 FL (ref 37–54)
ERYTHROCYTE [DISTWIDTH] IN BLOOD BY AUTOMATED COUNT: 12.6 % (ref 12.3–15.4)
GFR SERPL CREATININE-BSD FRML MDRD: 59 ML/MIN/1.73
GLUCOSE SERPL-MCNC: 108 MG/DL (ref 65–99)
HCT VFR BLD AUTO: 33.6 % (ref 34–46.6)
HGB BLD-MCNC: 11.7 G/DL (ref 12–15.9)
INR PPP: 0.98 (ref 0.8–1.2)
MCH RBC QN AUTO: 31.8 PG (ref 26.6–33)
MCHC RBC AUTO-ENTMCNC: 34.8 G/DL (ref 31.5–35.7)
MCV RBC AUTO: 91.3 FL (ref 79–97)
PLATELET # BLD AUTO: 194 10*3/MM3 (ref 140–450)
PMV BLD AUTO: 10.1 FL (ref 6–12)
POTASSIUM SERPL-SCNC: 4.1 MMOL/L (ref 3.5–5.2)
PROTHROMBIN TIME: 13.4 SECONDS (ref 11.1–15.3)
RBC # BLD AUTO: 3.68 10*6/MM3 (ref 3.77–5.28)
SODIUM SERPL-SCNC: 140 MMOL/L (ref 136–145)
WBC # BLD AUTO: 7.18 10*3/MM3 (ref 3.4–10.8)

## 2020-12-16 PROCEDURE — 0 IOPAMIDOL PER 1 ML: Performed by: INTERNAL MEDICINE

## 2020-12-16 PROCEDURE — 93458 L HRT ARTERY/VENTRICLE ANGIO: CPT | Performed by: INTERNAL MEDICINE

## 2020-12-16 PROCEDURE — 85610 PROTHROMBIN TIME: CPT | Performed by: INTERNAL MEDICINE

## 2020-12-16 PROCEDURE — 25010000002 MIDAZOLAM PER 1 MG: Performed by: INTERNAL MEDICINE

## 2020-12-16 PROCEDURE — 85027 COMPLETE CBC AUTOMATED: CPT | Performed by: INTERNAL MEDICINE

## 2020-12-16 PROCEDURE — 80048 BASIC METABOLIC PNL TOTAL CA: CPT | Performed by: INTERNAL MEDICINE

## 2020-12-16 PROCEDURE — C1894 INTRO/SHEATH, NON-LASER: HCPCS | Performed by: INTERNAL MEDICINE

## 2020-12-16 PROCEDURE — C1760 CLOSURE DEV, VASC: HCPCS | Performed by: INTERNAL MEDICINE

## 2020-12-16 PROCEDURE — 25010000002 HEPARIN (PORCINE) PER 1000 UNITS: Performed by: INTERNAL MEDICINE

## 2020-12-16 PROCEDURE — C1769 GUIDE WIRE: HCPCS | Performed by: INTERNAL MEDICINE

## 2020-12-16 PROCEDURE — 25010000002 FENTANYL CITRATE (PF) 100 MCG/2ML SOLUTION: Performed by: INTERNAL MEDICINE

## 2020-12-16 RX ORDER — HEPARIN SODIUM 1000 [USP'U]/ML
INJECTION, SOLUTION INTRAVENOUS; SUBCUTANEOUS AS NEEDED
Status: DISCONTINUED | OUTPATIENT
Start: 2020-12-16 | End: 2020-12-16 | Stop reason: HOSPADM

## 2020-12-16 RX ORDER — FENTANYL CITRATE 50 UG/ML
INJECTION, SOLUTION INTRAMUSCULAR; INTRAVENOUS AS NEEDED
Status: DISCONTINUED | OUTPATIENT
Start: 2020-12-16 | End: 2020-12-16 | Stop reason: HOSPADM

## 2020-12-16 RX ORDER — SODIUM CHLORIDE 9 MG/ML
100 INJECTION, SOLUTION INTRAVENOUS CONTINUOUS
Status: DISCONTINUED | OUTPATIENT
Start: 2020-12-16 | End: 2020-12-16 | Stop reason: HOSPADM

## 2020-12-16 RX ORDER — SODIUM CHLORIDE 0.9 % (FLUSH) 0.9 %
10 SYRINGE (ML) INJECTION AS NEEDED
Status: DISCONTINUED | OUTPATIENT
Start: 2020-12-16 | End: 2020-12-16 | Stop reason: HOSPADM

## 2020-12-16 RX ORDER — LIDOCAINE HYDROCHLORIDE 20 MG/ML
INJECTION, SOLUTION INFILTRATION; PERINEURAL AS NEEDED
Status: DISCONTINUED | OUTPATIENT
Start: 2020-12-16 | End: 2020-12-16 | Stop reason: HOSPADM

## 2020-12-16 RX ORDER — SODIUM CHLORIDE 0.9 % (FLUSH) 0.9 %
3 SYRINGE (ML) INJECTION EVERY 12 HOURS SCHEDULED
Status: DISCONTINUED | OUTPATIENT
Start: 2020-12-16 | End: 2020-12-16 | Stop reason: HOSPADM

## 2020-12-16 RX ORDER — MIDAZOLAM HYDROCHLORIDE 1 MG/ML
INJECTION INTRAMUSCULAR; INTRAVENOUS AS NEEDED
Status: DISCONTINUED | OUTPATIENT
Start: 2020-12-16 | End: 2020-12-16 | Stop reason: HOSPADM

## 2020-12-16 RX ORDER — SODIUM CHLORIDE 9 MG/ML
150 INJECTION, SOLUTION INTRAVENOUS CONTINUOUS
Status: ACTIVE | OUTPATIENT
Start: 2020-12-16 | End: 2020-12-16

## 2020-12-16 RX ADMIN — SODIUM CHLORIDE 150 ML/HR: 9 INJECTION, SOLUTION INTRAVENOUS at 05:51

## 2020-12-16 RX ADMIN — SODIUM CHLORIDE 100 ML/HR: 900 INJECTION, SOLUTION INTRAVENOUS at 14:15

## 2020-12-16 NOTE — INTERVAL H&P NOTE
[facility] Pre-Op:    Invasive coronary angiography was recommended to the patient.  The patientdenies  bleeding issues. The patient reports use of antiplatelet agents.  The patient reports CKD. The patient denies  contrast allergy. The patient denies  use of diabetes medications.    Pre-Cath Surgical History: Aortic aurysm repair    The indications, risks/benefits and alternatives of diagnostic left heart cardiac catheterization, angiography, conscious sedation, and possible blood transfusion were discussed in detail with the patient. The potential complications of 1/2000 chance of death, 1/1000 chance of heart attack or stroke, 1/500 chance of bleeding or clotting of the femoral artery, and 1/500 chance of allergic reaction to contrast were discussed. We also reviewed possible complications of infection and kidney dysfunction. If PCI were performed and intra-coronary stents indicated, we discussed the details about NESSA. This included a review of the risks of the infrequent, but relatively higher incidence of late thrombosis with NESSA. The importance of maintaining a consistent daily regimen of aspirin and an additional anti-platelet agent for as long as directed after implantation was emphasized. No contraindications were found. The patient  appeared to understand and agreed to the above.  -Left heart catheterization, Coronary angiography, Graft angiography, In-situ LIMA angiography, Left ventriculography, Intravascular ultrasound, Optical coherence tomography, Flow wire, Balloon Angioplasty, Coronary stent, Graft stent, Aortography, Iliofemoral angiography, Device closure, femoral artery, Intra-aortic balloon pump, Impella LV assist device implantation, Transvenous pacemaker, Pericardiocentesis and Subclavian angiography      ASA Class: III  Mallampati Score: II    Recommended contrast: 118  Maximum contrast: 177    Contraindications to DAPT: None      H&P reviewed. The patient was examined and there are no changes to  the H&P.

## 2020-12-31 ENCOUNTER — OFFICE VISIT (OUTPATIENT)
Dept: CARDIOLOGY | Facility: CLINIC | Age: 75
End: 2020-12-31

## 2020-12-31 VITALS
HEART RATE: 108 BPM | WEIGHT: 185.2 LBS | BODY MASS INDEX: 31.62 KG/M2 | OXYGEN SATURATION: 98 % | SYSTOLIC BLOOD PRESSURE: 150 MMHG | DIASTOLIC BLOOD PRESSURE: 68 MMHG | HEIGHT: 64 IN

## 2020-12-31 DIAGNOSIS — I48.0 PAROXYSMAL ATRIAL FIBRILLATION (HCC): Primary | ICD-10-CM

## 2020-12-31 DIAGNOSIS — R06.02 SHORTNESS OF BREATH: Primary | ICD-10-CM

## 2020-12-31 PROCEDURE — 99214 OFFICE O/P EST MOD 30 MIN: CPT | Performed by: INTERNAL MEDICINE

## 2020-12-31 PROCEDURE — 93000 ELECTROCARDIOGRAM COMPLETE: CPT | Performed by: INTERNAL MEDICINE

## 2020-12-31 RX ORDER — LISINOPRIL 5 MG/1
5 TABLET ORAL DAILY
Qty: 30 TABLET | Refills: 11 | Status: SHIPPED | OUTPATIENT
Start: 2020-12-31 | End: 2021-06-03 | Stop reason: SDUPTHER

## 2020-12-31 NOTE — PROGRESS NOTES
[facility] Cardiology  OFFICE NOTE    Cardiovascular Medicine  Marquita Weinberg M.D., RPVI         No referring provider defined for this encounter.    Thank you for asking me to see Abril Avilez for f/up.    Atrial Fibrillation  Past medical history includes atrial fibrillation.     This is a 75 y.o. female with:  1. SSS (sick sinus syndrome) (CMS/[facility])    2. Essential hypertension    3. Pure hypercholesterolemia    4. Paroxysmal atrial fibrillation (CMS/HCC)          Chief complaint -Follow-up atrial fibrillation        History of present Illness- 75-year-old lady with history of ascending aortic aneurysm repair in 2010, doing reasonably well .    After last visit patient presented for scheduled out patient echo today to assess her known aortic regurgitation and previous history of aortic repairt.  Possible dissection noted in aorta on echo. Patient sent for CTA of chest. Was noted to have possible dissection and intramural thrombus. I consulted Dr. Cotter in the setting of her previous aortic repair. Dr. Cotter reviewed the films and discussed with Dr. Mack at [facility]. Patient was asymptomatic, per Dr. Cotter's recommendations patient was stable for discharged and to f/up with Dr. Mack, patient has been seen and has been plan for potential second repair and valve replacement.  Patient is currently denying any chest pain or shortness of breath.  she is to report to ER if any sudden chest, back, or shoulder pain of any sudden onset of SOB.     08/28/2020:  No acute issues since last visit.  Denying any chest pain or shortness of breath.  Denying palpitations.    10/08/2020:  Has been closing following with Dr. Gomez at [facility] for her ascending aortic aneurysm.  Was recently seen, noted to have elevated blood pressure and and worsening shortness of breath, here for further evaluation.  Patient did endorse that after exertion her blood pressure has been elevated at home is within the 150s 160s,  she has gained some weight.  Denying orthopnea or PND though.  No lower extremity swelling.    11/12/2020:  Exertional shortness of breath continues to stay the same, had mild improvement with adding Lasix.  She had an episode of chest pain couple of weeks ago, it felt like heaviness in character, it was localized to the substernal area lasted for 20 minutes and then went away on its own.  Did not have any further episodes of chest pain.    12/04/2020:  Patient underwent CTA since last visit which showed obstructive disease in LAD.  Patient was scheduled for a cardiac cath however her creatinine was elevated and she had SADIE.  She was given IV fluid hydration, adjustments were made in her antihypertensives.  She is here for routine follow-up.  Creatinine has improved.  No further chest pains.    12/31/2020:  Patient underwent cardiac cath since last visit since the CT was abnormal however there was no evidence of obstructive coronary artery disease.  Filling pressures were normal.  Had a mechanical fall last week causing injury to her right knee.  Continues to have significant shortness of breath on exertion    Review of Systems - ROS  Constitution: Negative for weakness, weight gain and weight loss.   HENT: Negative for congestion.    Eyes: Negative for blurred vision.   Cardiovascular: As mentioned above  Respiratory: Negative for cough and hemoptysis.    Endocrine: Negative for polydipsia and polyuria.   Hematologic/Lymphatic: Negative for bleeding problem. Does not bruise/bleed easily.   Skin: Negative for flushing.   Musculoskeletal: Negative for neck pain and stiffness.   Gastrointestinal: Negative for abdominal pain, diarrhea, jaundice, melena, nausea and vomiting.   Genitourinary: Negative for dysuria and hematuria.   Neurological: Negative for dizziness, focal weakness and numbness.   Psychiatric/Behavioral: Negative for altered mental status and depression.          All other systems were reviewed and were  "negative.    family history includes Heart disease in her father and mother.     reports that she has never smoked. She has never used smokeless tobacco. She reports that she does not drink alcohol or use drugs.    Allergies   Allergen Reactions   • Hydroxyzine Headache     Caused migraine headache   • Ampicillin Rash   • Milk-Related Compounds GI Intolerance   • Penicillins Rash   • Pravastatin Mental Status Change     hyperactivity   • Sulfa Antibiotics Hives         Current Outpatient Medications:   •  amLODIPine (NORVASC) 10 MG tablet, Take 1 tablet by mouth Daily., Disp: 90 tablet, Rfl: 3  •  aspirin 81 MG EC tablet, Take 81 mg by mouth Daily., Disp: , Rfl:   •  atorvastatin (LIPITOR) 40 MG tablet, Take 40 mg by mouth Daily., Disp: , Rfl:   •  dilTIAZem CD (CARDIZEM CD) 120 MG 24 hr capsule, Take 1 capsule by mouth Daily., Disp: 30 capsule, Rfl: 11  •  isosorbide mononitrate (IMDUR) 120 MG 24 hr tablet, Take 1 tablet by mouth Daily., Disp: 30 tablet, Rfl: 3  •  nitroglycerin (NITROSTAT) 0.4 MG SL tablet, Place 0.4 mg under the tongue Every 5 (Five) Minutes As Needed. do not exceed a total of 3 doses in 15 minutes, Disp: , Rfl:   •  sotalol (BETAPACE) 80 MG tablet, Take 1 tablet by mouth 2 (Two) Times a Day., Disp: 60 tablet, Rfl: 3  •  spironolactone-hydrochlorothiazide (ALDACTAZIDE) 25-25 MG tablet, Take 1 tablet by mouth Daily., Disp: 90 tablet, Rfl: 3    Physical Exam:  Vitals:    12/31/20 1146   BP: 150/68   Pulse: 62   SpO2: 98%   Weight: 84 kg (185 lb 3.2 oz)   Height: 162.6 cm (64\")       Current Pain Level: none  Pulse Ox: Normal  on room air  General: alert, appears stated age and cooperative     Body Habitus: well-nourished    HEENT: Head: Normocephalic, no lesions, without obvious abnormality. No arcus senilis, xanthelasma or xanthomas.    Neuro: alert, oriented x3  Pulses: 2+ and symmetric  JVP: Volume/Pulsation: Normal.  Normal waveforms.   Appropriate inspiratory decrease.  No Kussmaul's. No " Gerardo's.   Carotid Exam: no bruit normal pulsation bilaterally   Carotid Volume: normal.     Respirations: no increased work of breathing   Chest:  Normal    Pulmonary:Normal   Precordium: Normal impulses. P2 is not palpable.  RV Heave: absent  LV Heave: absent  [place]:  normal size and placement  Palpable S4: absent.  Heart rate: normal    Heart Rhythm: regular     Heart Sounds: S1: normal  S2: normal  S3: absent   S4: absent  Opening Snap: absent   Diastolic murmur audible in aortic area.   Pericardial Rub:  Absent: .    Abdomen:   Appearance: normal .  Palpation: Soft, non-tender to palpation, bowel sounds positive in all four quadrants; no guarding or rebound tenderness  Extremity: no edema.   LE Skin: no rashes  LE Hair:  normal  LE Pulses: well perfused with normal pulses in the distal extremities  Pallor on elevation: Absent. Rubor on dependency: None      DATA REVIEWED:     EKG. I personally reviewed and interpreted the EKG.  Atrial paced ventricular sensing    ECG/EMG Results (all)     None        ---------------------------------------------------  TTE/GUSTAVO:  Results for orders placed during the hospital encounter of 10/12/20   Adult Transthoracic Echo Complete W/ Cont if Necessary Per Protocol    Narrative · Aneurysmal dilation of the proximal aorta is present. There is an   unknown type of proximal aorta graft noted. Ascending aorta measures 4.5cm   in diameter (Unchangde from before). Linear echogenic density noted in the   ascending aorta which appears unchanged from prior echo. In the setting of   previous ascending aortic aneurysm repair most likely related to   post-operative changes. Color doppler did not reveal any flow into the   space beyond the echogenic density.  · Left ventricular ejection fraction appears to be 51 - 55%. Left   ventricular systolic function is normal.  · The left atrial cavity is moderately dilated.  · Aneurysmal dilation of the proximal aorta is present.  · Moderate aortic  valve regurgitation is present.  · Mild to moderate mitral valve regurgitation is present.  · Estimated right ventricular systolic pressure from tricuspid   regurgitation is normal (<35 mmHg).            --------------------------------------------------------------------------------------------------  LABS:     The CVD Risk score (Pricila et al., 2008) failed to calculate for the following reasons:    The 2008 CVD risk score is only valid for ages 30 to 74    The patient has a prior MI, stroke, CHF, or peripheral vascular disease diagnosis         Lab Results   Component Value Date    GLUCOSE 108 (H) 12/16/2020    BUN 26 (H) 12/16/2020    CREATININE 0.93 12/16/2020    EGFRIFNONA 59 (L) 12/16/2020    BCR 28.0 (H) 12/16/2020    K 4.1 12/16/2020    CO2 24.0 12/16/2020    CALCIUM 9.3 12/16/2020    ALBUMIN 3.50 10/13/2020    AST 26 10/13/2020    ALT 16 10/13/2020     Lab Results   Component Value Date    WBC 7.18 12/16/2020    HGB 11.7 (L) 12/16/2020    HCT 33.6 (L) 12/16/2020    MCV 91.3 12/16/2020     12/16/2020     Lab Results   Component Value Date    CHOL 185 11/03/2020    CHLPL 143 06/17/2016    TRIG 87 11/03/2020    HDL 48 11/03/2020     (H) 11/03/2020     Lab Results   Component Value Date    TSH 1.600 10/13/2020     No results found for: CKTOTAL, CKMB, CKMBINDEX, TROPONINI, TROPONINT  Lab Results   Component Value Date    HGBA1C 5.8 (H) 06/17/2016     No results found for: DDIMER  Lab Results   Component Value Date    ALT 16 10/13/2020     Lab Results   Component Value Date    HGBA1C 5.8 (H) 06/17/2016     Lab Results   Component Value Date    CREATININE 0.93 12/16/2020     No results found for: IRON, TIBC, FERRITIN  Lab Results   Component Value Date    INR 0.98 12/16/2020    INR 0.95 12/01/2020    PROTIME 13.4 12/16/2020    PROTIME 13.0 12/01/2020       Assessment/Plan     1. Status post ascending aortic aneurysm repair with no CAD in 2010,  doing well no chest pain or shortness of  breath.  Diastolic murmur audible in aortic area.  Concerning findings on echo and CT as above.  Moderate aortic regurgitation.  She is following with thoracic surgery at [place].  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  Per imaging with surgery aneurysm has remained stable in size over the last 1 year.  Due to the risks of reoperative surgery she is currently being monitored closely.  Optimal blood pressure control is recommended.  She has been scheduled for repeat CTA in a year.    Last visit patient was complaining of worsening shortness of breath, so we repeated echo which was unchanged, aortic regurgitation is still moderate and LV fucntion and size are mary. Continue 40mg mg of Lasix   Cardiac cath was without any evidence of obstructive coronary artery disease.       2. Hypertension:  Amlodipine 10mg,   Cardizem 120 mg  Imdur 120 mg  lisinopril 20 mg was stopped because of SADIE  Aldactazide 25/25  Sotalol 80mg BID    Blood pressure is elevated in office today.  Will restart lisinopril at 5 mg and check BMP in a week.    3. Hyperlipidemia LDL>100, will increased lipitor to 40mgt.     4. sick sinus syndrome status post dual-chamber pacemaker, on sotalol for paroxysmal atrial fibrillation and remains in sinus rhythm.    5.  Paroxysmal A. fib  Previously diagnosed, however recent pacemaker check showed that she was in normal sinus rhythm.  Has been on sotalol for rhythm control.  CHADSVASC is 4.   She has not been on anticoagulation, since she has a pacemaker and there is reliable source of there is no recurrence we will continue to hold off anti-coagulation. Also in the setting of her aortic dilation, its ok to hold off AC.     6.  Coronary artery disease:  Cardiac cath with mild nonobstructive disease and congestive heart failure preserved ejection fraction:  Optimal blood pressure control as above.    7.  Shortness of  breath:  Multifactorial from aortic regurgitation and congestive heart failure preserved ejection fraction:  Optimal blood pressure control as above.  We will send her back to surgery for consideration for arctic valve replacement.      Prevention:  Patient's Body mass index is 31.79 kg/m². BMI is above normal parameters. Recommendations include: exercise counseling and nutrition counseling.      Abril Avilez is not a smoker    AAA Screening:   Not needed            This document has been electronically signed by Marquita Weinberg MD on December 31, 2020 11:51 CST

## 2021-01-02 ENCOUNTER — APPOINTMENT (OUTPATIENT)
Dept: CT IMAGING | Facility: HOSPITAL | Age: 76
End: 2021-01-02

## 2021-01-02 ENCOUNTER — HOSPITAL ENCOUNTER (EMERGENCY)
Facility: HOSPITAL | Age: 76
Discharge: HOME | End: 2021-01-02
Attending: EMERGENCY MEDICINE | Admitting: EMERGENCY MEDICINE

## 2021-01-02 VITALS
WEIGHT: 186 LBS | DIASTOLIC BLOOD PRESSURE: 65 MMHG | HEART RATE: 62 BPM | SYSTOLIC BLOOD PRESSURE: 148 MMHG | TEMPERATURE: 98 F | OXYGEN SATURATION: 98 % | HEIGHT: 64 IN | RESPIRATION RATE: 20 BRPM | BODY MASS INDEX: 31.76 KG/M2

## 2021-01-02 DIAGNOSIS — S16.1XXA STRAIN OF NECK MUSCLE, INITIAL ENCOUNTER: Primary | ICD-10-CM

## 2021-01-02 DIAGNOSIS — I71.20 THORACIC AORTIC ANEURYSM WITHOUT RUPTURE (HCC): ICD-10-CM

## 2021-01-02 LAB
ALBUMIN SERPL-MCNC: 4.2 G/DL (ref 3.5–5.2)
ALBUMIN/GLOB SERPL: 1.2 G/DL
ALP SERPL-CCNC: 91 U/L (ref 39–117)
ALT SERPL W P-5'-P-CCNC: 11 U/L (ref 1–33)
ANION GAP SERPL CALCULATED.3IONS-SCNC: 11 MMOL/L (ref 5–15)
AST SERPL-CCNC: 14 U/L (ref 1–32)
BASOPHILS # BLD AUTO: 0.03 10*3/MM3 (ref 0–0.2)
BASOPHILS NFR BLD AUTO: 0.3 % (ref 0–1.5)
BILIRUB SERPL-MCNC: 1.2 MG/DL (ref 0–1.2)
BUN SERPL-MCNC: 38 MG/DL (ref 8–23)
BUN/CREAT SERPL: 31.1 (ref 7–25)
CALCIUM SPEC-SCNC: 9.9 MG/DL (ref 8.6–10.5)
CHLORIDE SERPL-SCNC: 105 MMOL/L (ref 98–107)
CO2 SERPL-SCNC: 25 MMOL/L (ref 22–29)
CREAT SERPL-MCNC: 1.22 MG/DL (ref 0.57–1)
DEPRECATED RDW RBC AUTO: 43.7 FL (ref 37–54)
EOSINOPHIL # BLD AUTO: 0.06 10*3/MM3 (ref 0–0.4)
EOSINOPHIL NFR BLD AUTO: 0.7 % (ref 0.3–6.2)
ERYTHROCYTE [DISTWIDTH] IN BLOOD BY AUTOMATED COUNT: 12.7 % (ref 12.3–15.4)
GFR SERPL CREATININE-BSD FRML MDRD: 43 ML/MIN/1.73
GLOBULIN UR ELPH-MCNC: 3.4 GM/DL
GLUCOSE SERPL-MCNC: 90 MG/DL (ref 65–99)
HCT VFR BLD AUTO: 33.3 % (ref 34–46.6)
HGB BLD-MCNC: 11.4 G/DL (ref 12–15.9)
HOLD SPECIMEN: NORMAL
IMM GRANULOCYTES # BLD AUTO: 0.03 10*3/MM3 (ref 0–0.05)
IMM GRANULOCYTES NFR BLD AUTO: 0.3 % (ref 0–0.5)
LYMPHOCYTES # BLD AUTO: 1.51 10*3/MM3 (ref 0.7–3.1)
LYMPHOCYTES NFR BLD AUTO: 16.7 % (ref 19.6–45.3)
MCH RBC QN AUTO: 31.8 PG (ref 26.6–33)
MCHC RBC AUTO-ENTMCNC: 34.2 G/DL (ref 31.5–35.7)
MCV RBC AUTO: 93 FL (ref 79–97)
MONOCYTES # BLD AUTO: 0.57 10*3/MM3 (ref 0.1–0.9)
MONOCYTES NFR BLD AUTO: 6.3 % (ref 5–12)
NEUTROPHILS NFR BLD AUTO: 6.82 10*3/MM3 (ref 1.7–7)
NEUTROPHILS NFR BLD AUTO: 75.7 % (ref 42.7–76)
NRBC BLD AUTO-RTO: 0 /100 WBC (ref 0–0.2)
PLATELET # BLD AUTO: 243 10*3/MM3 (ref 140–450)
PMV BLD AUTO: 10.2 FL (ref 6–12)
POTASSIUM SERPL-SCNC: 3.8 MMOL/L (ref 3.5–5.2)
PROT SERPL-MCNC: 7.6 G/DL (ref 6–8.5)
RBC # BLD AUTO: 3.58 10*6/MM3 (ref 3.77–5.28)
SODIUM SERPL-SCNC: 141 MMOL/L (ref 136–145)
WBC # BLD AUTO: 9.02 10*3/MM3 (ref 3.4–10.8)
WHOLE BLOOD HOLD SPECIMEN: NORMAL

## 2021-01-02 PROCEDURE — 85025 COMPLETE CBC W/AUTO DIFF WBC: CPT | Performed by: STUDENT IN AN ORGANIZED HEALTH CARE EDUCATION/TRAINING PROGRAM

## 2021-01-02 PROCEDURE — 72125 CT NECK SPINE W/O DYE: CPT

## 2021-01-02 PROCEDURE — 80053 COMPREHEN METABOLIC PANEL: CPT | Performed by: STUDENT IN AN ORGANIZED HEALTH CARE EDUCATION/TRAINING PROGRAM

## 2021-01-02 PROCEDURE — 96374 THER/PROPH/DIAG INJ IV PUSH: CPT

## 2021-01-02 PROCEDURE — 99284 EMERGENCY DEPT VISIT MOD MDM: CPT

## 2021-01-02 PROCEDURE — 25010000002 MORPHINE PER 10 MG: Performed by: STUDENT IN AN ORGANIZED HEALTH CARE EDUCATION/TRAINING PROGRAM

## 2021-01-02 RX ORDER — MORPHINE SULFATE 2 MG/ML
2 INJECTION, SOLUTION INTRAMUSCULAR; INTRAVENOUS
Status: DISCONTINUED | OUTPATIENT
Start: 2021-01-02 | End: 2021-01-02 | Stop reason: HOSPADM

## 2021-01-02 RX ORDER — HYDROCODONE BITARTRATE AND ACETAMINOPHEN 5; 325 MG/1; MG/1
1 TABLET ORAL EVERY 6 HOURS PRN
Qty: 12 TABLET | Refills: 0 | Status: SHIPPED | OUTPATIENT
Start: 2021-01-02 | End: 2021-01-28

## 2021-01-02 RX ORDER — SODIUM CHLORIDE 0.9 % (FLUSH) 0.9 %
10 SYRINGE (ML) INJECTION AS NEEDED
Status: DISCONTINUED | OUTPATIENT
Start: 2021-01-02 | End: 2021-01-02 | Stop reason: HOSPADM

## 2021-01-02 RX ORDER — FUROSEMIDE 40 MG/1
40 TABLET ORAL 2 TIMES DAILY
COMMUNITY
End: 2021-01-28

## 2021-01-02 RX ADMIN — MORPHINE SULFATE 2 MG: 2 INJECTION, SOLUTION INTRAMUSCULAR; INTRAVENOUS at 13:17

## 2021-01-02 NOTE — ED PROVIDER NOTES
Subjective   Abril Avilez is 75 y.o. female with history of neck surgery >10 years ago. Fall happened last Saturday; tripped on rug; did not hit head or lose consciousness. Neck pain started abruptly yesterday evening without any inciting event. No changes in sensations or paresthesias. Patient denies any chest pain, palpitations or SOB.       History provided by:  Patient   used: No    Neck Pain  Pain location:  Occipital region, L side and R side  Quality:  Shooting, stabbing and burning  Pain radiates to:  R shoulder  Pain severity:  Severe  Onset quality:  Gradual  Duration:  24 hours  Timing:  Constant  Progression:  Worsening  Chronicity:  New  Context: fall    Relieved by:  Ice  Ineffective treatments:  Heat and NSAIDs  Associated symptoms: no chest pain, no headaches, no numbness and no visual change        Review of Systems   Constitutional: Negative for chills and fatigue.   HENT: Negative for congestion, sneezing and sore throat.    Eyes: Negative for redness and visual disturbance.   Respiratory: Negative for cough, shortness of breath and wheezing.    Cardiovascular: Negative for chest pain, palpitations and leg swelling.   Gastrointestinal: Negative for abdominal distention, abdominal pain, constipation, diarrhea and nausea.   Endocrine: Negative for polydipsia and polyuria.   Genitourinary: Negative for difficulty urinating and dysuria.   Musculoskeletal: Positive for myalgias, neck pain and neck stiffness.   Skin: Negative for pallor and rash.   Neurological: Negative for dizziness, numbness and headaches.   Psychiatric/Behavioral: Negative for agitation and behavioral problems.       Past Medical History:   Diagnosis Date   • Atrial fibrillation (CMS/HCC)    • Bradycardia    • CHF (congestive heart failure) (CMS/HCC)    • History of transfusion    • Hyperlipidemia    • Hypertension    • Nephropathy    • Shortness of breath        Allergies   Allergen Reactions   •  Hydroxyzine Headache     Caused migraine headache   • Ampicillin Rash   • Milk-Related Compounds GI Intolerance   • Penicillins Rash   • Pravastatin Mental Status Change     hyperactivity   • Sulfa Antibiotics Hives       Past Surgical History:   Procedure Laterality Date   • CARDIAC CATHETERIZATION N/A 12/16/2020    Procedure: CORONARY ANGIOGRAPHY;  Surgeon: Marquita Weinberg MD;  Location: [facility] CATH INVASIVE LOCATION;  Service: Cardiology;  Laterality: N/A;   • CARDIAC CATHETERIZATION N/A 12/16/2020    Procedure: PERCUTANEOUS CORONARY INTERVENTION;  Surgeon: Marquita Weinberg MD;  Location: [facility] CATH INVASIVE LOCATION;  Service: Cardiology;  Laterality: N/A;   • CARDIAC SURGERY  01/01/2007    AVR   • CATARACT EXTRACTION WITH INTRAOCULAR LENS IMPLANT Bilateral    • CHOLECYSTECTOMY     • COLONOSCOPY N/A 4/14/2017    Procedure: COLONOSCOPY;  Surgeon: Yuri Eaton DO;  Location: [facility] ENDOSCOPY;  Service:    • HYSTERECTOMY     • JOINT REPLACEMENT  01/01/2006   • KNEE SURGERY     • NECK SURGERY  2007   • PACEMAKER IMPLANTATION       Family History   Problem Relation Age of Onset   • Heart disease Mother    • Heart disease Father        Social History     Socioeconomic History   • Marital status:      Spouse name: Not on file   • Number of children: Not on file   • Years of education: Not on file   • Highest education level: Not on file   Tobacco Use   • Smoking status: Never Smoker   • Smokeless tobacco: Never Used   Substance and Sexual Activity   • Alcohol use: No   • Drug use: No   • Sexual activity: Defer     Objective   Physical Exam  Vitals signs reviewed.   Constitutional:       General: She is in acute distress.   Neck:      Musculoskeletal: Muscular tenderness present.      Comments: ROM limited   Cardiovascular:      Rate and Rhythm: Normal rate.      Pulses: Normal pulses.      Heart sounds: No murmur.   Pulmonary:      Effort: Pulmonary effort is normal.      Breath sounds: Normal breath sounds.    Abdominal:      General: Bowel sounds are normal.      Palpations: Abdomen is soft.      Tenderness: There is no abdominal tenderness.   Musculoskeletal:         General: No swelling.      Right lower leg: No edema.      Left lower leg: No edema.      Comments: Bony tenderness along C spine; paraspinal tenderness along R cervical region. ROM of cervical spine limited from pain.    Neurological:      General: No focal deficit present.      Mental Status: She is alert and oriented to person, place, and time.         Procedures         ED Course  ED Course as of Jan 02 1619   Sat Jan 02, 2021   1359 Reviewed CMP notable for GFR of 43. Review of patient's medical record shows recent SADIE from CTA coronary. CT radiology contacted and angiogram cancelled after discussing risks vs benefits w patient and family.     [AW]   1526 Discussed results of CT scan w patient and family. Discussed aortic aneurysm size increase when compared to prior imaging here and on outside records from [place].     [AW]   1615 Dicussed discharge home and importance of follow up with CTS, neurosurgery and PCP. Patient and family comfortable with plan to discharge.     [AW]      ED Course User Index  [AW] Tristen Varela MD                                           MDM  Number of Diagnoses or Management Options  Strain of neck muscle, initial encounter: new and requires workup  Thoracic aortic aneurysm without rupture (CMS/HCC): established and worsening  Diagnosis management comments: No evidence of acute osseous injury on CT scan. However given severity of pain and surgical history recommended f/u w neurosurgery. Patient follows with CTS at [place] for aortic aneurysm which showed increase in size when compared to prior imaging; discussed with patient who denies any cardiac symptoms and instructed f/u w CTS.        Amount and/or Complexity of Data Reviewed  Clinical lab tests: reviewed  Tests in the radiology section of CPT®:  reviewed  Discuss the patient with other providers: yes        Final diagnoses:   Strain of neck muscle, initial encounter   Thoracic aortic aneurysm without rupture (CMS/[facility])       This document has been electronically signed by Tristen Varela MD on January 2, 2021 16:19 CST           Tristen Varela MD  Resident  01/02/21 1531

## 2021-01-02 NOTE — DISCHARGE INSTRUCTIONS
Take pain medications as needed.  Avoid exertional activities, excessive movements of neck.  Follow-up with vascular and spinal surgery for reevaluation along with primary care and cardiology.  Return to ER for intractable pain, difficulty movements of neck, numbness tingling weakness of extremities.  Also return to ER for any chest pain palpitations or shortness of breath.

## 2021-01-04 RX ORDER — SOTALOL HYDROCHLORIDE 80 MG/1
TABLET ORAL
Qty: 180 TABLET | Refills: 0 | Status: SHIPPED | OUTPATIENT
Start: 2021-01-04 | End: 2021-01-28

## 2021-01-08 LAB
QT INTERVAL: 220 MS
QTC INTERVAL: 294 MS

## 2021-01-11 ENCOUNTER — LAB (OUTPATIENT)
Dept: LAB | Facility: HOSPITAL | Age: 76
End: 2021-01-11

## 2021-01-11 LAB
ALBUMIN SERPL-MCNC: 4.3 G/DL (ref 3.5–5.2)
ALBUMIN/GLOB SERPL: 1.7 G/DL
ALP SERPL-CCNC: 82 U/L (ref 39–117)
ALT SERPL W P-5'-P-CCNC: 18 U/L (ref 1–33)
ANION GAP SERPL CALCULATED.3IONS-SCNC: 10.2 MMOL/L (ref 5–15)
AST SERPL-CCNC: 20 U/L (ref 1–32)
BILIRUB SERPL-MCNC: 0.5 MG/DL (ref 0–1.2)
BUN SERPL-MCNC: 34 MG/DL (ref 8–23)
BUN/CREAT SERPL: 32.4 (ref 7–25)
CALCIUM SPEC-SCNC: 9 MG/DL (ref 8.6–10.5)
CHLORIDE SERPL-SCNC: 102 MMOL/L (ref 98–107)
CO2 SERPL-SCNC: 25.8 MMOL/L (ref 22–29)
CREAT SERPL-MCNC: 1.05 MG/DL (ref 0.57–1)
GFR SERPL CREATININE-BSD FRML MDRD: 51 ML/MIN/1.73
GLOBULIN UR ELPH-MCNC: 2.5 GM/DL
GLUCOSE SERPL-MCNC: 105 MG/DL (ref 65–99)
POTASSIUM SERPL-SCNC: 4.4 MMOL/L (ref 3.5–5.2)
PROT SERPL-MCNC: 6.8 G/DL (ref 6–8.5)
SODIUM SERPL-SCNC: 138 MMOL/L (ref 136–145)

## 2021-01-11 PROCEDURE — 36415 COLL VENOUS BLD VENIPUNCTURE: CPT | Performed by: INTERNAL MEDICINE

## 2021-01-11 PROCEDURE — 80053 COMPREHEN METABOLIC PANEL: CPT | Performed by: INTERNAL MEDICINE

## 2021-01-13 ENCOUNTER — DOCUMENTATION (OUTPATIENT)
Dept: CARDIOLOGY | Facility: CLINIC | Age: 76
End: 2021-01-13

## 2021-01-13 DIAGNOSIS — Z79.02 LONG TERM (CURRENT) USE OF ANTITHROMBOTICS/ANTIPLATELETS: ICD-10-CM

## 2021-01-13 DIAGNOSIS — Z79.01 LONG TERM (CURRENT) USE OF ANTICOAGULANTS: ICD-10-CM

## 2021-01-13 DIAGNOSIS — Z95.0 PRESENCE OF CARDIAC PACEMAKER: Primary | ICD-10-CM

## 2021-01-13 DIAGNOSIS — N17.9 AKI (ACUTE KIDNEY INJURY) (HCC): ICD-10-CM

## 2021-01-13 DIAGNOSIS — Z45.010 PACEMAKER GENERATOR END OF LIFE: ICD-10-CM

## 2021-01-13 RX ORDER — CLINDAMYCIN PHOSPHATE 900 MG/50ML
900 INJECTION INTRAVENOUS ONCE
Status: CANCELLED | OUTPATIENT
Start: 2021-02-04 | End: 2021-01-13

## 2021-01-13 RX ORDER — SODIUM CHLORIDE 9 MG/ML
100 INJECTION, SOLUTION INTRAVENOUS CONTINUOUS
Status: CANCELLED | OUTPATIENT
Start: 2021-02-04

## 2021-01-13 NOTE — PROGRESS NOTES
Gen change of pacemaker scheduled. Patient given date and instructions including date, time, and instructions for pre op covid test. Patient verbalized understanding

## 2021-01-25 RX ORDER — SPIRONOLACTONE AND HYDROCHLOROTHIAZIDE 25; 25 MG/1; MG/1
TABLET ORAL
Qty: 30 TABLET | Refills: 0 | Status: SHIPPED | OUTPATIENT
Start: 2021-01-25 | End: 2021-01-28

## 2021-01-28 RX ORDER — SPIRONOLACTONE AND HYDROCHLOROTHIAZIDE 25; 25 MG/1; MG/1
1 TABLET ORAL DAILY
COMMUNITY
End: 2021-02-22

## 2021-01-28 RX ORDER — SOTALOL HYDROCHLORIDE 80 MG/1
80 TABLET ORAL 2 TIMES DAILY
COMMUNITY
End: 2021-04-12

## 2021-02-01 ENCOUNTER — LAB (OUTPATIENT)
Dept: LAB | Facility: HOSPITAL | Age: 76
End: 2021-02-01

## 2021-02-01 DIAGNOSIS — Z01.818 PREOP TESTING: Primary | ICD-10-CM

## 2021-02-01 PROCEDURE — C9803 HOPD COVID-19 SPEC COLLECT: HCPCS

## 2021-02-01 PROCEDURE — U0004 COV-19 TEST NON-CDC HGH THRU: HCPCS

## 2021-02-02 LAB — SARS-COV-2 ORF1AB RESP QL NAA+PROBE: NOT DETECTED

## 2021-02-04 ENCOUNTER — HOSPITAL ENCOUNTER (OUTPATIENT)
Facility: HOSPITAL | Age: 76
Setting detail: HOSPITAL OUTPATIENT SURGERY
Discharge: HOME | End: 2021-02-04
Attending: INTERNAL MEDICINE | Admitting: INTERNAL MEDICINE

## 2021-02-04 VITALS
TEMPERATURE: 98.8 F | HEART RATE: 60 BPM | HEIGHT: 64 IN | BODY MASS INDEX: 31.88 KG/M2 | DIASTOLIC BLOOD PRESSURE: 56 MMHG | WEIGHT: 186.73 LBS | SYSTOLIC BLOOD PRESSURE: 102 MMHG | OXYGEN SATURATION: 96 % | RESPIRATION RATE: 18 BRPM

## 2021-02-04 DIAGNOSIS — Z45.010 PACEMAKER GENERATOR END OF LIFE: ICD-10-CM

## 2021-02-04 DIAGNOSIS — Z95.0 PRESENCE OF CARDIAC PACEMAKER: ICD-10-CM

## 2021-02-04 DIAGNOSIS — N17.9 AKI (ACUTE KIDNEY INJURY) (HCC): ICD-10-CM

## 2021-02-04 DIAGNOSIS — Z79.02 LONG TERM (CURRENT) USE OF ANTITHROMBOTICS/ANTIPLATELETS: ICD-10-CM

## 2021-02-04 LAB
ANION GAP SERPL CALCULATED.3IONS-SCNC: 10 MMOL/L (ref 5–15)
BASOPHILS # BLD AUTO: 0.04 10*3/MM3 (ref 0–0.2)
BASOPHILS NFR BLD AUTO: 0.5 % (ref 0–1.5)
BUN SERPL-MCNC: 22 MG/DL (ref 8–23)
BUN/CREAT SERPL: 20.6 (ref 7–25)
CALCIUM SPEC-SCNC: 9.6 MG/DL (ref 8.6–10.5)
CHLORIDE SERPL-SCNC: 104 MMOL/L (ref 98–107)
CO2 SERPL-SCNC: 27 MMOL/L (ref 22–29)
CREAT SERPL-MCNC: 1.07 MG/DL (ref 0.57–1)
DEPRECATED RDW RBC AUTO: 43.9 FL (ref 37–54)
EOSINOPHIL # BLD AUTO: 0.21 10*3/MM3 (ref 0–0.4)
EOSINOPHIL NFR BLD AUTO: 2.8 % (ref 0.3–6.2)
ERYTHROCYTE [DISTWIDTH] IN BLOOD BY AUTOMATED COUNT: 13 % (ref 12.3–15.4)
GFR SERPL CREATININE-BSD FRML MDRD: 50 ML/MIN/1.73
GLUCOSE SERPL-MCNC: 101 MG/DL (ref 65–99)
HCT VFR BLD AUTO: 32.5 % (ref 34–46.6)
HGB BLD-MCNC: 11.3 G/DL (ref 12–15.9)
IMM GRANULOCYTES # BLD AUTO: 0.01 10*3/MM3 (ref 0–0.05)
IMM GRANULOCYTES NFR BLD AUTO: 0.1 % (ref 0–0.5)
INR PPP: 0.98 (ref 0.8–1.2)
LYMPHOCYTES # BLD AUTO: 1.51 10*3/MM3 (ref 0.7–3.1)
LYMPHOCYTES NFR BLD AUTO: 20.1 % (ref 19.6–45.3)
MCH RBC QN AUTO: 32.7 PG (ref 26.6–33)
MCHC RBC AUTO-ENTMCNC: 34.8 G/DL (ref 31.5–35.7)
MCV RBC AUTO: 93.9 FL (ref 79–97)
MONOCYTES # BLD AUTO: 0.49 10*3/MM3 (ref 0.1–0.9)
MONOCYTES NFR BLD AUTO: 6.5 % (ref 5–12)
NEUTROPHILS NFR BLD AUTO: 5.24 10*3/MM3 (ref 1.7–7)
NEUTROPHILS NFR BLD AUTO: 70 % (ref 42.7–76)
NRBC BLD AUTO-RTO: 0 /100 WBC (ref 0–0.2)
PLATELET # BLD AUTO: 204 10*3/MM3 (ref 140–450)
PMV BLD AUTO: 10.2 FL (ref 6–12)
POTASSIUM SERPL-SCNC: 4.4 MMOL/L (ref 3.5–5.2)
PROTHROMBIN TIME: 13.4 SECONDS (ref 11.1–15.3)
RBC # BLD AUTO: 3.46 10*6/MM3 (ref 3.77–5.28)
SODIUM SERPL-SCNC: 141 MMOL/L (ref 136–145)
WBC # BLD AUTO: 7.5 10*3/MM3 (ref 3.4–10.8)

## 2021-02-04 PROCEDURE — 25010000002 MIDAZOLAM PER 1 MG: Performed by: INTERNAL MEDICINE

## 2021-02-04 PROCEDURE — C1785 PMKR, DUAL, RATE-RESP: HCPCS | Performed by: INTERNAL MEDICINE

## 2021-02-04 PROCEDURE — 33228 REMV&REPLC PM GEN DUAL LEAD: CPT | Performed by: INTERNAL MEDICINE

## 2021-02-04 PROCEDURE — 85610 PROTHROMBIN TIME: CPT | Performed by: INTERNAL MEDICINE

## 2021-02-04 PROCEDURE — 25010000002 FENTANYL CITRATE (PF) 100 MCG/2ML SOLUTION: Performed by: INTERNAL MEDICINE

## 2021-02-04 PROCEDURE — 85025 COMPLETE CBC W/AUTO DIFF WBC: CPT | Performed by: INTERNAL MEDICINE

## 2021-02-04 PROCEDURE — 25010000002 GENTAMICIN PER 80 MG: Performed by: INTERNAL MEDICINE

## 2021-02-04 PROCEDURE — 80048 BASIC METABOLIC PNL TOTAL CA: CPT | Performed by: INTERNAL MEDICINE

## 2021-02-04 PROCEDURE — S0260 H&P FOR SURGERY: HCPCS | Performed by: INTERNAL MEDICINE

## 2021-02-04 DEVICE — GEN PM ASSURITY MRI DR RF PM2272: Type: IMPLANTABLE DEVICE | Status: FUNCTIONAL

## 2021-02-04 RX ORDER — LIDOCAINE HYDROCHLORIDE 20 MG/ML
INJECTION, SOLUTION INFILTRATION; PERINEURAL AS NEEDED
Status: DISCONTINUED | OUTPATIENT
Start: 2021-02-04 | End: 2021-02-04 | Stop reason: HOSPADM

## 2021-02-04 RX ORDER — SODIUM CHLORIDE 9 MG/ML
100 INJECTION, SOLUTION INTRAVENOUS CONTINUOUS
Status: DISCONTINUED | OUTPATIENT
Start: 2021-02-04 | End: 2021-02-04 | Stop reason: HOSPADM

## 2021-02-04 RX ORDER — CLINDAMYCIN PHOSPHATE 900 MG/50ML
900 INJECTION INTRAVENOUS ONCE
Status: COMPLETED | OUTPATIENT
Start: 2021-02-04 | End: 2021-02-04

## 2021-02-04 RX ORDER — FENTANYL CITRATE 50 UG/ML
INJECTION, SOLUTION INTRAMUSCULAR; INTRAVENOUS AS NEEDED
Status: DISCONTINUED | OUTPATIENT
Start: 2021-02-04 | End: 2021-02-04 | Stop reason: HOSPADM

## 2021-02-04 RX ORDER — MIDAZOLAM HYDROCHLORIDE 1 MG/ML
INJECTION INTRAMUSCULAR; INTRAVENOUS AS NEEDED
Status: DISCONTINUED | OUTPATIENT
Start: 2021-02-04 | End: 2021-02-04 | Stop reason: HOSPADM

## 2021-02-04 RX ORDER — CLINDAMYCIN PHOSPHATE 600 MG/50ML
600 INJECTION INTRAVENOUS ONCE
Status: COMPLETED | OUTPATIENT
Start: 2021-02-04 | End: 2021-02-04

## 2021-02-04 RX ADMIN — CLINDAMYCIN IN 5 PERCENT DEXTROSE 600 MG: 12 INJECTION, SOLUTION INTRAVENOUS at 09:34

## 2021-02-04 RX ADMIN — GENTAMICIN SULFATE 80 MG: 40 INJECTION, SOLUTION INTRAMUSCULAR; INTRAVENOUS at 09:35

## 2021-02-04 RX ADMIN — SODIUM CHLORIDE 100 ML/HR: 9 INJECTION, SOLUTION INTRAVENOUS at 07:12

## 2021-02-04 RX ADMIN — CLINDAMYCIN IN 5 PERCENT DEXTROSE 900 MG: 18 INJECTION, SOLUTION INTRAVENOUS at 09:35

## 2021-02-04 NOTE — H&P
Cardiology at [facility] History and Physical Note      Abril Avilez  Pool/CATH  [id number]  1945    DATE OF ADMISSION: 2/4/2021    Reason for Hospitalization: Pacemaker generator replacement and released CARLOS status    History of Present Illness:    Body mass index is 32.05 kg/m². BMI is above normal parameters. Recommendations include: exercise counseling, nutrition counseling and referral to primary care.  76 years old patient with history of sick sinus syndrome and paroxysmal atrial fibrillation status post pacemaker, history of hypertension with hypertensive heart disease, hyperlipidemia and history of aortic repair and aortic regurgitation and evaluated at [facility] by Dr. Mack.  Recently pacemaker interrogated reached CARLOS status.  Patient denies orthopnea PND and some baseline shortness of breath followed up with Dr. Weinberg.  No syncope or near syncopal episode reported.  No intermittent claudications.      Allergies   Allergen Reactions   • Hydroxyzine Headache   • Ampicillin Rash   • Milk-Related Compounds GI Intolerance   • Penicillins Rash   • Pravastatin Mental Status Change   • Sulfa Antibiotics Hives       Prior to Admission medications    Medication Sig Start Date End Date Taking? Authorizing Provider   amLODIPine (NORVASC) 10 MG tablet Take 1 tablet by mouth Daily. 10/8/20  Yes Marquita Weinberg MD   aspirin 81 MG EC tablet Take 81 mg by mouth 2 (Two) Times a Day.   Yes ProviderRomana MD   atorvastatin (LIPITOR) 40 MG tablet Take 40 mg by mouth Every Night. 11/5/20  Yes Romana Carbone MD   dilTIAZem CD (CARDIZEM CD) 120 MG 24 hr capsule Take 1 capsule by mouth Daily. 12/1/20  Yes Marquita Weinberg MD   isosorbide mononitrate (IMDUR) 120 MG 24 hr tablet Take 1 tablet by mouth Daily. 12/1/20  Yes Marquita Weinberg MD   lisinopril (PRINIVIL,ZESTRIL) 5 MG tablet Take 1 tablet by mouth Daily. 12/31/20  Yes Marquita Weinberg MD   sotalol (BETAPACE) 80 MG tablet Take 80 mg by  mouth 2 (Two) Times a Day.   Yes Romana Carbone MD   spironolactone-hydrochlorothiazide (ALDACTAZIDE) 25-25 MG tablet Take 1 tablet by mouth Daily.   Yes Romana Carbone MD   nitroglycerin (NITROSTAT) 0.4 MG SL tablet Place 0.4 mg under the tongue Every 5 (Five) Minutes As Needed. do not exceed a total of 3 doses in 15 minutes 11/5/20   ProviderRomana MD       Past Medical History:   Diagnosis Date   • Atrial fibrillation (CMS/[facility])    • Bradycardia    • CHF (congestive heart failure) (CMS/[facility])    • History of transfusion    • Hyperlipidemia    • Hypertension    • Nephropathy    • Presence of cardiac pacemaker    • Shortness of breath    • Stage 4 chronic kidney disease (CMS/[facility])        Past Surgical History:   Procedure Laterality Date   • ANTERIOR CERVICAL FUSION     • CARDIAC CATHETERIZATION N/A 12/16/2020    Procedure: CORONARY ANGIOGRAPHY;  Surgeon: Marquita Weinberg MD;  Location: [facility] CATH INVASIVE LOCATION;  Service: Cardiology;  Laterality: N/A;   • CARDIAC CATHETERIZATION N/A 12/16/2020    Procedure: PERCUTANEOUS CORONARY INTERVENTION;  Surgeon: Marquita Weinberg MD;  Location: [facility] CATH INVASIVE LOCATION;  Service: Cardiology;  Laterality: N/A;   • CARDIAC SURGERY  01/01/2007    AVR   • CATARACT EXTRACTION WITH INTRAOCULAR LENS IMPLANT Bilateral    • CHOLECYSTECTOMY     • COLONOSCOPY N/A 4/14/2017    Procedure: COLONOSCOPY;  Surgeon: Yuri Eaton DO;  Location: [facility] ENDOSCOPY;  Service:    • HYSTERECTOMY     • JOINT REPLACEMENT  01/01/2006   • PACEMAKER IMPLANTATION     • TOTAL KNEE ARTHROPLASTY Left    • TOTAL KNEE ARTHROPLASTY Right        Social History     Socioeconomic History   • Marital status:      Spouse name: Not on file   • Number of children: Not on file   • Years of education: Not on file   • Highest education level: Not on file   Tobacco Use   • Smoking status: Never Smoker   • Smokeless tobacco: Never Used   Substance and Sexual Activity   • Alcohol use: No  "  • Drug use: No   • Sexual activity: Defer       Family History   Problem Relation Age of Onset   • Heart disease Mother    • Heart disease Father        Patient Active Problem List   Diagnosis   • Bradycardia   • Shortness of breath   • Atrial fibrillation (CMS/HCC)   • Essential hypertension   • Pure hypercholesterolemia   • SSS (sick sinus syndrome) (CMS/HCC)   • Presence of cardiac pacemaker   • Chest pain   • Elevated coronary artery calcium score   • Abnormal computed tomography angiography (CTA)   • Pacemaker generator end of life        REVIEW OF SYSTEMS:   Review of Systems    12 point ROS was performed and is negative except as outlined in HPI.       Objective:     Vitals:    01/28/21 1246 02/04/21 0652   BP:  131/62   BP Location:  Left arm   Patient Position:  Lying   Pulse:  98   Resp:  18   Temp:  97.6 °F (36.4 °C)   TempSrc:  Temporal   SpO2:  99%   Weight: 83.5 kg (184 lb) 84.7 kg (186 lb 11.7 oz)   Height: 162.6 cm (64\") 162.6 cm (64\")     Body mass index is 32.05 kg/m².  Flowsheet Rows      First Filed Value   Admission Height  162.6 cm (64\") Documented at 01/28/2021 1246   Admission Weight  83.5 kg (184 lb) Documented at 01/28/2021 1246        No intake or output data in the 24 hours ending 02/04/21 0918      Physical Exam:  Constitutional: Oriented to person, place, and time.  Well-developed and well-nourished. No distress.   HENT: Normocephalic.   Eyes: Conjunctivae are normal. No scleral icterus.   Neck: Normal carotid pulses, no hepatojugular reflux and no JVD present. Carotid bruit is not present. No tracheal deviation, no edema and no erythema present. No thyromegaly present.   Cardiovascular: Normal rate, regular rhythm, S1 normal, S2 normal, normal heart sounds and intact distal pulses.   No extrasystoles are present.   Pulmonary/Chest: Effort normal and breath sounds normal. No respiratory distress.   Abdominal: Soft. Bowel sounds are normal. Exhibits no distension and no mass. "   Musculoskeletal:  Exhibits no edema, no tenderness.   Neurological: Alert and awake.  Skin: Skin warm and dry.  Psychiatric: Normal mood and affect.      Lab Review:                Results from last 7 days   Lab Units 02/04/21  0659   SODIUM mmol/L 141   POTASSIUM mmol/L 4.4   CHLORIDE mmol/L 104   CO2 mmol/L 27.0   BUN mg/dL 22   CREATININE mg/dL 1.07*   GLUCOSE mg/dL 101*     Results from last 7 days   Lab Units 02/04/21  0659   WBC 10*3/mm3 7.50   HEMOGLOBIN g/dL 11.3*   HEMATOCRIT % 32.5*   PLATELETS 10*3/mm3 204     Results from last 7 days   Lab Units 02/04/21  0659   INR  0.98                     I personally viewed and interpreted the patient's EKG/Telemetry data.      Assessment/Plan:   #1 sick sinus syndrome s/p pacemaker reached CARLOS status    76 years old patient under care of Dr. Weinberg and per medical record system pacemaker disorder no recurrence of paroxysmal atrial fibrillation on sotalol.  History of nonobstructive CAD.  She presented for pacemaker generator replacement.  Procedure risk pros and cons discussed with the patient.  Risk included but not obtained infection, bleeding, stroke, pneumothorax, hematoma.  Understand willing to proceed forward.  She is a Mallampati score 2 and ASA class II.    #2 hypertension with hypertensive urgency    She currently being managed with amlodipine lisinopril Aldactazide and Cardizem.    Significantly low carbohydrate, low-fat, DASH diet discussed with the patient's.          This document has been electronically signed by Eron Arroyo MD on February 4, 2021 09:26 CST        Thank you for allowing me to participate in the care of Abril Avilez. Feel free to contact me directly with any further questions or concerns.    Eron Arroyo MD  02/04/21  09:18 CST.    Part of this note may be an electronic transcription/translation of spoken language to printed text using the Dragon Dictation system.

## 2021-02-04 NOTE — DISCHARGE INSTRUCTIONS
Keep dry for 1 week    Wound checkup with the pacer clinic on Monday and then in 1 week      Do not lift left arm above the shoulder and driving for 30 days

## 2021-02-19 ENCOUNTER — TELEPHONE (OUTPATIENT)
Dept: CARDIOLOGY | Facility: CLINIC | Age: 76
End: 2021-02-19

## 2021-02-19 NOTE — TELEPHONE ENCOUNTER
Spoke to patient about wound check being rescheduled from the 16th. I asked her if the steri strips were still on and she said no that they had come off a few days ago. She stated that the incision is not red or swollen and there was no drainage from it. She has an appt with Dr Weinberg in [place] next Thursday the 25th. I have made a note for the MA to look at incision and if needs to be seen sooner than her appt with Dr Arroyo on 3/5/2021 that we will schedule her on the wound schedule. Patient verbalized understanding.

## 2021-02-22 ENCOUNTER — APPOINTMENT (OUTPATIENT)
Dept: VACCINE CLINIC | Facility: HOSPITAL | Age: 76
End: 2021-02-22

## 2021-02-22 RX ORDER — SPIRONOLACTONE AND HYDROCHLOROTHIAZIDE 25; 25 MG/1; MG/1
TABLET ORAL
Qty: 30 TABLET | Refills: 0 | Status: SHIPPED | OUTPATIENT
Start: 2021-02-22 | End: 2021-03-29

## 2021-02-23 PROBLEM — I71.20 THORACIC AORTIC ANEURYSM WITHOUT RUPTURE: Status: ACTIVE | Noted: 2020-12-10

## 2021-02-23 PROBLEM — Z13.6 ENCOUNTER FOR SPECIAL SCREENING EXAMINATION FOR CARDIOVASCULAR DISORDER: Status: ACTIVE | Noted: 2021-01-25

## 2021-02-23 PROBLEM — N18.2 STAGE 2 CHRONIC KIDNEY DISEASE: Status: ACTIVE | Noted: 2020-12-10

## 2021-02-23 PROBLEM — N17.9 ACUTE KIDNEY INJURY: Status: ACTIVE | Noted: 2020-12-10

## 2021-02-23 PROBLEM — I25.10 ARTERIOSCLEROSIS OF CORONARY ARTERY: Status: ACTIVE | Noted: 2020-12-10

## 2021-02-23 PROBLEM — R93.89 ABNORMAL COMPUTED TOMOGRAPHY SCAN: Status: ACTIVE | Noted: 2020-11-25

## 2021-02-23 PROBLEM — S12.9XXA PSEUDOARTHROSIS OF CERVICAL SPINE (HCC): Status: ACTIVE | Noted: 2019-02-06

## 2021-02-23 PROBLEM — Z98.1 ARTHRODESIS STATUS: Status: ACTIVE | Noted: 2019-01-23

## 2021-02-23 PROBLEM — I48.0 PAROXYSMAL ATRIAL FIBRILLATION: Status: ACTIVE | Noted: 2020-12-10

## 2021-02-23 PROCEDURE — 87635 SARS-COV-2 COVID-19 AMP PRB: CPT | Performed by: NURSE PRACTITIONER

## 2021-02-25 ENCOUNTER — OFFICE VISIT (OUTPATIENT)
Dept: CARDIOLOGY | Facility: CLINIC | Age: 76
End: 2021-02-25

## 2021-02-25 VITALS
WEIGHT: 189 LBS | OXYGEN SATURATION: 99 % | DIASTOLIC BLOOD PRESSURE: 68 MMHG | HEIGHT: 64 IN | BODY MASS INDEX: 32.27 KG/M2 | HEART RATE: 59 BPM | SYSTOLIC BLOOD PRESSURE: 124 MMHG

## 2021-02-25 DIAGNOSIS — I71.20 THORACIC AORTIC ANEURYSM WITHOUT RUPTURE (HCC): ICD-10-CM

## 2021-02-25 DIAGNOSIS — R93.1 ELEVATED CORONARY ARTERY CALCIUM SCORE: ICD-10-CM

## 2021-02-25 DIAGNOSIS — I48.0 PAROXYSMAL ATRIAL FIBRILLATION (HCC): Primary | ICD-10-CM

## 2021-02-25 PROCEDURE — 99214 OFFICE O/P EST MOD 30 MIN: CPT | Performed by: INTERNAL MEDICINE

## 2021-02-25 NOTE — PROGRESS NOTES
[facility] Cardiology  OFFICE NOTE    Cardiovascular Medicine  Marquita Weinberg M.D., RPVI         No referring provider defined for this encounter.    Thank you for asking me to see Abril Avilez for f/up.    Atrial Fibrillation  Past medical history includes atrial fibrillation.     This is a 76 y.o. female with:  1. SSS (sick sinus syndrome) (CMS/HCC)    2. Essential hypertension    3. Pure hypercholesterolemia    4. Paroxysmal atrial fibrillation (CMS/HCC)          Chief complaint -Follow-up atrial fibrillation        History of present Illness- 75-year-old lady with history of ascending aortic aneurysm repair in 2010, doing reasonably well .    After last visit patient presented for scheduled out patient echo today to assess her known aortic regurgitation and previous history of aortic repairt.  Possible dissection noted in aorta on echo. Patient sent for CTA of chest. Was noted to have possible dissection and intramural thrombus. I consulted Dr. Cotter in the setting of her previous aortic repair. Dr. Cotter reviewed the films and discussed with Dr. Mack at [facility]. Patient was asymptomatic, per Dr. Cotter's recommendations patient was stable for discharged and to f/up with Dr. Mack, patient has been seen and has been plan for potential second repair and valve replacement.  Patient is currently denying any chest pain or shortness of breath.  she is to report to ER if any sudden chest, back, or shoulder pain of any sudden onset of SOB.       11/12/2020:  Exertional shortness of breath continues to stay the same, had mild improvement with adding Lasix.  She had an episode of chest pain couple of weeks ago, it felt like heaviness in character, it was localized to the substernal area lasted for 20 minutes and then went away on its own.  Did not have any further episodes of chest pain.    12/04/2020:  Patient underwent CTA since last visit which showed obstructive disease in LAD.  Patient  was scheduled for a cardiac cath however her creatinine was elevated and she had SADIE.  She was given IV fluid hydration, adjustments were made in her antihypertensives.  She is here for routine follow-up.  Creatinine has improved.  No further chest pains.    12/31/2020:  Patient underwent cardiac cath since last visit since the CT was abnormal however there was no evidence of obstructive coronary artery disease.  Filling pressures were normal.  Had a mechanical fall last week causing injury to her right knee.  Continues to have significant shortness of breath on exertion    02/25/2021:  Continues to have significant shortness of breath.  Has an appointment coming up with her cardiothoracic surgeon Jammie tomorrow for potential right and left heart cath for further evaluation for her aneurysm and aortic valve.  Patient had her generator change last month.    Review of Systems - ROS  Constitution: Negative for weakness, weight gain and weight loss.   HENT: Negative for congestion.    Eyes: Negative for blurred vision.   Cardiovascular: As mentioned above  Respiratory: Negative for cough and hemoptysis.    Endocrine: Negative for polydipsia and polyuria.   Hematologic/Lymphatic: Negative for bleeding problem. Does not bruise/bleed easily.   Skin: Negative for flushing.   Musculoskeletal: Negative for neck pain and stiffness.   Gastrointestinal: Negative for abdominal pain, diarrhea, jaundice, melena, nausea and vomiting.   Genitourinary: Negative for dysuria and hematuria.   Neurological: Negative for dizziness, focal weakness and numbness.   Psychiatric/Behavioral: Negative for altered mental status and depression.          All other systems were reviewed and were negative.    family history includes Heart disease in her father and mother.     reports that she has never smoked. She has never used smokeless tobacco. She reports that she does not drink alcohol or use drugs.    Allergies   Allergen Reactions   •  "Hydroxyzine Headache   • Ampicillin Rash   • Milk-Related Compounds GI Intolerance   • Penicillins Rash   • Pravastatin Mental Status Change   • Sulfa Antibiotics Hives         Current Outpatient Medications:   •  amLODIPine (NORVASC) 10 MG tablet, Take 1 tablet by mouth Daily., Disp: 90 tablet, Rfl: 3  •  aspirin 81 MG EC tablet, Take 81 mg by mouth 2 (Two) Times a Day., Disp: , Rfl:   •  atorvastatin (LIPITOR) 40 MG tablet, Take 40 mg by mouth Every Night., Disp: , Rfl:   •  dilTIAZem CD (CARDIZEM CD) 120 MG 24 hr capsule, Take 1 capsule by mouth Daily., Disp: 30 capsule, Rfl: 11  •  isosorbide mononitrate (IMDUR) 120 MG 24 hr tablet, Take 1 tablet by mouth Daily., Disp: 30 tablet, Rfl: 3  •  lisinopril (PRINIVIL,ZESTRIL) 5 MG tablet, Take 1 tablet by mouth Daily., Disp: 30 tablet, Rfl: 11  •  nitroglycerin (NITROSTAT) 0.4 MG SL tablet, Place 0.4 mg under the tongue Every 5 (Five) Minutes As Needed. do not exceed a total of 3 doses in 15 minutes, Disp: , Rfl:   •  sotalol (BETAPACE) 80 MG tablet, Take 80 mg by mouth 2 (Two) Times a Day., Disp: , Rfl:   •  spironolactone-hydrochlorothiazide (ALDACTAZIDE) 25-25 MG tablet, Take 1 tablet by mouth once daily, Disp: 30 tablet, Rfl: 0    Physical Exam:  Vitals:    02/25/21 1057   BP: 124/68   BP Location: Left arm   Patient Position: Sitting   Cuff Size: Adult   Pulse: 59   SpO2: 99%   Weight: 85.7 kg (189 lb)   Height: 162.6 cm (64.02\")       Current Pain Level: none  Pulse Ox: Normal  on room air  General: alert, appears stated age and cooperative     Body Habitus: well-nourished    HEENT: Head: Normocephalic, no lesions, without obvious abnormality. No arcus senilis, xanthelasma or xanthomas.    Neuro: alert, oriented x3  Pulses: 2+ and symmetric  JVP: Volume/Pulsation: Normal.  Normal waveforms.   Appropriate inspiratory decrease.  No Kussmaul's. No Gerardo's.   Carotid Exam: no bruit normal pulsation bilaterally   Carotid Volume: normal.     Respirations: no " increased work of breathing   Chest:  Normal    Pulmonary:Normal   Precordium: Normal impulses. P2 is not palpable.  RV Heave: absent  LV Heave: absent  [place]:  normal size and placement  Palpable S4: absent.  Heart rate: normal    Heart Rhythm: regular     Heart Sounds: S1: normal  S2: normal  S3: absent   S4: absent  Opening Snap: absent   Diastolic murmur audible in aortic area.   Pericardial Rub:  Absent: .    Abdomen:   Appearance: normal .  Palpation: Soft, non-tender to palpation, bowel sounds positive in all four quadrants; no guarding or rebound tenderness  Extremity: no edema.   LE Skin: no rashes  LE Hair:  normal  LE Pulses: well perfused with normal pulses in the distal extremities  Pallor on elevation: Absent. Rubor on dependency: None      DATA REVIEWED:     EKG. I personally reviewed and interpreted the EKG.  Atrial paced ventricular sensing    ECG/EMG Results (all)     None        ---------------------------------------------------  TTE/GUSTAVO:  Results for orders placed during the hospital encounter of 10/12/20   Adult Transthoracic Echo Complete W/ Cont if Necessary Per Protocol    Narrative · Aneurysmal dilation of the proximal aorta is present. There is an   unknown type of proximal aorta graft noted. Ascending aorta measures 4.5cm   in diameter (Unchangde from before). Linear echogenic density noted in the   ascending aorta which appears unchanged from prior echo. In the setting of   previous ascending aortic aneurysm repair most likely related to   post-operative changes. Color doppler did not reveal any flow into the   space beyond the echogenic density.  · Left ventricular ejection fraction appears to be 51 - 55%. Left   ventricular systolic function is normal.  · The left atrial cavity is moderately dilated.  · Aneurysmal dilation of the proximal aorta is present.  · Moderate aortic valve regurgitation is present.  · Mild to moderate mitral valve regurgitation is present.  · Estimated right  ventricular systolic pressure from tricuspid   regurgitation is normal (<35 mmHg).            --------------------------------------------------------------------------------------------------  LABS:     The CVD Risk score (Pricila et al., 2008) failed to calculate for the following reasons:    The 2008 CVD risk score is only valid for ages 30 to 74    The patient has a prior MI, stroke, CHF, or peripheral vascular disease diagnosis         Lab Results   Component Value Date    GLUCOSE 101 (H) 02/04/2021    BUN 22 02/04/2021    CREATININE 1.07 (H) 02/04/2021    EGFRIFNONA 50 (L) 02/04/2021    BCR 20.6 02/04/2021    K 4.4 02/04/2021    CO2 27.0 02/04/2021    CALCIUM 9.6 02/04/2021    ALBUMIN 4.30 01/11/2021    AST 20 01/11/2021    ALT 18 01/11/2021     Lab Results   Component Value Date    WBC 7.50 02/04/2021    HGB 11.3 (L) 02/04/2021    HCT 32.5 (L) 02/04/2021    MCV 93.9 02/04/2021     02/04/2021     Lab Results   Component Value Date    CHOL 185 11/03/2020    CHLPL 143 06/17/2016    TRIG 87 11/03/2020    HDL 48 11/03/2020     (H) 11/03/2020     Lab Results   Component Value Date    TSH 1.600 10/13/2020     No results found for: CKTOTAL, CKMB, CKMBINDEX, TROPONINI, TROPONINT  Lab Results   Component Value Date    HGBA1C 5.8 (H) 06/17/2016     No results found for: DDIMER  Lab Results   Component Value Date    ALT 18 01/11/2021     Lab Results   Component Value Date    HGBA1C 5.8 (H) 06/17/2016     Lab Results   Component Value Date    CREATININE 1.07 (H) 02/04/2021     No results found for: IRON, TIBC, FERRITIN  Lab Results   Component Value Date    INR 0.98 02/04/2021    INR 0.98 12/16/2020    INR 0.95 12/01/2020    PROTIME 13.4 02/04/2021    PROTIME 13.4 12/16/2020    PROTIME 13.0 12/01/2020       Assessment/Plan     1. Status post ascending aortic aneurysm repair with no CAD in 2010,  doing well no chest pain or shortness of breath.  Diastolic murmur audible in aortic area.  Concerning  findings on echo and CT as above.  Moderate aortic regurgitation.  She is following with thoracic surgery at [facility].  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  Per imaging with surgery aneurysm has remained stable in size over the last 1 year.  Due to the risks of reoperative surgery she is currently being monitored closely.  Optimal blood pressure control is recommended.  She has been scheduled for repeat CTA in a year.    Last visit patient was complaining of worsening shortness of breath, so we repeated echo which was unchanged, aortic regurgitation moderate with normal LV size and function.   She is established with Dr. Estrada and has an appoint with her tomorrow for further evaluation for arctic valve and aneurysm potential repair.     Continue 40mg mg of Lasix   Cardiac cath was without any evidence of obstructive coronary artery disease.       2. Hypertension:  Amlodipine 10mg,   Cardizem 120 mg  Imdur 120 mg  lisinopril 20 mg was stopped because of SADIE  Aldactazide 25/25  Sotalol 80mg BID    Blood pressure is elevated in office today.  Will restart lisinopril at 5 mg and check BMP in a week.    3. Hyperlipidemia LDL>100, will increased lipitor to 40mgt.     4. sick sinus syndrome status post dual-chamber pacemaker, on sotalol for paroxysmal atrial fibrillation and remains in sinus rhythm.    5.  Paroxysmal A. fib  Previously diagnosed, however recent pacemaker check showed that she was in normal sinus rhythm.  Has been on sotalol for rhythm control.  CHADSVASC is 4.   She has not been on anticoagulation, since she has a pacemaker and there is reliable source of there is no recurrence we will continue to hold off anti-coagulation. Also in the setting of her aortic dilation, its ok to hold off AC.     6.  Coronary artery disease:  Cardiac cath with mild nonobstructive disease and congestive heart failure preserved ejection  fraction:  Optimal blood pressure control as above.    7.  Shortness of breath:  Multifactorial from aortic regurgitation and congestive heart failure preserved ejection fraction:  Optimal blood pressure control as above.  Appointment with surgery tomorrow for consideration for aortic valve evaluation is in repair.      Prevention:  Patient's Body mass index is 32.43 kg/m². BMI is above normal parameters. Recommendations include: exercise counseling and nutrition counseling.      Abril Avilez is not a smoker    AAA Screening:   Not needed            This document has been electronically signed by Marquita Weinberg MD on February 25, 2021 11:03 CST

## 2021-03-01 ENCOUNTER — IMMUNIZATION (OUTPATIENT)
Dept: VACCINE CLINIC | Facility: HOSPITAL | Age: 76
End: 2021-03-01

## 2021-03-01 PROCEDURE — 0001A: CPT | Performed by: THORACIC SURGERY (CARDIOTHORACIC VASCULAR SURGERY)

## 2021-03-01 PROCEDURE — 91300 HC SARSCOV02 VAC 30MCG/0.3ML IM: CPT | Performed by: THORACIC SURGERY (CARDIOTHORACIC VASCULAR SURGERY)

## 2021-03-05 ENCOUNTER — OFFICE VISIT (OUTPATIENT)
Dept: CARDIOLOGY | Facility: CLINIC | Age: 76
End: 2021-03-05

## 2021-03-05 VITALS
BODY MASS INDEX: 32.56 KG/M2 | OXYGEN SATURATION: 98 % | WEIGHT: 190.7 LBS | DIASTOLIC BLOOD PRESSURE: 64 MMHG | SYSTOLIC BLOOD PRESSURE: 120 MMHG | HEART RATE: 59 BPM | HEIGHT: 64 IN

## 2021-03-05 DIAGNOSIS — I49.5 SICK SINUS SYNDROME (HCC): ICD-10-CM

## 2021-03-05 DIAGNOSIS — I48.0 PAROXYSMAL ATRIAL FIBRILLATION (HCC): Primary | ICD-10-CM

## 2021-03-05 DIAGNOSIS — E78.00 PURE HYPERCHOLESTEROLEMIA: ICD-10-CM

## 2021-03-05 DIAGNOSIS — E78.2 MIXED HYPERLIPIDEMIA: ICD-10-CM

## 2021-03-05 PROCEDURE — 93000 ELECTROCARDIOGRAM COMPLETE: CPT | Performed by: INTERNAL MEDICINE

## 2021-03-05 PROCEDURE — 99024 POSTOP FOLLOW-UP VISIT: CPT | Performed by: INTERNAL MEDICINE

## 2021-03-05 NOTE — PROGRESS NOTES
Abril Avilez  76 y.o. female    03/05/2021  1. Paroxysmal atrial fibrillation (CMS/HCC)    2. Sick sinus syndrome (CMS/HCC)    3. Pure hypercholesterolemia    4. Mixed hyperlipidemia        History of Present Illness:    Body mass index is 32.73 kg/m². BMI is above normal parameters. Recommendations include: exercise counseling, nutrition counseling and referral to primary care.    76 years old patient with history of sick sinus syndrome and paroxysmal atrial fibrillation status post pacemaker reached CARLOS status assisted with generator placement presented postop.  Follow-up with your symptoms after cardiac decompensation reported and orthopnea PND no palpitation fluttering.  Site healed very well, history of hypertension with hypertensive heart disease, hyperlipidemia and history of aortic repair and aortic regurgitation and evaluated at [facility] by Dr. Mack.    Patient denies orthopnea PND and some baseline shortness of breath followed up with Dr. Weinberg.  No syncope or near syncopal episode reported.  No intermittent claudications.          SUBJECTIVE:    Allergies   Allergen Reactions   • Hydroxyzine Headache   • Ampicillin Rash   • Milk-Related Compounds GI Intolerance   • Penicillins Rash   • Pravastatin Mental Status Change   • Sulfa Antibiotics Hives         Past Medical History:   Diagnosis Date   • Atrial fibrillation (CMS/HCC)    • Bradycardia    • CHF (congestive heart failure) (CMS/HCC)    • History of transfusion    • Hyperlipidemia    • Hypertension    • Nephropathy    • Presence of cardiac pacemaker    • Shortness of breath    • Stage 4 chronic kidney disease (CMS/HCC)          Past Surgical History:   Procedure Laterality Date   • ANTERIOR CERVICAL FUSION     • CARDIAC CATHETERIZATION N/A 12/16/2020    Procedure: CORONARY ANGIOGRAPHY;  Surgeon: Marquita Weinberg MD;  Location: [facility] INVASIVE LOCATION;  Service: Cardiology;  Laterality: N/A;   • CARDIAC CATHETERIZATION N/A 12/16/2020     Procedure: PERCUTANEOUS CORONARY INTERVENTION;  Surgeon: Marquita Weinberg MD;  Location: [facility] CATH INVASIVE LOCATION;  Service: Cardiology;  Laterality: N/A;   • CARDIAC ELECTROPHYSIOLOGY PROCEDURE N/A 2/4/2021    Procedure: PPM generator change - dual;  Surgeon: Eron Arroyo MD;  Location: [facility] CATH INVASIVE LOCATION;  Service: Cardiology;  Laterality: N/A;   • CARDIAC SURGERY  01/01/2007    AVR   • CATARACT EXTRACTION WITH INTRAOCULAR LENS IMPLANT Bilateral    • CHOLECYSTECTOMY     • COLONOSCOPY N/A 4/14/2017    Procedure: COLONOSCOPY;  Surgeon: Yuri Eaton DO;  Location: [facility] ENDOSCOPY;  Service:    • HYSTERECTOMY     • JOINT REPLACEMENT  01/01/2006   • PACEMAKER IMPLANTATION     • TOTAL KNEE ARTHROPLASTY Left    • TOTAL KNEE ARTHROPLASTY Right          Family History   Problem Relation Age of Onset   • Heart disease Mother    • Heart disease Father          Social History     Socioeconomic History   • Marital status:      Spouse name: Not on file   • Number of children: Not on file   • Years of education: Not on file   • Highest education level: Not on file   Tobacco Use   • Smoking status: Never Smoker   • Smokeless tobacco: Never Used   Substance and Sexual Activity   • Alcohol use: No   • Drug use: No   • Sexual activity: Defer         Current Outpatient Medications   Medication Sig Dispense Refill   • amLODIPine (NORVASC) 10 MG tablet Take 1 tablet by mouth Daily. 90 tablet 3   • aspirin 81 MG EC tablet Take 81 mg by mouth 2 (Two) Times a Day.     • atorvastatin (LIPITOR) 40 MG tablet Take 40 mg by mouth Every Night.     • dilTIAZem CD (CARDIZEM CD) 120 MG 24 hr capsule Take 1 capsule by mouth Daily. 30 capsule 11   • isosorbide mononitrate (IMDUR) 120 MG 24 hr tablet Take 1 tablet by mouth Daily. 30 tablet 3   • lisinopril (PRINIVIL,ZESTRIL) 5 MG tablet Take 1 tablet by mouth Daily. 30 tablet 11   • nitroglycerin (NITROSTAT) 0.4 MG SL tablet Place 0.4 mg under the tongue Every 5 (Five)  "Minutes As Needed. do not exceed a total of 3 doses in 15 minutes     • sotalol (BETAPACE) 80 MG tablet Take 80 mg by mouth 2 (Two) Times a Day.     • spironolactone-hydrochlorothiazide (ALDACTAZIDE) 25-25 MG tablet Take 1 tablet by mouth once daily 30 tablet 0     No current facility-administered medications for this visit.            Review of Systems:     Constitutional:  Denies recent weight loss, weight gain,no change in exercise tolerance.     HENT:  Denies any hearing loss, epistaxis    Eyes: No blurring    Respiratory: No fever cough or dyspnea    Cardiovascular: See H&P    Gastrointestinal:  Denies change in bowel habits and dyspepsia    Endocrine: Negative for cold intolerance, heat intolerance, polydipsia    Genitourinary: Negative.      Musculoskeletal: History of osteoarthritis    Skin:  Deniesrashes, or skin lesions.     Allergic/Immunologic: Negative.  Negative for environmental allergies    Neurological:  Denies any history of recurrent headaches, strokes,     Hematological: Denies any food allergies, seasonal allergies    Psychiatric/Behavioral: Denies any history of depression        OBJECTIVE:    /64   Pulse 59   Ht 162.6 cm (64\")   Wt 86.5 kg (190 lb 11.2 oz)   SpO2 98%   BMI 32.73 kg/m²     Physical Exam:     Constitutional: Cooperative, alert and oriented, well-developed, well-nourished, in no acute distress.     HENT:   Head: Normocephalic, conjunctive is a pink, thyroid is nonpalpable no carotid bruit and trachea central.     Cardiovascular: Regular rhythm, S1 and S2 normal, no S3 or S4. Apical impulse not displaced. No murmurs    Pulmonary/Chest: Chest: No chest wall tenderness no rales and wheezing    Abdominal: Abdomen soft, bowel sounds normoactive, no masses,    Musculoskeletal: No deformities, clubbing, cyanosis, erythema. positive mild edema  Neurological: No gross motor or sensory deficits noted    Skin: Warm and dry to the touch, no apparent skin lesions .     Psychiatric: " He has a normal mood and affect. His behavior is normal        Procedures      Lab Results   Component Value Date    WBC 7.50 02/04/2021    HGB 11.3 (L) 02/04/2021    HCT 32.5 (L) 02/04/2021    MCV 93.9 02/04/2021     02/04/2021     Lab Results   Component Value Date    GLUCOSE 101 (H) 02/04/2021    BUN 22 02/04/2021    CREATININE 1.07 (H) 02/04/2021    EGFRIFNONA 50 (L) 02/04/2021    BCR 20.6 02/04/2021    CO2 27.0 02/04/2021    CALCIUM 9.6 02/04/2021    ALBUMIN 4.30 01/11/2021    AST 20 01/11/2021    ALT 18 01/11/2021     Lab Results   Component Value Date    CHOL 185 11/03/2020    CHOL 162 07/24/2019    CHOL 169 04/26/2018     Lab Results   Component Value Date    TRIG 87 11/03/2020    TRIG 50 07/24/2019    TRIG 87 04/26/2018     Lab Results   Component Value Date    HDL 48 11/03/2020    HDL 57 07/24/2019    HDL 61 04/26/2018     No components found for: LDLCALC  Lab Results   Component Value Date     (H) 11/03/2020    LDL 92 07/24/2019    LDL 72 04/26/2018     No results found for: HDLLDLRATIO  No components found for: CHOLHDL  Lab Results   Component Value Date    HGBA1C 5.8 (H) 06/17/2016     Lab Results   Component Value Date    TSH 1.600 10/13/2020           ASSESSMENT AND PLAN:  #1 sick sinus syndrome s/p pacemaker    The patient is status post pacemaker generator placement.  Site healed very well she is a pleased with the clinical outcome.  EKG confirmed normal functioning pacemaker.  Patient will continue sotalol with history of paroxysmal atrial fibrillation no further records.    #2 hypertension with hypertensive heart disease.    Continue lisinopril 5 mg, isosorbide and diltiazem.    Hyper lipidemia continue atorvastatin.  Patient will continue follow-up with Dr. Weinberg and I will see her on as needed basis.    Diagnoses and all orders for this visit:    1. Paroxysmal atrial fibrillation (CMS/HCC) (Primary)  -     ECG 12 Lead    2. Sick sinus syndrome (CMS/HCC)    3. Pure  hypercholesterolemia    4. Mixed hyperlipidemia          Eron Arroyo MD  3/5/2021  09:21 CST

## 2021-03-12 LAB
QT INTERVAL: 480 MS
QTC INTERVAL: 475 MS

## 2021-03-15 ENCOUNTER — APPOINTMENT (OUTPATIENT)
Dept: VACCINE CLINIC | Facility: HOSPITAL | Age: 76
End: 2021-03-15

## 2021-03-22 ENCOUNTER — IMMUNIZATION (OUTPATIENT)
Dept: VACCINE CLINIC | Facility: HOSPITAL | Age: 76
End: 2021-03-22

## 2021-03-22 PROCEDURE — 0002A: CPT | Performed by: THORACIC SURGERY (CARDIOTHORACIC VASCULAR SURGERY)

## 2021-03-22 PROCEDURE — 91300 HC SARSCOV02 VAC 30MCG/0.3ML IM: CPT | Performed by: THORACIC SURGERY (CARDIOTHORACIC VASCULAR SURGERY)

## 2021-03-29 RX ORDER — ISOSORBIDE MONONITRATE 120 MG/1
TABLET, EXTENDED RELEASE ORAL
Qty: 90 TABLET | Refills: 0 | Status: SHIPPED | OUTPATIENT
Start: 2021-03-29 | End: 2021-07-06

## 2021-03-29 RX ORDER — SPIRONOLACTONE AND HYDROCHLOROTHIAZIDE 25; 25 MG/1; MG/1
TABLET ORAL
Qty: 90 TABLET | Refills: 3 | Status: SHIPPED | OUTPATIENT
Start: 2021-03-29 | End: 2022-01-13 | Stop reason: ALTCHOICE

## 2021-04-12 RX ORDER — SOTALOL HYDROCHLORIDE 80 MG/1
TABLET ORAL
Qty: 180 TABLET | Refills: 3 | Status: SHIPPED | OUTPATIENT
Start: 2021-04-12 | End: 2022-05-23

## 2021-05-13 PROCEDURE — 93294 REM INTERROG EVL PM/LDLS PM: CPT | Performed by: NURSE PRACTITIONER

## 2021-05-13 PROCEDURE — 93296 REM INTERROG EVL PM/IDS: CPT | Performed by: NURSE PRACTITIONER

## 2021-06-03 ENCOUNTER — OFFICE VISIT (OUTPATIENT)
Dept: CARDIOLOGY | Facility: CLINIC | Age: 76
End: 2021-06-03

## 2021-06-03 VITALS
SYSTOLIC BLOOD PRESSURE: 146 MMHG | DIASTOLIC BLOOD PRESSURE: 70 MMHG | BODY MASS INDEX: 31.1 KG/M2 | OXYGEN SATURATION: 98 % | WEIGHT: 182.2 LBS | HEART RATE: 60 BPM | HEIGHT: 64 IN

## 2021-06-03 DIAGNOSIS — I71.20 THORACIC AORTIC ANEURYSM WITHOUT RUPTURE (HCC): ICD-10-CM

## 2021-06-03 DIAGNOSIS — I48.0 PAROXYSMAL ATRIAL FIBRILLATION (HCC): Primary | ICD-10-CM

## 2021-06-03 DIAGNOSIS — I49.5 SICK SINUS SYNDROME (HCC): ICD-10-CM

## 2021-06-03 PROCEDURE — 99214 OFFICE O/P EST MOD 30 MIN: CPT | Performed by: INTERNAL MEDICINE

## 2021-06-03 RX ORDER — FUROSEMIDE 40 MG/1
40 TABLET ORAL AS NEEDED
COMMUNITY
End: 2022-05-06 | Stop reason: SDUPTHER

## 2021-06-03 RX ORDER — LISINOPRIL 10 MG/1
5 TABLET ORAL DAILY
Qty: 30 TABLET | Refills: 4 | Status: SHIPPED | OUTPATIENT
Start: 2021-06-03 | End: 2021-06-04 | Stop reason: SDUPTHER

## 2021-06-03 NOTE — PROGRESS NOTES
[facility]  OFFICE NOTE    Cardiovascular Medicine  Marquita Weinberg M.D., RPVI         No referring provider defined for this encounter.    Thank you for asking me to see Abril Avilez for f/up.    Atrial Fibrillation  Past medical history includes atrial fibrillation.     This is a 76 y.o. female with:  1. SSS (sick sinus syndrome) (CMS/HCC)    2. Essential hypertension    3. Pure hypercholesterolemia    4. Paroxysmal atrial fibrillation (CMS/HCC)          Chief complaint -Follow-up atrial fibrillation        History of present Illness- 76 y.o.-year-old lady with history of ascending aortic aneurysm repair in 2010. patient presented for scheduled out patient echo to assess her known aortic regurgitation and previous history of aortic repairt.  Possible dissection noted in aorta on echo. Patient sent for CTA of chest. Was noted to have possible dissection and intramural thrombus. I consulted Dr. Cotter in the setting of her previous aortic repair. Dr. Cotter reviewed the films and discussed with Dr. Mack at [facility]. Patient was asymptomatic, per Dr. Cotter's recommendations patient was stable for discharged and to f/up with Dr. Mack    However patient worsening shortness of breath over the last 1 year, also an episode of chest pain so she underwent a CTA coronary angiogram which showed potential obstructive disease in the LAD followed by cardiac cath which was without any evidence of obstructive coronary artery disease in the LAD.    Repeat echocardiogram continues to show moderate aortic regurgitation.  Since her symptoms of shortness of breath were getting worse referred her back to [facility] for consideration for aortic valve repair since that is contributing mostly to her symptoms of shortness of breath.    Patient was seen at [facility].,  She had a repeat right and left heart cath, coronaries with nonobstructive disease, she was found to have moderate aortic admission echocardiogram  "and filling pressures were reported to be on the higher side.  She was started on Lasix.    Patient was also referred to a cardiologist at [facility].  An MRI was requested for further evaluation of her to \"but her pacemaker leads were not compatible.  There is also discussion of the GUSTAVO that has not been performed.    She has not seen Dr. Mack in a while though.    She continues to have significant shortness of breath on exertion and activities of daily living at home.  Had some improvement in her symptoms with Lasix however still has significant limitations in and she is is cut back on her activities.  Denying orthopnea PND    Review of Systems - ROS  Constitution: Negative for weakness, weight gain and weight loss.   HENT: Negative for congestion.    Eyes: Negative for blurred vision.   Cardiovascular: As mentioned above  Respiratory: Negative for cough and hemoptysis.    Endocrine: Negative for polydipsia and polyuria.   Hematologic/Lymphatic: Negative for bleeding problem. Does not bruise/bleed easily.   Skin: Negative for flushing.   Musculoskeletal: Negative for neck pain and stiffness.   Gastrointestinal: Negative for abdominal pain, diarrhea, jaundice, melena, nausea and vomiting.   Genitourinary: Negative for dysuria and hematuria.   Neurological: Negative for dizziness, focal weakness and numbness.   Psychiatric/Behavioral: Negative for altered mental status and depression.          All other systems were reviewed and were negative.    family history includes Heart disease in her father and mother.     reports that she has never smoked. She has never used smokeless tobacco. She reports that she does not drink alcohol and does not use drugs.    Allergies   Allergen Reactions   • Hydroxyzine Headache   • Ampicillin Rash   • Milk-Related Compounds GI Intolerance   • Penicillins Rash   • Pravastatin Mental Status Change   • Sulfa Antibiotics Hives         Current Outpatient Medications:   •  amLODIPine " "(NORVASC) 10 MG tablet, Take 1 tablet by mouth Daily., Disp: 90 tablet, Rfl: 3  •  aspirin 81 MG EC tablet, Take 81 mg by mouth 2 (Two) Times a Day., Disp: , Rfl:   •  atorvastatin (LIPITOR) 40 MG tablet, Take 40 mg by mouth Every Night., Disp: , Rfl:   •  dilTIAZem CD (CARDIZEM CD) 120 MG 24 hr capsule, Take 1 capsule by mouth Daily., Disp: 30 capsule, Rfl: 11  •  furosemide (LASIX) 40 MG tablet, Take 40 mg by mouth Daily., Disp: , Rfl:   •  isosorbide mononitrate (IMDUR) 120 MG 24 hr tablet, Take 1 tablet by mouth once daily, Disp: 90 tablet, Rfl: 0  •  lisinopril (PRINIVIL,ZESTRIL) 5 MG tablet, Take 1 tablet by mouth Daily., Disp: 30 tablet, Rfl: 11  •  nitroglycerin (NITROSTAT) 0.4 MG SL tablet, Place 0.4 mg under the tongue Every 5 (Five) Minutes As Needed. do not exceed a total of 3 doses in 15 minutes, Disp: , Rfl:   •  sotalol (BETAPACE) 80 MG tablet, Take 1 tablet by mouth twice daily, Disp: 180 tablet, Rfl: 3  •  spironolactone-hydrochlorothiazide (ALDACTAZIDE) 25-25 MG tablet, Take 1 tablet by mouth once daily, Disp: 90 tablet, Rfl: 3    Physical Exam:  Vitals:    06/03/21 0908   BP: 146/70   BP Location: Left arm   Patient Position: Sitting   Cuff Size: Adult   Pulse: 60   SpO2: 98%   Weight: 82.6 kg (182 lb 3.2 oz)   Height: 162.6 cm (64\")       Current Pain Level: none  Pulse Ox: Normal  on room air  General: alert, appears stated age and cooperative     Body Habitus: well-nourished    HEENT: Head: Normocephalic, no lesions, without obvious abnormality. No arcus senilis, xanthelasma or xanthomas.    Neuro: alert, oriented x3  Pulses: 2+ and symmetric  JVP: Volume/Pulsation: Normal.  Normal waveforms.   Appropriate inspiratory decrease.  No Kussmaul's. No Gerardo's.   Carotid Exam: no bruit normal pulsation bilaterally   Carotid Volume: normal.     Respirations: no increased work of breathing   Chest:  Normal    Pulmonary:Normal   Precordium: Normal impulses. P2 is not palpable.  RV Heave: absent  LV " Heave: absent  [place]:  normal size and placement  Palpable S4: absent.  Heart rate: normal    Heart Rhythm: regular     Heart Sounds: S1: normal  S2: normal  S3: absent   S4: absent  Opening Snap: absent   Diastolic murmur audible in aortic area.   Pericardial Rub:  Absent: .    Abdomen:   Appearance: normal .  Palpation: Soft, non-tender to palpation, bowel sounds positive in all four quadrants; no guarding or rebound tenderness  Extremity: no edema.   LE Skin: no rashes  LE Hair:  normal  LE Pulses: well perfused with normal pulses in the distal extremities  Pallor on elevation: Absent. Rubor on dependency: None      DATA REVIEWED:     EKG. I personally reviewed and interpreted the EKG.  Atrial paced ventricular sensing    ECG/EMG Results (all)     None        ---------------------------------------------------  TTE/GUSTAVO:  Results for orders placed during the hospital encounter of 10/12/20    Adult Transthoracic Echo Complete W/ Cont if Necessary Per Protocol    Interpretation Summary  · Aneurysmal dilation of the proximal aorta is present. There is an unknown type of proximal aorta graft noted. Ascending aorta measures 4.5cm in diameter (Unchangde from before). Linear echogenic density noted in the ascending aorta which appears unchanged from prior echo. In the setting of previous ascending aortic aneurysm repair most likely related to post-operative changes. Color doppler did not reveal any flow into the space beyond the echogenic density.  · Left ventricular ejection fraction appears to be 51 - 55%. Left ventricular systolic function is normal.  · The left atrial cavity is moderately dilated.  · Aneurysmal dilation of the proximal aorta is present.  · Moderate aortic valve regurgitation is present.  · Mild to moderate mitral valve regurgitation is present.  · Estimated right ventricular systolic pressure from tricuspid regurgitation is normal (<35  mmHg).        --------------------------------------------------------------------------------------------------  LABS:     The CVD Risk score (Pricila et al., 2008) failed to calculate for the following reasons:    The 2008 CVD risk score is only valid for ages 30 to 74    The patient has a prior MI, stroke, CHF, or peripheral vascular disease diagnosis         Lab Results   Component Value Date    GLUCOSE 101 (H) 02/04/2021    BUN 22 02/04/2021    CREATININE 1.07 (H) 02/04/2021    EGFRIFNONA 50 (L) 02/04/2021    BCR 20.6 02/04/2021    K 4.4 02/04/2021    CO2 27.0 02/04/2021    CALCIUM 9.6 02/04/2021    ALBUMIN 4.30 01/11/2021    AST 20 01/11/2021    ALT 18 01/11/2021     Lab Results   Component Value Date    WBC 7.50 02/04/2021    HGB 11.3 (L) 02/04/2021    HCT 32.5 (L) 02/04/2021    MCV 93.9 02/04/2021     02/04/2021     Lab Results   Component Value Date    CHOL 185 11/03/2020    CHLPL 143 06/17/2016    TRIG 87 11/03/2020    HDL 48 11/03/2020     (H) 11/03/2020     Lab Results   Component Value Date    TSH 1.600 10/13/2020     No results found for: CKTOTAL, CKMB, CKMBINDEX, TROPONINI, TROPONINT  Lab Results   Component Value Date    HGBA1C 5.8 (H) 06/17/2016     No results found for: DDIMER  Lab Results   Component Value Date    ALT 18 01/11/2021     Lab Results   Component Value Date    HGBA1C 5.8 (H) 06/17/2016     Lab Results   Component Value Date    CREATININE 1.07 (H) 02/04/2021     No results found for: IRON, TIBC, FERRITIN  Lab Results   Component Value Date    INR 0.98 02/04/2021    INR 0.98 12/16/2020    INR 0.95 12/01/2020    PROTIME 13.4 02/04/2021    PROTIME 13.4 12/16/2020    PROTIME 13.0 12/01/2020       Assessment/Plan     1. Status post ascending aortic aneurysm repair:  Concerning findings on echo and CT as above.  Moderate aortic regurgitation.  She is following with thoracic surgery at [place].  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  Per imaging with surgery aneurysm has remained stable in size over the last 1 year.  Due to the risks of reoperative surgery she is currently being monitored closely.  Optimal blood pressure control is recommended.      However patient as complaining of worsening shortness of breath, so we repeated echo which was unchanged, aortic regurgitation moderate with normal LV size and function.   Personally I think her noted regurgitation is contributing to her symptoms, she has moderate aortic regurgitation gets worse when she is exerting with her activities.    Follows at [facility] now.     2.  Aortic regurgitation:  Moderate aortic regurgitation noted on echocardiogram has been ordered.  Is not a candidate for MRI because of her pacemaker leads.  I do not think a GUSTAVO is needed at this point, unless CT surgery at [facility] requests for it and will help in their decision making     2. Hypertension:  Amlodipine 10mg,   Cardizem 120 mg  Imdur 120 mg  lisinopril 5 mg  Aldactazide 25/25  Sotalol 80mg BID    Plan: Increase lisinopril to 10 mg and check BMP in a week.      3. Hyperlipidemia LDL>100, will increased lipitor to 40mgt.     4. sick sinus syndrome status post dual-chamber pacemaker, on sotalol for paroxysmal atrial fibrillation and remains in sinus rhythm.  Status post recent generator change in March 2021.    5.  Paroxysmal A. fib  Previously diagnosed, however recent pacemaker check showed that she was in normal sinus rhythm.  Has been on sotalol for rhythm control.  CHADSVASC is 4.   She has not been on anticoagulation, since she has a pacemaker and there is reliable source of there is no recurrence we will continue to hold off anti-coagulation. Also in the setting of her aortic dilation, its ok to hold off AC.     6.  Coronary artery disease:  Cardiac cath with mild nonobstructive disease and congestive heart failure preserved ejection fraction:  Optimal blood pressure control as  above.    7.  Shortness of breath:  Multifactorial from aortic regurgitation and congestive heart failure preserved ejection fraction:  Optimal blood pressure control as above.  Appointment with surgery tomorrow for consideration for aortic valve evaluation is in repair.      Prevention:  Patient's Body mass index is 31.27 kg/m². BMI is above normal parameters. Recommendations include: exercise counseling and nutrition counseling.      Abril Avilez is not a smoker    AAA Screening:   Not needed            This document has been electronically signed by Marquita Weinberg MD on Sun 3, 2021 09:16 CDT

## 2021-06-04 ENCOUNTER — TELEPHONE (OUTPATIENT)
Dept: CARDIOLOGY | Facility: CLINIC | Age: 76
End: 2021-06-04

## 2021-06-04 RX ORDER — LISINOPRIL 10 MG/1
10 TABLET ORAL DAILY
Qty: 30 TABLET | Refills: 4 | Status: SHIPPED | OUTPATIENT
Start: 2021-06-04 | End: 2022-01-13

## 2021-06-04 NOTE — TELEPHONE ENCOUNTER
Attempted to contact patient regarding lisinopril. Voicemail left asking the patient to contact the office.

## 2021-06-08 ENCOUNTER — LAB (OUTPATIENT)
Dept: LAB | Facility: HOSPITAL | Age: 76
End: 2021-06-08

## 2021-06-08 LAB
ALBUMIN SERPL-MCNC: 4.6 G/DL (ref 3.5–5.2)
ALBUMIN/GLOB SERPL: 1.6 G/DL
ALP SERPL-CCNC: 94 U/L (ref 39–117)
ALT SERPL W P-5'-P-CCNC: 12 U/L (ref 1–33)
ANION GAP SERPL CALCULATED.3IONS-SCNC: 11.1 MMOL/L (ref 5–15)
AST SERPL-CCNC: 15 U/L (ref 1–32)
BILIRUB SERPL-MCNC: 1 MG/DL (ref 0–1.2)
BUN SERPL-MCNC: 46 MG/DL (ref 8–23)
BUN/CREAT SERPL: 32.4 (ref 7–25)
CALCIUM SPEC-SCNC: 9.5 MG/DL (ref 8.6–10.5)
CHLORIDE SERPL-SCNC: 101 MMOL/L (ref 98–107)
CO2 SERPL-SCNC: 26.9 MMOL/L (ref 22–29)
CREAT SERPL-MCNC: 1.42 MG/DL (ref 0.57–1)
GFR SERPL CREATININE-BSD FRML MDRD: 36 ML/MIN/1.73
GLOBULIN UR ELPH-MCNC: 2.9 GM/DL
GLUCOSE SERPL-MCNC: 97 MG/DL (ref 65–99)
POTASSIUM SERPL-SCNC: 4.8 MMOL/L (ref 3.5–5.2)
PROT SERPL-MCNC: 7.5 G/DL (ref 6–8.5)
SODIUM SERPL-SCNC: 139 MMOL/L (ref 136–145)

## 2021-06-08 PROCEDURE — 36415 COLL VENOUS BLD VENIPUNCTURE: CPT | Performed by: INTERNAL MEDICINE

## 2021-06-08 PROCEDURE — 80053 COMPREHEN METABOLIC PANEL: CPT | Performed by: INTERNAL MEDICINE

## 2021-06-09 DIAGNOSIS — N18.2 STAGE 2 CHRONIC KIDNEY DISEASE: Primary | ICD-10-CM

## 2021-06-09 NOTE — PROGRESS NOTES
Marquita Weinberg MD Brown, Karen M, RN  Cr is slightly elevated, will check again next week, for now continue same medications     Patient notified and she verbalized understanding. Order for bmp placed

## 2021-06-17 ENCOUNTER — LAB (OUTPATIENT)
Dept: LAB | Facility: HOSPITAL | Age: 76
End: 2021-06-17

## 2021-06-17 DIAGNOSIS — N18.2 STAGE 2 CHRONIC KIDNEY DISEASE: ICD-10-CM

## 2021-06-17 LAB
ANION GAP SERPL CALCULATED.3IONS-SCNC: 10 MMOL/L (ref 5–15)
BUN SERPL-MCNC: 50 MG/DL (ref 8–23)
BUN/CREAT SERPL: 33.3 (ref 7–25)
CALCIUM SPEC-SCNC: 9.2 MG/DL (ref 8.6–10.5)
CHLORIDE SERPL-SCNC: 102 MMOL/L (ref 98–107)
CO2 SERPL-SCNC: 25 MMOL/L (ref 22–29)
CREAT SERPL-MCNC: 1.5 MG/DL (ref 0.57–1)
GFR SERPL CREATININE-BSD FRML MDRD: 34 ML/MIN/1.73
GLUCOSE SERPL-MCNC: 90 MG/DL (ref 65–99)
POTASSIUM SERPL-SCNC: 4.4 MMOL/L (ref 3.5–5.2)
SODIUM SERPL-SCNC: 137 MMOL/L (ref 136–145)

## 2021-06-17 PROCEDURE — 80048 BASIC METABOLIC PNL TOTAL CA: CPT

## 2021-06-17 PROCEDURE — 36415 COLL VENOUS BLD VENIPUNCTURE: CPT

## 2021-06-18 ENCOUNTER — DOCUMENTATION (OUTPATIENT)
Dept: CARDIOLOGY | Facility: CLINIC | Age: 76
End: 2021-06-18

## 2021-06-18 NOTE — PROGRESS NOTES
----- Message -----   From: Susi Wallace, RN   Sent: 6/18/2021  10:52 AM CDT   To: Marquita Weinberg MD     She hasn't seen [facility] since feb, she has an appt with them but can;t remember when, I advised her to call his office to see when it was and if not soon then see if can move up.Ill follow up with this to make sure she gets seen   ----- Message -----   From: Marquita Weinberg MD   Sent: 6/18/2021   9:11 AM CDT   To: Susi Wallace, RN     Does she nephrology? If not we should have her see them to keep an eye on her CKD,   ----- Message -----   From: Lab, Background User   Sent: 6/17/2021   7:35 PM CDT   To: Marquita Weinberg MD      Patient needs to be called on Monday wo see if she was able to move her appt with Dr Bella sooner

## 2021-06-22 ENCOUNTER — DOCUMENTATION (OUTPATIENT)
Dept: CARDIOLOGY | Facility: CLINIC | Age: 76
End: 2021-06-22

## 2021-06-22 NOTE — PROGRESS NOTES
Marquita Weinberg MD Brown, Karen M RN  Ok thanks.           Previous Messages       ----- Message -----   From: Susi Wallace, RN   Sent: 6/22/2021   1:14 PM CDT   To: Marquita Weinberg MD     I called to check on this she reported to me that she got an appt for July 14th with Dr Bella. Just fyi   ----- Message -----   From: Marquita Weinberg MD   Sent: 6/18/2021  11:18 AM CDT   To: Susi Wallace RN     thanks   ----- Message -----   From: Susi Wallace, CHRISTIANO   Sent: 6/18/2021  10:52 AM CDT   To: Marquita Weinberg MD     She hasn't seen Jena since feb, she has an appt with them but can;t remember when, I advised her to call his office to see when it was and if not soon then see if can move up.Ill follow up with this to make sure she gets seen   ----- Message -----   From: Marquita Weinberg MD   Sent: 6/18/2021   9:11 AM CDT   To: Susi Wallace RN     Does she nephrology? If not we should have her see them to keep an eye on her CKD,   ----- Message -----   From: Lab, Background User   Sent: 6/17/2021   7:35 PM CDT   To: Marquita Weinberg MD      Patient has appt with Dr Bellas office on July 14th per patient report

## 2021-07-06 RX ORDER — ISOSORBIDE MONONITRATE 120 MG/1
TABLET, EXTENDED RELEASE ORAL
Qty: 90 TABLET | Refills: 0 | Status: SHIPPED | OUTPATIENT
Start: 2021-07-06 | End: 2021-10-05

## 2021-08-11 ENCOUNTER — CLINICAL SUPPORT (OUTPATIENT)
Dept: CARDIOLOGY | Facility: CLINIC | Age: 76
End: 2021-08-11

## 2021-08-11 DIAGNOSIS — I48.0 PAROXYSMAL ATRIAL FIBRILLATION (HCC): ICD-10-CM

## 2021-08-11 DIAGNOSIS — I49.5 SICK SINUS SYNDROME (HCC): Primary | ICD-10-CM

## 2021-08-11 DIAGNOSIS — Z95.0 PRESENCE OF CARDIAC PACEMAKER: ICD-10-CM

## 2021-08-11 NOTE — PROGRESS NOTES
Pacemaker Evaluation Report    August 16, 2021    Primary Cardiologist: Dr. Weinberg  Implanting MD: Dr. Arroyo  : [employer] Model: 2272 Serial Number: [id number]   Implant date: 2/5/2021     Reason for evaluation:routine, PPM, office  Cardiac device indication(s):sinus node dysfunction/SSS    Battery  CARLOS: 6-8.5 yrs     Interrogation Results  Atrial sensing: P wave: 2.3 mV  Atrial capture: 0.75 V @ 0.5 ms   Atrial lead impedance: 290 ohms  Ventricular sensing: R wave: Dependent  Ventricular capture: 1.0 V @ 0.5 ms  Ventricular lead impedance: right  300 ohms    Parameters  Mode: DDDR  Base Rate: 60/120    Diagnostic Data  Atrial paced: 99 %  Ventricular paced: 95 %  Mode switch:< 1%  AT/AF [place]: <1%  AHR: 771 counts ( appears to be noise, hx of)  VHR: 0  Hx of noise      Intrinsic rate: Dependent    Changes made: No changes    Conclusions: normal device function and Follow up in 6 months       Assessment:  1. Sick sinus syndrome (CMS/HCC)    2. Paroxysmal atrial fibrillation (CMS/HCC)    3. Presence of cardiac pacemaker      - NO oac indicated per Dr. Weinberg. Remains NSR on sotalol.              This document has been electronically signed by LEONOR Pierre on August 16, 2021 15:43 CDT

## 2021-08-16 PROCEDURE — 93288 INTERROG EVL PM/LDLS PM IP: CPT | Performed by: NURSE PRACTITIONER

## 2021-09-16 ENCOUNTER — OFFICE VISIT (OUTPATIENT)
Dept: CARDIOLOGY | Facility: CLINIC | Age: 76
End: 2021-09-16

## 2021-09-16 VITALS
HEART RATE: 59 BPM | TEMPERATURE: 97.3 F | HEIGHT: 64 IN | SYSTOLIC BLOOD PRESSURE: 118 MMHG | OXYGEN SATURATION: 99 % | WEIGHT: 184.2 LBS | DIASTOLIC BLOOD PRESSURE: 56 MMHG | BODY MASS INDEX: 31.45 KG/M2

## 2021-09-16 DIAGNOSIS — I48.0 PAF (PAROXYSMAL ATRIAL FIBRILLATION) (HCC): Primary | ICD-10-CM

## 2021-09-16 PROCEDURE — 93000 ELECTROCARDIOGRAM COMPLETE: CPT | Performed by: INTERNAL MEDICINE

## 2021-09-16 PROCEDURE — 99214 OFFICE O/P EST MOD 30 MIN: CPT | Performed by: INTERNAL MEDICINE

## 2021-09-16 RX ORDER — AMLODIPINE BESYLATE 5 MG/1
5 TABLET ORAL DAILY
Qty: 30 TABLET | Refills: 10 | Status: SHIPPED | OUTPATIENT
Start: 2021-09-16 | End: 2022-05-12

## 2021-09-16 NOTE — PROGRESS NOTES
[facility]  OFFICE NOTE    Cardiovascular Medicine  Marquita Weinberg M.D., RPVI         No referring provider defined for this encounter.    Thank you for asking me to see Abril Avilez for f/up.    Atrial Fibrillation  Past medical history includes atrial fibrillation.     This is a 76 y.o. female with:  1. SSS (sick sinus syndrome) (CMS/HCC)    2. Essential hypertension    3. Pure hypercholesterolemia    4. Paroxysmal atrial fibrillation (CMS/HCC)          Chief complaint -Follow-up atrial fibrillation        History of present Illness- 76 y.o.-year-old lady with history of ascending aortic aneurysm repair in 2010. patient presented for scheduled out patient echo 2019 to assess her known aortic regurgitation and previous history of aortic repairt.  Possible dissection noted in aorta on echo. Patient sent for CTA of chest. Was noted to have possible dissection and intramural thrombus. I consulted Dr. Cotter in the setting of her previous aortic repair. Dr. Cotter reviewed the films and discussed with Dr. Mack at [facility]. Patient was asymptomatic, per Dr. Cotter's recommendations patient was stable for discharged and to f/up with Dr. Mack    However patient worsening shortness of breath over the last 1 year, also an episode of chest pain so she underwent a CTA coronary angiogram which showed potential obstructive disease in the LAD followed by cardiac cath which was without any evidence of obstructive coronary artery disease in the LAD.    Repeat echocardiogram continues to show moderate aortic regurgitation.  Since her symptoms of shortness of breath were getting worse referred her back to [facility] for consideration for aortic valve repair since that is contributing mostly to her symptoms of shortness of breath.    Patient was seen at [facility].,  She had a repeat right and left heart cath, coronaries with nonobstructive disease, she was found to have moderate aortic regurg on  "chocardiogram and filling pressures were reported to be on the higher side.  She was started on Lasix.    Patient was also referred to a cardiologist at [facility].  An MRI was requested for further evaluation of her to \"but her pacemaker leads were not compatible.  There is also discussion of the GUSTAVO that has not been performed.    She has not seen Dr. Mack in a while though.    She continues to have significant shortness of breath on exertion and activities of daily living at home.  Had some improvement in her symptoms with Lasix however still has significant limitations in and she is is cut back on her activities.  Denying orthopnea PND    09/16/2021:  No acute issues since last visit.  Shortness of breath is at baseline.      Review of Systems - ROS  Constitution: Negative for weakness, weight gain and weight loss.   HENT: Negative for congestion.    Eyes: Negative for blurred vision.   Cardiovascular: As mentioned above  Respiratory: Negative for cough and hemoptysis.    Endocrine: Negative for polydipsia and polyuria.   Hematologic/Lymphatic: Negative for bleeding problem. Does not bruise/bleed easily.   Skin: Negative for flushing.   Musculoskeletal: Negative for neck pain and stiffness.   Gastrointestinal: Negative for abdominal pain, diarrhea, jaundice, melena, nausea and vomiting.   Genitourinary: Negative for dysuria and hematuria.   Neurological: Negative for dizziness, focal weakness and numbness.   Psychiatric/Behavioral: Negative for altered mental status and depression.          All other systems were reviewed and were negative.    family history includes Heart disease in her father and mother.     reports that she has never smoked. She has never used smokeless tobacco. She reports that she does not drink alcohol and does not use drugs.    Allergies   Allergen Reactions   • Hydroxyzine Headache   • Ampicillin Rash   • Milk-Related Compounds GI Intolerance   • Penicillins Rash   • Pravastatin " "Mental Status Change   • Sulfa Antibiotics Hives         Current Outpatient Medications:   •  amLODIPine (NORVASC) 10 MG tablet, Take 1 tablet by mouth Daily., Disp: 90 tablet, Rfl: 3  •  aspirin 81 MG EC tablet, Take 81 mg by mouth 2 (Two) Times a Day., Disp: , Rfl:   •  atorvastatin (LIPITOR) 40 MG tablet, Take 40 mg by mouth Every Night., Disp: , Rfl:   •  dilTIAZem CD (CARDIZEM CD) 120 MG 24 hr capsule, Take 1 capsule by mouth Daily., Disp: 30 capsule, Rfl: 11  •  isosorbide mononitrate (IMDUR) 120 MG 24 hr tablet, Take 1 tablet by mouth once daily, Disp: 90 tablet, Rfl: 0  •  lisinopril (PRINIVIL,ZESTRIL) 10 MG tablet, Take 1 tablet by mouth Daily., Disp: 30 tablet, Rfl: 4  •  nitroglycerin (NITROSTAT) 0.4 MG SL tablet, Place 0.4 mg under the tongue Every 5 (Five) Minutes As Needed. do not exceed a total of 3 doses in 15 minutes, Disp: , Rfl:   •  sotalol (BETAPACE) 80 MG tablet, Take 1 tablet by mouth twice daily, Disp: 180 tablet, Rfl: 3  •  spironolactone-hydrochlorothiazide (ALDACTAZIDE) 25-25 MG tablet, Take 1 tablet by mouth once daily, Disp: 90 tablet, Rfl: 3  •  furosemide (LASIX) 40 MG tablet, Take 40 mg by mouth Daily., Disp: , Rfl:     Physical Exam:  Vitals:    09/16/21 0925   BP: 118/56   BP Location: Left arm   Patient Position: Sitting   Cuff Size: Adult   Pulse: 59   Temp: 97.3 °F (36.3 °C)   SpO2: 99%   Weight: 83.6 kg (184 lb 3.2 oz)   Height: 162.6 cm (64\")       Current Pain Level: none  Pulse Ox: Normal  on room air  General: alert, appears stated age and cooperative     Body Habitus: well-nourished    HEENT: Head: Normocephalic, no lesions, without obvious abnormality. No arcus senilis, xanthelasma or xanthomas.    Neuro: alert, oriented x3  Pulses: 2+ and symmetric  JVP: Volume/Pulsation: Normal.  Normal waveforms.   Appropriate inspiratory decrease.  No Kussmaul's. No Gerardo's.   Carotid Exam: no bruit normal pulsation bilaterally   Carotid Volume: normal.     Respirations: no " increased work of breathing   Chest:  Normal    Pulmonary:Normal   Precordium: Normal impulses. P2 is not palpable.  RV Heave: absent  LV Heave: absent  [place]:  normal size and placement  Palpable S4: absent.  Heart rate: normal    Heart Rhythm: regular     Heart Sounds: S1: normal  S2: normal  S3: absent   S4: absent  Opening Snap: absent   Diastolic murmur audible in aortic area.   Pericardial Rub:  Absent: .    Abdomen:   Appearance: normal .  Palpation: Soft, non-tender to palpation, bowel sounds positive in all four quadrants; no guarding or rebound tenderness  Extremity: no edema.   LE Skin: no rashes  LE Hair:  normal  LE Pulses: well perfused with normal pulses in the distal extremities  Pallor on elevation: Absent. Rubor on dependency: None      DATA REVIEWED:     EKG. I personally reviewed and interpreted the EKG.  Atrial paced ventricular sensing    ECG/EMG Results (all)     None        ---------------------------------------------------  TTE/GUSTAVO:  Results for orders placed during the hospital encounter of 10/12/20    Adult Transthoracic Echo Complete W/ Cont if Necessary Per Protocol    Interpretation Summary  · Aneurysmal dilation of the proximal aorta is present. There is an unknown type of proximal aorta graft noted. Ascending aorta measures 4.5cm in diameter (Unchangde from before). Linear echogenic density noted in the ascending aorta which appears unchanged from prior echo. In the setting of previous ascending aortic aneurysm repair most likely related to post-operative changes. Color doppler did not reveal any flow into the space beyond the echogenic density.  · Left ventricular ejection fraction appears to be 51 - 55%. Left ventricular systolic function is normal.  · The left atrial cavity is moderately dilated.  · Aneurysmal dilation of the proximal aorta is present.  · Moderate aortic valve regurgitation is present.  · Mild to moderate mitral valve regurgitation is present.  · Estimated right  ventricular systolic pressure from tricuspid regurgitation is normal (<35 mmHg).        --------------------------------------------------------------------------------------------------  LABS:     The CVD Risk score (Pricila et al., 2008) failed to calculate for the following reasons:    The 2008 CVD risk score is only valid for ages 30 to 74    The patient has a prior MI, stroke, CHF, or peripheral vascular disease diagnosis         Lab Results   Component Value Date    GLUCOSE 90 06/17/2021    BUN 50 (H) 06/17/2021    CREATININE 1.50 (H) 06/17/2021    EGFRIFNONA 34 (L) 06/17/2021    BCR 33.3 (H) 06/17/2021    K 4.4 06/17/2021    CO2 25.0 06/17/2021    CALCIUM 9.2 06/17/2021    ALBUMIN 4.60 06/08/2021    AST 15 06/08/2021    ALT 12 06/08/2021     Lab Results   Component Value Date    WBC 7.50 02/04/2021    HGB 11.3 (L) 02/04/2021    HCT 32.5 (L) 02/04/2021    MCV 93.9 02/04/2021     02/04/2021     Lab Results   Component Value Date    CHOL 185 11/03/2020    CHLPL 143 06/17/2016    TRIG 87 11/03/2020    HDL 48 11/03/2020     (H) 11/03/2020     Lab Results   Component Value Date    TSH 1.600 10/13/2020     No results found for: CKTOTAL, CKMB, CKMBINDEX, TROPONINI, TROPONINT  Lab Results   Component Value Date    HGBA1C 5.8 (H) 06/17/2016     No results found for: DDIMER  Lab Results   Component Value Date    ALT 12 06/08/2021     Lab Results   Component Value Date    HGBA1C 5.8 (H) 06/17/2016     Lab Results   Component Value Date    CREATININE 1.50 (H) 06/17/2021     No results found for: IRON, TIBC, FERRITIN  Lab Results   Component Value Date    INR 0.98 02/04/2021    INR 0.98 12/16/2020    INR 0.95 12/01/2020    PROTIME 13.4 02/04/2021    PROTIME 13.4 12/16/2020    PROTIME 13.0 12/01/2020       Assessment/Plan     1. Status post ascending aortic aneurysm repair:  Concerning findings on echo and CT as above.  Moderate aortic regurgitation.  She is following with thoracic surgery at [place].   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  Per imaging with surgery aneurysm has remained stable in size over the last 1 year.  Due to the risks of reoperative surgery she is currently being monitored closely.  Optimal blood pressure control is recommended.      However patient as complaining of worsening shortness of breath, so we repeated echo which was unchanged, aortic regurgitation moderate with normal LV size and function.   Personally I think her noted regurgitation is contributing to her symptoms, she has moderate aortic regurgitation gets worse when she is exerting with her activities.    Follows at [facility] now.     2.  Aortic regurgitation:  Moderate aortic regurgitation noted on echocardiogram has been ordered.  Is not a candidate for MRI because of her pacemaker leads.  I do not think a GUSTAVO is needed at this point, unless CT surgery at [facility] requests for it and will help in their decision making     2. Hypertension:  Amlodipine 10mg,   Cardizem 120 mg  Imdur 120 mg  lisinopril 10 mg  Aldactazide 25/25  Sotalol 80mg BID    At home blood pressure is getting in the 90s, lack of amlodipine 5 mg.      3. Hyperlipidemia LDL>100, will increased lipitor to 40mgt.     4. sick sinus syndrome status post dual-chamber pacemaker, on sotalol for paroxysmal atrial fibrillation and remains in sinus rhythm.  Status post recent generator change in March 2021.    5.  Paroxysmal A. fib  Previously diagnosed, however recent pacemaker check showed that she was in normal sinus rhythm.  Has been on sotalol for rhythm control.  CHADSVASC is 4.   She has not been on anticoagulation, since she has a pacemaker and there is reliable source of there is no recurrence we will continue to hold off anti-coagulation. Also in the setting of her aortic dilation, its ok to hold off AC.     6.  Coronary artery disease:  Cardiac cath with mild nonobstructive disease and  congestive heart failure preserved ejection fraction:  Optimal blood pressure control as above.    7.  Shortness of breath:  Multifactorial from aortic regurgitation and congestive heart failure preserved ejection fraction:  Optimal blood pressure control as above.  Appointment with surgery tomorrow for consideration for aortic valve evaluation is in repair.      Prevention:  Patient's Body mass index is 31.62 kg/m². BMI is above normal parameters. Recommendations include: exercise counseling and nutrition counseling.      Abril Avilez is not a smoker    AAA Screening:   Not needed            This document has been electronically signed by Marquita Weinberg MD on September 16, 2021 09:45 CDT

## 2021-09-21 ENCOUNTER — TELEPHONE (OUTPATIENT)
Dept: CARDIOLOGY | Facility: CLINIC | Age: 76
End: 2021-09-21

## 2021-09-21 LAB
QT INTERVAL: 518 MS
QTC INTERVAL: 504 MS

## 2021-09-21 NOTE — TELEPHONE ENCOUNTER
Patient contacted the office with blood pressure readings. Explained to the patient dr. Weinberg would like her systolic pressure below 130. She will continue to monitor and take medications as directed by dr. Weinberg.    HCP form explained/No HCP form explained/Yes

## 2021-10-05 RX ORDER — ISOSORBIDE MONONITRATE 120 MG/1
TABLET, EXTENDED RELEASE ORAL
Qty: 90 TABLET | Refills: 6 | Status: SHIPPED | OUTPATIENT
Start: 2021-10-05 | End: 2022-10-17

## 2021-10-08 ENCOUNTER — TRANSCRIBE ORDERS (OUTPATIENT)
Dept: LAB | Facility: HOSPITAL | Age: 76
End: 2021-10-08

## 2021-10-08 ENCOUNTER — LAB (OUTPATIENT)
Dept: LAB | Facility: HOSPITAL | Age: 76
End: 2021-10-08

## 2021-10-08 DIAGNOSIS — N18.2 STAGE 2 CHRONIC KIDNEY DISEASE: Primary | ICD-10-CM

## 2021-10-08 DIAGNOSIS — I48.0 PAROXYSMAL ATRIAL FIBRILLATION (HCC): ICD-10-CM

## 2021-10-08 DIAGNOSIS — I10 ESSENTIAL HYPERTENSION: ICD-10-CM

## 2021-10-08 DIAGNOSIS — N18.2 STAGE 2 CHRONIC KIDNEY DISEASE: ICD-10-CM

## 2021-10-08 PROCEDURE — 36415 COLL VENOUS BLD VENIPUNCTURE: CPT

## 2021-10-08 PROCEDURE — 80048 BASIC METABOLIC PNL TOTAL CA: CPT

## 2021-10-09 LAB
ANION GAP SERPL CALCULATED.3IONS-SCNC: 10 MMOL/L (ref 5–15)
BUN SERPL-MCNC: 23 MG/DL (ref 8–23)
BUN/CREAT SERPL: 25.6 (ref 7–25)
CALCIUM SPEC-SCNC: 9.4 MG/DL (ref 8.6–10.5)
CHLORIDE SERPL-SCNC: 105 MMOL/L (ref 98–107)
CO2 SERPL-SCNC: 25 MMOL/L (ref 22–29)
CREAT SERPL-MCNC: 0.9 MG/DL (ref 0.57–1)
GFR SERPL CREATININE-BSD FRML MDRD: 61 ML/MIN/1.73
GLUCOSE SERPL-MCNC: 94 MG/DL (ref 65–99)
POTASSIUM SERPL-SCNC: 4.4 MMOL/L (ref 3.5–5.2)
SODIUM SERPL-SCNC: 140 MMOL/L (ref 136–145)

## 2021-10-12 ENCOUNTER — TELEPHONE (OUTPATIENT)
Dept: CARDIOLOGY | Facility: CLINIC | Age: 76
End: 2021-10-12

## 2021-11-01 DIAGNOSIS — Z86.39 H/O HYPERKALEMIA: Primary | ICD-10-CM

## 2021-12-01 RX ORDER — AMLODIPINE BESYLATE 5 MG/1
TABLET ORAL
Qty: 60 TABLET | Refills: 0 | OUTPATIENT
Start: 2021-12-01

## 2021-12-06 RX ORDER — DILTIAZEM HYDROCHLORIDE 120 MG/1
CAPSULE, COATED, EXTENDED RELEASE ORAL
Qty: 90 CAPSULE | Refills: 1 | Status: SHIPPED | OUTPATIENT
Start: 2021-12-06 | End: 2022-06-09

## 2022-01-13 ENCOUNTER — OFFICE VISIT (OUTPATIENT)
Dept: CARDIOLOGY | Facility: CLINIC | Age: 77
End: 2022-01-13

## 2022-01-13 VITALS
DIASTOLIC BLOOD PRESSURE: 72 MMHG | HEIGHT: 64 IN | SYSTOLIC BLOOD PRESSURE: 160 MMHG | TEMPERATURE: 97.1 F | OXYGEN SATURATION: 98 % | WEIGHT: 182.2 LBS | BODY MASS INDEX: 31.1 KG/M2 | HEART RATE: 68 BPM

## 2022-01-13 DIAGNOSIS — I35.1 NONRHEUMATIC AORTIC VALVE INSUFFICIENCY: ICD-10-CM

## 2022-01-13 DIAGNOSIS — I48.0 PAF (PAROXYSMAL ATRIAL FIBRILLATION): Primary | ICD-10-CM

## 2022-01-13 PROCEDURE — 93000 ELECTROCARDIOGRAM COMPLETE: CPT | Performed by: INTERNAL MEDICINE

## 2022-01-13 PROCEDURE — 99214 OFFICE O/P EST MOD 30 MIN: CPT | Performed by: INTERNAL MEDICINE

## 2022-01-13 RX ORDER — LISINOPRIL 10 MG/1
10 TABLET ORAL DAILY
Qty: 30 TABLET | Refills: 4 | Status: SHIPPED | OUTPATIENT
Start: 2022-01-13 | End: 2022-06-17

## 2022-01-13 RX ORDER — SPIRONOLACTONE 25 MG/1
25 TABLET ORAL DAILY
COMMUNITY

## 2022-01-13 NOTE — PROGRESS NOTES
[facility] Cardiology  OFFICE NOTE    Cardiovascular Medicine  Marquita Weinberg M.D., RPVI         No referring provider defined for this encounter.    Thank you for asking me to see Abril Avilez for f/up.    Atrial Fibrillation  Past medical history includes atrial fibrillation.     This is a 77 y.o. female with:  1. SSS (sick sinus syndrome) (CMS/HCC)    2. Essential hypertension    3. Pure hypercholesterolemia    4. Paroxysmal atrial fibrillation (CMS/HCC)          Chief complaint -Follow-up atrial fibrillation        History of present Illness- 77 y.o.-year-old lady with history of ascending aortic aneurysm repair in 2010. patient presented for scheduled out patient echo 2019 to assess her known aortic regurgitation and previous history of aortic repairt.  Possible dissection noted in aorta on echo. Patient sent for CTA of chest. Was noted to have possible dissection and intramural thrombus. I consulted Dr. Cotter in the setting of her previous aortic repair. Dr. Cotter reviewed the films and discussed with Dr. Mack at [facility]. Patient was asymptomatic, per Dr. Cotter's recommendations patient was stable for discharged and to f/up with Dr. Mack    However patient worsening shortness of breath over the last 1 year, also an episode of chest pain so she underwent a CTA coronary angiogram which showed potential obstructive disease in the LAD followed by cardiac cath which was without any evidence of obstructive coronary artery disease in the LAD.    Repeat echocardiogram continues to show moderate aortic regurgitation.  Since her symptoms of shortness of breath were getting worse referred her back to [facility] for consideration for aortic valve repair since that is contributing mostly to her symptoms of shortness of breath.    Patient was seen at [facility].,  She had a repeat right and left heart cath, coronaries with nonobstructive disease, she was found to have moderate aortic regurg on  "chocardiogram and filling pressures were reported to be on the higher side.  She was started on Lasix.    Patient was also referred to a cardiologist at [facility].  An MRI was requested for further evaluation of her to \"but her pacemaker leads were not compatible.  There is also discussion of the GUSTAVO that has not been performed.    She has not seen Dr. Mack in a while though.    She continues to have significant shortness of breath on exertion and activities of daily living at home.  Had some improvement in her symptoms with Lasix however still has significant limitations in and she is is cut back on her activities.  Denying orthopnea PND    09/16/2021:  No acute issues since last visit.  Shortness of breath is at baseline.    01/13/2022:  Reported improvement in breathing.     Review of Systems - ROS  Constitution: Negative for weakness, weight gain and weight loss.   HENT: Negative for congestion.    Eyes: Negative for blurred vision.   Cardiovascular: As mentioned above  Respiratory: Negative for cough and hemoptysis.    Endocrine: Negative for polydipsia and polyuria.   Hematologic/Lymphatic: Negative for bleeding problem. Does not bruise/bleed easily.   Skin: Negative for flushing.   Musculoskeletal: Negative for neck pain and stiffness.   Gastrointestinal: Negative for abdominal pain, diarrhea, jaundice, melena, nausea and vomiting.   Genitourinary: Negative for dysuria and hematuria.   Neurological: Negative for dizziness, focal weakness and numbness.   Psychiatric/Behavioral: Negative for altered mental status and depression.          All other systems were reviewed and were negative.    family history includes Heart disease in her father and mother.     reports that she has never smoked. She has never used smokeless tobacco. She reports that she does not drink alcohol and does not use drugs.    Allergies   Allergen Reactions   • Hydroxyzine Headache   • Ampicillin Rash   • Milk-Related Compounds GI " "Intolerance   • Penicillins Rash   • Pravastatin Mental Status Change   • Sulfa Antibiotics Hives         Current Outpatient Medications:   •  amLODIPine (NORVASC) 5 MG tablet, Take 1 tablet by mouth Daily., Disp: 30 tablet, Rfl: 10  •  aspirin 81 MG EC tablet, Take 81 mg by mouth 2 (Two) Times a Day., Disp: , Rfl:   •  atorvastatin (LIPITOR) 40 MG tablet, Take 40 mg by mouth Every Night., Disp: , Rfl:   •  dilTIAZem CD (CARDIZEM CD) 120 MG 24 hr capsule, Take 1 capsule by mouth once daily, Disp: 90 capsule, Rfl: 1  •  furosemide (LASIX) 40 MG tablet, Take 40 mg by mouth As Needed., Disp: , Rfl:   •  isosorbide mononitrate (IMDUR) 120 MG 24 hr tablet, Take 1 tablet by mouth once daily, Disp: 90 tablet, Rfl: 6  •  lisinopril (PRINIVIL,ZESTRIL) 10 MG tablet, Take 1 tablet by mouth Daily. (Patient taking differently: Take 5 mg by mouth Daily.), Disp: 30 tablet, Rfl: 4  •  nitroglycerin (NITROSTAT) 0.4 MG SL tablet, Place 0.4 mg under the tongue Every 5 (Five) Minutes As Needed. do not exceed a total of 3 doses in 15 minutes, Disp: , Rfl:   •  sotalol (BETAPACE) 80 MG tablet, Take 1 tablet by mouth twice daily, Disp: 180 tablet, Rfl: 3  •  spironolactone (ALDACTONE) 25 MG tablet, Take 25 mg by mouth Daily., Disp: , Rfl:     Physical Exam:  Vitals:    01/13/22 1106   BP: 160/72   BP Location: Left arm   Patient Position: Sitting   Cuff Size: Adult   Pulse: 68   Temp: 97.1 °F (36.2 °C)   SpO2: 98%   Weight: 82.6 kg (182 lb 3.2 oz)   Height: 162.6 cm (64\")       Current Pain Level: none  Pulse Ox: Normal  on room air  General: alert, appears stated age and cooperative     Body Habitus: well-nourished    HEENT: Head: Normocephalic, no lesions, without obvious abnormality. No arcus senilis, xanthelasma or xanthomas.    Neuro: alert, oriented x3  Pulses: 2+ and symmetric  JVP: Volume/Pulsation: Normal.  Normal waveforms.   Appropriate inspiratory decrease.  No Kussmaul's. No Gerardo's.   Carotid Exam: no bruit normal " pulsation bilaterally   Carotid Volume: normal.     Respirations: no increased work of breathing   Chest:  Normal    Pulmonary:Normal   Precordium: Normal impulses. P2 is not palpable.  RV Heave: absent  LV Heave: absent  Flippin:  normal size and placement  Palpable S4: absent.  Heart rate: normal    Heart Rhythm: regular     Heart Sounds: S1: normal  S2: normal  S3: absent   S4: absent  Opening Snap: absent   Diastolic murmur audible in aortic area.   Pericardial Rub:  Absent: .    Abdomen:   Appearance: normal .  Palpation: Soft, non-tender to palpation, bowel sounds positive in all four quadrants; no guarding or rebound tenderness  Extremity: no edema.   LE Skin: no rashes  LE Hair:  normal  LE Pulses: well perfused with normal pulses in the distal extremities  Pallor on elevation: Absent. Rubor on dependency: None      DATA REVIEWED:     EKG. I personally reviewed and interpreted the EKG.  Atrial paced ventricular sensing    ECG/EMG Results (all)     None        ---------------------------------------------------  TTE/GUSTAVO:  Results for orders placed during the hospital encounter of 10/12/20    Adult Transthoracic Echo Complete W/ Cont if Necessary Per Protocol    Interpretation Summary  · Aneurysmal dilation of the proximal aorta is present. There is an unknown type of proximal aorta graft noted. Ascending aorta measures 4.5cm in diameter (Unchangde from before). Linear echogenic density noted in the ascending aorta which appears unchanged from prior echo. In the setting of previous ascending aortic aneurysm repair most likely related to post-operative changes. Color doppler did not reveal any flow into the space beyond the echogenic density.  · Left ventricular ejection fraction appears to be 51 - 55%. Left ventricular systolic function is normal.  · The left atrial cavity is moderately dilated.  · Aneurysmal dilation of the proximal aorta is present.  · Moderate aortic valve regurgitation is present.  · Mild to  moderate mitral valve regurgitation is present.  · Estimated right ventricular systolic pressure from tricuspid regurgitation is normal (<35 mmHg).        --------------------------------------------------------------------------------------------------  LABS:     The CVD Risk score (Pricila et al., 2008) failed to calculate for the following reasons:    The 2008 CVD risk score is only valid for ages 30 to 74    The patient has a prior MI, stroke, CHF, or peripheral vascular disease diagnosis         Lab Results   Component Value Date    GLUCOSE 94 10/08/2021    BUN 23 10/08/2021    CREATININE 0.90 10/08/2021    EGFRIFNONA 61 10/08/2021    BCR 25.6 (H) 10/08/2021    K 4.4 10/08/2021    CO2 25.0 10/08/2021    CALCIUM 9.4 10/08/2021    ALBUMIN 4.60 06/08/2021    AST 15 06/08/2021    ALT 12 06/08/2021     Lab Results   Component Value Date    WBC 7.50 02/04/2021    HGB 11.3 (L) 02/04/2021    HCT 32.5 (L) 02/04/2021    MCV 93.9 02/04/2021     02/04/2021     Lab Results   Component Value Date    CHOL 185 11/03/2020    CHLPL 143 06/17/2016    TRIG 87 11/03/2020    HDL 48 11/03/2020     (H) 11/03/2020     Lab Results   Component Value Date    TSH 1.600 10/13/2020     No results found for: CKTOTAL, CKMB, CKMBINDEX, TROPONINI, TROPONINT  Lab Results   Component Value Date    HGBA1C 5.8 (H) 06/17/2016     No results found for: DDIMER  Lab Results   Component Value Date    ALT 12 06/08/2021     Lab Results   Component Value Date    HGBA1C 5.8 (H) 06/17/2016     Lab Results   Component Value Date    CREATININE 0.90 10/08/2021     No results found for: IRON, TIBC, FERRITIN  Lab Results   Component Value Date    INR 0.98 02/04/2021    INR 0.98 12/16/2020    INR 0.95 12/01/2020    PROTIME 13.4 02/04/2021    PROTIME 13.4 12/16/2020    PROTIME 13.0 12/01/2020       Assessment/Plan     1. Status post ascending aortic aneurysm repair:  Concerning findings on echo and CT as above.  Moderate aortic regurgitation.  She  is following with thoracic surgery at [place].  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  Per imaging with surgery aneurysm has remained stable in size over the last 1 year.  Due to the risks of reoperative surgery she is currently being monitored closely.  Optimal blood pressure control is recommended.      However patient as complaining of worsening shortness of breath, so we repeated echo which was unchanged, aortic regurgitation moderate with normal LV size and function.   Personally I think her noted regurgitation is contributing to her symptoms, she has moderate aortic regurgitation gets worse when she is exerting with her activities.    Will follow her echocardiogram in 6 months.  Follows at [facility] now.     2.  Aortic regurgitation:  Moderate aortic regurgitation noted on echocardiogram has been ordered.  Is not a candidate for MRI because of her pacemaker leads.  I do not think a GUSTAVO is needed at this point, unless CT surgery at [facility] requests for it and will help in their decision making     2. Hypertension:  Amlodipine 5mg,   Cardizem 120 mg  Imdur 120 mg  lisinopril 5 will increase to 10 mg  Spironolactone 25mg  Sotalol 80mg BID    At home blood pressure is getting in the 90s, lack of amlodipine 5 mg.      3. Hyperlipidemia LDL>100, will increased lipitor to 40mgt.     4. sick sinus syndrome status post dual-chamber pacemaker, on sotalol for paroxysmal atrial fibrillation and remains in sinus rhythm.  Status post recent generator change in March 2021.    5.  Paroxysmal A. fib  Previously diagnosed, however recent pacemaker check showed that she was in normal sinus rhythm.  Has been on sotalol for rhythm control.  CHADSVASC is 4.   She has not been on anticoagulation, since she has a pacemaker and there is reliable source of there is no recurrence we will continue to hold off anti-coagulation. Also in the setting of her aortic  dilation, its ok to hold off AC.     6.  Coronary artery disease:  Cardiac cath with mild nonobstructive disease and congestive heart failure preserved ejection fraction:  Optimal blood pressure control as above.    7.  Shortness of breath:  Multifactorial from aortic regurgitation and congestive heart failure preserved ejection fraction:  Optimal blood pressure control as above.  Appointment with surgery tomorrow for consideration for aortic valve evaluation is in repair.      Prevention:  Patient's Body mass index is 31.27 kg/m². BMI is above normal parameters. Recommendations include: exercise counseling and nutrition counseling.      Abril Tere Avilez is not a smoker    AAA Screening:   Not needed            This document has been electronically signed by Marquita Weinberg MD on January 13, 2022 11:16 CST

## 2022-01-17 LAB
QT INTERVAL: 440 MS
QTC INTERVAL: 440 MS

## 2022-02-10 PROCEDURE — 93296 REM INTERROG EVL PM/IDS: CPT | Performed by: NURSE PRACTITIONER

## 2022-02-10 PROCEDURE — 93294 REM INTERROG EVL PM/LDLS PM: CPT | Performed by: NURSE PRACTITIONER

## 2022-03-10 ENCOUNTER — TELEPHONE (OUTPATIENT)
Dept: CARDIOLOGY | Facility: CLINIC | Age: 77
End: 2022-03-10

## 2022-03-10 LAB
BH CV ECHO MEAS - ACS: 1.8 CM
BH CV ECHO MEAS - AI DEC SLOPE: 291.5 CM/SEC^2
BH CV ECHO MEAS - AI MAX PG: 70.9 MMHG
BH CV ECHO MEAS - AI MAX VEL: 421 CM/SEC
BH CV ECHO MEAS - AI P1/2T: 423 MSEC
BH CV ECHO MEAS - AO MAX PG (FULL): 5.9 MMHG
BH CV ECHO MEAS - AO MAX PG: 9.7 MMHG
BH CV ECHO MEAS - AO MEAN PG (FULL): 4 MMHG
BH CV ECHO MEAS - AO MEAN PG: 6 MMHG
BH CV ECHO MEAS - AO ROOT AREA (BSA CORRECTED): 1.5
BH CV ECHO MEAS - AO ROOT AREA: 6.2 CM^2
BH CV ECHO MEAS - AO ROOT DIAM: 2.8 CM
BH CV ECHO MEAS - AO V2 MAX: 156 CM/SEC
BH CV ECHO MEAS - AO V2 MEAN: 111 CM/SEC
BH CV ECHO MEAS - AO V2 VTI: 41.6 CM
BH CV ECHO MEAS - ASC AORTA: 4.6 CM
BH CV ECHO MEAS - AVA(I,A): 2 CM^2
BH CV ECHO MEAS - AVA(I,D): 2 CM^2
BH CV ECHO MEAS - AVA(V,A): 2 CM^2
BH CV ECHO MEAS - AVA(V,D): 2 CM^2
BH CV ECHO MEAS - BSA(HAYCOCK): 2 M^2
BH CV ECHO MEAS - BSA: 1.9 M^2
BH CV ECHO MEAS - BZI_BMI: 31.2 KILOGRAMS/M^2
BH CV ECHO MEAS - BZI_METRIC_HEIGHT: 162.6 CM
BH CV ECHO MEAS - BZI_METRIC_WEIGHT: 82.6 KG
BH CV ECHO MEAS - EDV(CUBED): 99.3 ML
BH CV ECHO MEAS - EDV(MOD-SP4): 94.2 ML
BH CV ECHO MEAS - EDV(TEICH): 98.8 ML
BH CV ECHO MEAS - EF(CUBED): 67.6 %
BH CV ECHO MEAS - EF(TEICH): 59.2 %
BH CV ECHO MEAS - EPSS: 0.8 CM
BH CV ECHO MEAS - ESV(CUBED): 32.2 ML
BH CV ECHO MEAS - ESV(TEICH): 40.3 ML
BH CV ECHO MEAS - FS: 31.3 %
BH CV ECHO MEAS - IVS/LVPW: 1.4
BH CV ECHO MEAS - IVSD: 1.3 CM
BH CV ECHO MEAS - LA DIMENSION: 4.1 CM
BH CV ECHO MEAS - LA/AO: 1.5
BH CV ECHO MEAS - LV DIASTOLIC VOL/BSA (35-75): 50.1 ML/M^2
BH CV ECHO MEAS - LV MASS(C)D: 192.9 GRAMS
BH CV ECHO MEAS - LV MASS(C)DI: 102.6 GRAMS/M^2
BH CV ECHO MEAS - LV MAX PG: 3.8 MMHG
BH CV ECHO MEAS - LV MEAN PG: 2 MMHG
BH CV ECHO MEAS - LV V1 MAX: 97.9 CM/SEC
BH CV ECHO MEAS - LV V1 MEAN: 67.1 CM/SEC
BH CV ECHO MEAS - LV V1 VTI: 27.1 CM
BH CV ECHO MEAS - LVIDD: 4.6 CM
BH CV ECHO MEAS - LVIDS: 3.2 CM
BH CV ECHO MEAS - LVLD AP4: 6.8 CM
BH CV ECHO MEAS - LVOT AREA (M): 3.1 CM^2
BH CV ECHO MEAS - LVOT AREA: 3.1 CM^2
BH CV ECHO MEAS - LVOT DIAM: 2 CM
BH CV ECHO MEAS - LVPWD: 0.94 CM
BH CV ECHO MEAS - MR MAX PG: 82.4 MMHG
BH CV ECHO MEAS - MR MAX VEL: 454 CM/SEC
BH CV ECHO MEAS - MV A MAX VEL: 67.7 CM/SEC
BH CV ECHO MEAS - MV DEC SLOPE: 525 CM/SEC^2
BH CV ECHO MEAS - MV DEC TIME: 0.2 SEC
BH CV ECHO MEAS - MV E MAX VEL: 105 CM/SEC
BH CV ECHO MEAS - MV E/A: 1.6
BH CV ECHO MEAS - MV MAX PG: 3.5 MMHG
BH CV ECHO MEAS - MV MEAN PG: 2 MMHG
BH CV ECHO MEAS - MV P1/2T MAX VEL: 95.6 CM/SEC
BH CV ECHO MEAS - MV P1/2T: 53.3 MSEC
BH CV ECHO MEAS - MV V2 MAX: 93.8 CM/SEC
BH CV ECHO MEAS - MV V2 MEAN: 59.8 CM/SEC
BH CV ECHO MEAS - MV V2 VTI: 28.7 CM
BH CV ECHO MEAS - MVA P1/2T LCG: 2.3 CM^2
BH CV ECHO MEAS - MVA(P1/2T): 4.1 CM^2
BH CV ECHO MEAS - MVA(VTI): 3 CM^2
BH CV ECHO MEAS - PA MAX PG (FULL): 0.76 MMHG
BH CV ECHO MEAS - PA MAX PG: 3.2 MMHG
BH CV ECHO MEAS - PA V2 MAX: 89.6 CM/SEC
BH CV ECHO MEAS - PI END-D VEL: 132 CM/SEC
BH CV ECHO MEAS - RV MAX PG: 2.5 MMHG
BH CV ECHO MEAS - RV MEAN PG: 1 MMHG
BH CV ECHO MEAS - RV V1 MAX: 78.3 CM/SEC
BH CV ECHO MEAS - RV V1 MEAN: 56.1 CM/SEC
BH CV ECHO MEAS - RV V1 VTI: 17.1 CM
BH CV ECHO MEAS - RVDD: 3.5 CM
BH CV ECHO MEAS - SI(AO): 136.3 ML/M^2
BH CV ECHO MEAS - SI(CUBED): 35.7 ML/M^2
BH CV ECHO MEAS - SI(LVOT): 45.3 ML/M^2
BH CV ECHO MEAS - SI(TEICH): 31.1 ML/M^2
BH CV ECHO MEAS - SV(AO): 256.2 ML
BH CV ECHO MEAS - SV(CUBED): 67.1 ML
BH CV ECHO MEAS - SV(LVOT): 85.1 ML
BH CV ECHO MEAS - SV(TEICH): 58.5 ML

## 2022-03-10 NOTE — TELEPHONE ENCOUNTER
Attempted to contact patient regarding echo results. Voicemail left asking her to call the office.

## 2022-03-11 ENCOUNTER — TELEPHONE (OUTPATIENT)
Dept: CARDIOLOGY | Facility: CLINIC | Age: 77
End: 2022-03-11

## 2022-03-11 NOTE — TELEPHONE ENCOUNTER
Marquita Weinberg MD Oldham, Donna L, MA  No new changes   Patient contacted regarding echo results per dr. Weinberg.

## 2022-05-06 ENCOUNTER — TELEPHONE (OUTPATIENT)
Dept: CARDIOLOGY | Facility: CLINIC | Age: 77
End: 2022-05-06

## 2022-05-06 ENCOUNTER — OFFICE VISIT (OUTPATIENT)
Dept: CARDIOLOGY | Facility: CLINIC | Age: 77
End: 2022-05-06

## 2022-05-06 VITALS
SYSTOLIC BLOOD PRESSURE: 146 MMHG | HEIGHT: 64 IN | OXYGEN SATURATION: 99 % | HEART RATE: 77 BPM | DIASTOLIC BLOOD PRESSURE: 82 MMHG | WEIGHT: 186 LBS | BODY MASS INDEX: 31.76 KG/M2

## 2022-05-06 DIAGNOSIS — R06.02 SHORTNESS OF BREATH: Primary | ICD-10-CM

## 2022-05-06 PROCEDURE — 93000 ELECTROCARDIOGRAM COMPLETE: CPT | Performed by: INTERNAL MEDICINE

## 2022-05-06 PROCEDURE — 99214 OFFICE O/P EST MOD 30 MIN: CPT | Performed by: INTERNAL MEDICINE

## 2022-05-06 RX ORDER — FUROSEMIDE 40 MG/1
40 TABLET ORAL 2 TIMES DAILY
Qty: 90 TABLET | Refills: 3 | Status: SHIPPED | OUTPATIENT
Start: 2022-05-06 | End: 2022-05-12

## 2022-05-06 NOTE — PROGRESS NOTES
[facility] Cardiology  OFFICE NOTE    Cardiovascular Medicine  Marquita Weinberg M.D., RPVI         No referring provider defined for this encounter.    Thank you for asking me to see Abril Avilez for f/up.    Atrial Fibrillation  Past medical history includes atrial fibrillation.     This is a 77 y.o. female with:  1. SSS (sick sinus syndrome) (CMS/HCC)    2. Essential hypertension    3. Pure hypercholesterolemia    4. Paroxysmal atrial fibrillation (CMS/HCC)          Chief complaint -Follow-up atrial fibrillation        History of present Illness- 77 y.o.-year-old lady with history of ascending aortic aneurysm repair in 2010. patient presented for scheduled out patient echo 2019 to assess her known aortic regurgitation and previous history of aortic repairt.  Possible dissection noted in aorta on echo. Patient sent for CTA of chest. Was noted to have possible dissection and intramural thrombus. I consulted Dr. Cotter in the setting of her previous aortic repair. Dr. Cotter reviewed the films and discussed with Dr. Mack at [facility]. Patient was asymptomatic, per Dr. Cotter's recommendations patient was stable for discharged and to f/up with Dr. Mack    However patient worsening shortness of breath over the last 1 year, also an episode of chest pain so she underwent a CTA coronary angiogram which showed potential obstructive disease in the LAD followed by cardiac cath which was without any evidence of obstructive coronary artery disease in the LAD.    Repeat echocardiogram continues to show moderate aortic regurgitation.  Since her symptoms of shortness of breath were getting worse referred her back to [facility] for consideration for aortic valve repair since that is contributing mostly to her symptoms of shortness of breath.    Patient was seen at [facility].,  She had a repeat right and left heart cath, coronaries with nonobstructive disease, she was found to have moderate aortic regurg on  "chocardiogram and filling pressures were reported to be on the higher side.  She was started on Lasix.    Patient was also referred to a cardiologist at [facility].  An MRI was requested for further evaluation of her to \"but her pacemaker leads were not compatible.  There is also discussion of the GUSTAVO that has not been performed.    She has not seen Dr. Mack in a while though.    Patient reported she has been having worsening shortness of breath over the last 1 week.  Has gained 3 to 4 pounds and has trace lower extremity swelling.  She has not been taking her Lasix.      Review of Systems - ROS  Constitution: Negative for weakness, weight gain and weight loss.   HENT: Negative for congestion.    Eyes: Negative for blurred vision.   Cardiovascular: As mentioned above  Respiratory: Negative for cough and hemoptysis.    Endocrine: Negative for polydipsia and polyuria.   Hematologic/Lymphatic: Negative for bleeding problem. Does not bruise/bleed easily.   Skin: Negative for flushing.   Musculoskeletal: Negative for neck pain and stiffness.   Gastrointestinal: Negative for abdominal pain, diarrhea, jaundice, melena, nausea and vomiting.   Genitourinary: Negative for dysuria and hematuria.   Neurological: Negative for dizziness, focal weakness and numbness.   Psychiatric/Behavioral: Negative for altered mental status and depression.          All other systems were reviewed and were negative.    family history includes Heart disease in her father and mother.     reports that she has never smoked. She has never used smokeless tobacco. She reports that she does not drink alcohol and does not use drugs.    Allergies   Allergen Reactions   • Hydroxyzine Headache   • Ampicillin Rash   • Milk-Related Compounds GI Intolerance   • Penicillins Rash   • Pravastatin Mental Status Change   • Sulfa Antibiotics Hives         Current Outpatient Medications:   •  amLODIPine (NORVASC) 5 MG tablet, Take 1 tablet by mouth Daily., " "Disp: 30 tablet, Rfl: 10  •  aspirin 81 MG EC tablet, Take 81 mg by mouth 2 (Two) Times a Day., Disp: , Rfl:   •  atorvastatin (LIPITOR) 40 MG tablet, Take 40 mg by mouth Every Night., Disp: , Rfl:   •  dilTIAZem CD (CARDIZEM CD) 120 MG 24 hr capsule, Take 1 capsule by mouth once daily, Disp: 90 capsule, Rfl: 1  •  isosorbide mononitrate (IMDUR) 120 MG 24 hr tablet, Take 1 tablet by mouth once daily, Disp: 90 tablet, Rfl: 6  •  lisinopril (PRINIVIL,ZESTRIL) 10 MG tablet, Take 1 tablet by mouth Daily., Disp: 30 tablet, Rfl: 4  •  nitroglycerin (NITROSTAT) 0.4 MG SL tablet, Place 0.4 mg under the tongue Every 5 (Five) Minutes As Needed. do not exceed a total of 3 doses in 15 minutes, Disp: , Rfl:   •  sotalol (BETAPACE) 80 MG tablet, Take 1 tablet by mouth twice daily, Disp: 180 tablet, Rfl: 3  •  spironolactone (ALDACTONE) 25 MG tablet, Take 25 mg by mouth Daily., Disp: , Rfl:   •  furosemide (LASIX) 40 MG tablet, Take 40 mg by mouth As Needed., Disp: , Rfl:     Physical Exam:  Vitals:    05/06/22 0908   BP: 146/82   BP Location: Left arm   Patient Position: Sitting   Cuff Size: Adult   Pulse: 77   SpO2: 99%   Weight: 84.4 kg (186 lb)   Height: 162.6 cm (64\")       Current Pain Level: none  Pulse Ox: Normal  on room air  General: alert, appears stated age and cooperative     Body Habitus: well-nourished    HEENT: Head: Normocephalic, no lesions, without obvious abnormality. No arcus senilis, xanthelasma or xanthomas.    Neuro: alert, oriented x3  Pulses: 2+ and symmetric  JVP: Volume/Pulsation: Normal.  Normal waveforms.   Appropriate inspiratory decrease.  No Kussmaul's. No Gerardo's.   Carotid Exam: no bruit normal pulsation bilaterally   Carotid Volume: normal.     Respirations: no increased work of breathing   Chest:  Normal    Pulmonary:Normal   Precordium: Normal impulses. P2 is not palpable.  RV Heave: absent  LV Heave: absent  [place]:  normal size and placement  Palpable S4: absent.  Heart rate: normal  "   Heart Rhythm: regular     Heart Sounds: S1: normal  S2: normal  S3: absent   S4: absent  Opening Snap: absent   Diastolic murmur audible in aortic area.   Pericardial Rub:  Absent: .    Abdomen:   Appearance: normal .  Palpation: Soft, non-tender to palpation, bowel sounds positive in all four quadrants; no guarding or rebound tenderness  Extremity: no edema.   LE Skin: no rashes  LE Hair:  normal  LE Pulses: well perfused with normal pulses in the distal extremities  Pallor on elevation: Absent. Rubor on dependency: None      DATA REVIEWED:     EKG. I personally reviewed and interpreted the EKG.  venricular paced rhythm    ECG/EMG Results (all)     None        ---------------------------------------------------  TTE/GUSTAVO:  Results for orders placed in visit on 01/13/22    Adult Transthoracic Echo Complete W/ Cont if Necessary Per Protocol    Interpretation Summary  · Left ventricular ejection fraction appears to be 56 - 60%. Left ventricular systolic function is normal.  · Left ventricular diastolic function is consistent with (grade II w/high LAP) pseudonormalization.  · Left ventricular wall thickness is consistent with mild concentric hypertrophy.  · Moderate aortic valve regurgitation is present.  · Left atrial volume is moderately increased.  · Aneurysmal dilation of the proximal aorta is present. There is an unknown type of proximal aorta graft noted. Ascending aorta measures 4.5cm in diameter (Unchangde from before). Linear echogenic density noted in the ascending aorta which appears unchanged from prior echo. In the setting of previous ascending aortic aneurysm repair most likely related to post-operative changes. Color doppler did not reveal any flow into the space beyond the echogenic density.    No significant interval changes noted.        --------------------------------------------------------------------------------------------------  LABS:     The CVD Risk score (Pricila et al., 2008) failed to  calculate for the following reasons:    The 2008 CVD risk score is only valid for ages 30 to 74    The patient has a prior MI, stroke, CHF, or peripheral vascular disease diagnosis         Lab Results   Component Value Date    GLUCOSE 94 10/08/2021    BUN 23 10/08/2021    CREATININE 0.90 10/08/2021    EGFRIFNONA 61 10/08/2021    BCR 25.6 (H) 10/08/2021    K 4.4 10/08/2021    CO2 25.0 10/08/2021    CALCIUM 9.4 10/08/2021    ALBUMIN 4.60 06/08/2021    AST 15 06/08/2021    ALT 12 06/08/2021     Lab Results   Component Value Date    WBC 7.50 02/04/2021    HGB 11.3 (L) 02/04/2021    HCT 32.5 (L) 02/04/2021    MCV 93.9 02/04/2021     02/04/2021     Lab Results   Component Value Date    CHOL 185 11/03/2020    CHLPL 143 06/17/2016    TRIG 87 11/03/2020    HDL 48 11/03/2020     (H) 11/03/2020     Lab Results   Component Value Date    TSH 1.600 10/13/2020     No results found for: CKTOTAL, CKMB, CKMBINDEX, TROPONINI, TROPONINT  Lab Results   Component Value Date    HGBA1C 5.8 (H) 06/17/2016     No results found for: DDIMER  Lab Results   Component Value Date    ALT 12 06/08/2021     Lab Results   Component Value Date    HGBA1C 5.8 (H) 06/17/2016     Lab Results   Component Value Date    CREATININE 0.90 10/08/2021     No results found for: IRON, TIBC, FERRITIN  Lab Results   Component Value Date    INR 0.98 02/04/2021    INR 0.98 12/16/2020    INR 0.95 12/01/2020    PROTIME 13.4 02/04/2021    PROTIME 13.4 12/16/2020    PROTIME 13.0 12/01/2020       Assessment/Plan     1. Status post ascending aortic aneurysm repair:  Concerning findings on echo and CT as above.  Moderate aortic regurgitation.  She is following with thoracic surgery at [place].  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  Per imaging with surgery aneurysm has remained stable in size over the last 1 year.  Due to the risks of reoperative surgery she is currently being monitored  closely.  Optimal blood pressure control is recommended.      However patient as complaining of worsening shortness of breath, so we repeated echo which was unchanged, aortic regurgitation moderate with normal LV size and function.   Personally I think her noted regurgitation is contributing to her symptoms, she has moderate aortic regurgitation gets worse when she is exerting with her activities.    Patient appears fluid overloaded.  I am restarting her Lasix 40 mg twice daily over the weekend, will check BMP on Monday.  If no improvement in symptoms patient was advised to come to the ER.  We will reassess her next week.      2.  Aortic regurgitation:  Moderate aortic regurgitation noted on echocardiogram has been ordered.  Is not a candidate for MRI because of her pacemaker leads.  I do not think a GUSTAVO is needed at this point, unless CT surgery at [facility] requests for it and will help in their decision making     2. Hypertension:  Amlodipine 5mg,   Cardizem 120 mg  Imdur 120 mg  lisinopril 10 mg  Spironolactone 25mg  Sotalol 80mg BID    3. Hyperlipidemia LDL>100, will increased lipitor to 40mgt.     4. sick sinus syndrome status post dual-chamber pacemaker, on sotalol for paroxysmal atrial fibrillation and remains in sinus rhythm.  Status post recent generator change in March 2021.    5.  Paroxysmal A. fib  Previously diagnosed, however recent pacemaker check showed that she was in normal sinus rhythm.  Has been on sotalol for rhythm control.  CHADSVASC is 4.   She has not been on anticoagulation, since she has a pacemaker and there is reliable source of there is no recurrence we will continue to hold off anti-coagulation. Also in the setting of her aortic dilation, its ok to hold off AC.     6.  Coronary artery disease:  Cardiac cath with mild nonobstructive disease and congestive heart failure preserved ejection fraction:  Optimal blood pressure control as above.    7.  Shortness of breath:  Multifactorial from  aortic regurgitation and congestive heart failure preserved ejection fraction:  Optimal blood pressure control as above.  Starting her on diuretics as above with close follow-up.      Prevention:  Patient's Body mass index is 31.93 kg/m². BMI is above normal parameters. Recommendations include: exercise counseling and nutrition counseling.      Abril Carrazna Congregation is not a smoker    AAA Screening:   Not needed            This document has been electronically signed by Marquita Weinberg MD on May 6, 2022 09:20 CDT

## 2022-05-08 LAB
QT INTERVAL: 194 MS
QTC INTERVAL: 219 MS

## 2022-05-12 ENCOUNTER — TELEPHONE (OUTPATIENT)
Dept: CARDIOLOGY | Facility: CLINIC | Age: 77
End: 2022-05-12

## 2022-05-12 ENCOUNTER — OFFICE VISIT (OUTPATIENT)
Dept: CARDIOLOGY | Facility: CLINIC | Age: 77
End: 2022-05-12

## 2022-05-12 ENCOUNTER — LAB (OUTPATIENT)
Dept: LAB | Facility: OTHER | Age: 77
End: 2022-05-12

## 2022-05-12 VITALS
SYSTOLIC BLOOD PRESSURE: 160 MMHG | HEIGHT: 64 IN | DIASTOLIC BLOOD PRESSURE: 66 MMHG | OXYGEN SATURATION: 98 % | WEIGHT: 184 LBS | BODY MASS INDEX: 31.41 KG/M2 | HEART RATE: 87 BPM

## 2022-05-12 DIAGNOSIS — Z86.39 H/O HYPERKALEMIA: ICD-10-CM

## 2022-05-12 DIAGNOSIS — I48.0 PAROXYSMAL ATRIAL FIBRILLATION: Primary | ICD-10-CM

## 2022-05-12 LAB
ALBUMIN SERPL-MCNC: 4.2 G/DL (ref 3.5–5)
ALBUMIN/GLOB SERPL: 1.4 G/DL (ref 1.1–1.8)
ALP SERPL-CCNC: 86 U/L (ref 38–126)
ALT SERPL W P-5'-P-CCNC: 28 U/L
ANION GAP SERPL CALCULATED.3IONS-SCNC: 4 MMOL/L (ref 5–15)
AST SERPL-CCNC: 28 U/L (ref 14–36)
BILIRUB SERPL-MCNC: 1.3 MG/DL (ref 0.2–1.3)
BUN SERPL-MCNC: 20 MG/DL (ref 7–23)
BUN/CREAT SERPL: 24.7 (ref 7–25)
CALCIUM SPEC-SCNC: 9.4 MG/DL (ref 8.4–10.2)
CHLORIDE SERPL-SCNC: 106 MMOL/L (ref 101–112)
CO2 SERPL-SCNC: 29 MMOL/L (ref 22–30)
CREAT SERPL-MCNC: 0.81 MG/DL (ref 0.52–1.04)
EGFRCR SERPLBLD CKD-EPI 2021: 74.4 ML/MIN/1.73
GLOBULIN UR ELPH-MCNC: 3 GM/DL (ref 2.3–3.5)
GLUCOSE SERPL-MCNC: 101 MG/DL (ref 70–99)
POTASSIUM SERPL-SCNC: 4.2 MMOL/L (ref 3.4–5)
PROT SERPL-MCNC: 7.2 G/DL (ref 6.3–8.6)
SODIUM SERPL-SCNC: 139 MMOL/L (ref 137–145)

## 2022-05-12 PROCEDURE — 93294 REM INTERROG EVL PM/LDLS PM: CPT | Performed by: NURSE PRACTITIONER

## 2022-05-12 PROCEDURE — 99213 OFFICE O/P EST LOW 20 MIN: CPT | Performed by: INTERNAL MEDICINE

## 2022-05-12 PROCEDURE — 80053 COMPREHEN METABOLIC PANEL: CPT | Performed by: INTERNAL MEDICINE

## 2022-05-12 PROCEDURE — 36415 COLL VENOUS BLD VENIPUNCTURE: CPT | Performed by: INTERNAL MEDICINE

## 2022-05-12 PROCEDURE — 93296 REM INTERROG EVL PM/IDS: CPT | Performed by: NURSE PRACTITIONER

## 2022-05-12 RX ORDER — FUROSEMIDE 40 MG/1
40 TABLET ORAL DAILY
Qty: 90 TABLET | Refills: 3 | Status: SHIPPED | OUTPATIENT
Start: 2022-05-12

## 2022-05-12 RX ORDER — AMLODIPINE BESYLATE 10 MG/1
10 TABLET ORAL DAILY
Qty: 90 TABLET | Refills: 3 | Status: SHIPPED | OUTPATIENT
Start: 2022-05-12

## 2022-05-12 NOTE — TELEPHONE ENCOUNTER
Marquita Weinberg MD [place], Donna L, MA Cr and tres lieberman, Continue lasix daily   Patient contacted with lab results per dr. Weinberg. The patient was instructed she may continue lasix daily.

## 2022-05-12 NOTE — PROGRESS NOTES
[facility]  OFFICE NOTE    Cardiovascular Medicine  Marquita Weinberg M.D., RPVI         No referring provider defined for this encounter.    Thank you for asking me to see Abril Avilez for f/up.    Atrial Fibrillation  Past medical history includes atrial fibrillation.     This is a 77 y.o. female with:  1. SSS (sick sinus syndrome) (CMS/HCC)    2. Essential hypertension    3. Pure hypercholesterolemia    4. Paroxysmal atrial fibrillation (CMS/HCC)          Chief complaint -Follow-up atrial fibrillation        History of present Illness- 77 y.o.-year-old lady with history of ascending aortic aneurysm repair in 2010. patient presented for scheduled out patient echo 2019 to assess her known aortic regurgitation and previous history of aortic repairt.  Possible dissection noted in aorta on echo. Patient sent for CTA of chest. Was noted to have possible dissection and intramural thrombus. I consulted Dr. Cotter in the setting of her previous aortic repair. Dr. Cotter reviewed the films and discussed with Dr. Mack at [facility]. Patient was asymptomatic, per Dr. Cotter's recommendations patient was stable for discharged and to f/up with Dr. Mack    However patient worsening shortness of breath over the last 1 year, also an episode of chest pain so she underwent a CTA coronary angiogram which showed potential obstructive disease in the LAD followed by cardiac cath which was without any evidence of obstructive coronary artery disease in the LAD.    Repeat echocardiogram continues to show moderate aortic regurgitation.  Since her symptoms of shortness of breath were getting worse referred her back to [facility] for consideration for aortic valve repair since that is contributing mostly to her symptoms of shortness of breath.    Patient was seen at [facility].,  She had a repeat right and left heart cath, coronaries with nonobstructive disease, she was found to have moderate aortic regurg on  "chocardiogram and filling pressures were reported to be on the higher side.  She was started on Lasix.    Patient was also referred to a cardiologist at [facility].  An MRI was requested for further evaluation of her to \"but her pacemaker leads were not compatible.  There is also discussion of the GUSTAVO that has not been performed.    She has not seen Dr. Mack in a while though.    Patient reported she has been having worsening shortness of breath over the last 1 week.  Has gained 3 to 4 pounds and has trace lower extremity swelling.  She has not been taking her Lasix.    05/12/2022:  Reported improvement in shortness of breath with her Lasix.  Has lost 2 pounds.  Blood pressure continues to stay elevated today.  But had significant improvement in her symptoms.      Review of Systems - ROS  Constitution: Negative for weakness, weight gain and weight loss.   HENT: Negative for congestion.    Eyes: Negative for blurred vision.   Cardiovascular: As mentioned above  Respiratory: Negative for cough and hemoptysis.    Endocrine: Negative for polydipsia and polyuria.   Hematologic/Lymphatic: Negative for bleeding problem. Does not bruise/bleed easily.   Skin: Negative for flushing.   Musculoskeletal: Negative for neck pain and stiffness.   Gastrointestinal: Negative for abdominal pain, diarrhea, jaundice, melena, nausea and vomiting.   Genitourinary: Negative for dysuria and hematuria.   Neurological: Negative for dizziness, focal weakness and numbness.   Psychiatric/Behavioral: Negative for altered mental status and depression.          All other systems were reviewed and were negative.    family history includes Heart disease in her father and mother.     reports that she has never smoked. She has never used smokeless tobacco. She reports that she does not drink alcohol and does not use drugs.    Allergies   Allergen Reactions   • Hydroxyzine Headache   • Ampicillin Rash   • Milk-Related Compounds GI Intolerance   • " "Penicillins Rash   • Pravastatin Mental Status Change   • Sulfa Antibiotics Hives         Current Outpatient Medications:   •  amLODIPine (NORVASC) 5 MG tablet, Take 1 tablet by mouth Daily., Disp: 30 tablet, Rfl: 10  •  aspirin 81 MG EC tablet, Take 81 mg by mouth 2 (Two) Times a Day., Disp: , Rfl:   •  atorvastatin (LIPITOR) 40 MG tablet, Take 40 mg by mouth Every Night., Disp: , Rfl:   •  dilTIAZem CD (CARDIZEM CD) 120 MG 24 hr capsule, Take 1 capsule by mouth once daily, Disp: 90 capsule, Rfl: 1  •  furosemide (LASIX) 40 MG tablet, Take 1 tablet by mouth 2 (Two) Times a Day. (Patient taking differently: Take 40 mg by mouth Daily.), Disp: 90 tablet, Rfl: 3  •  isosorbide mononitrate (IMDUR) 120 MG 24 hr tablet, Take 1 tablet by mouth once daily, Disp: 90 tablet, Rfl: 6  •  lisinopril (PRINIVIL,ZESTRIL) 10 MG tablet, Take 1 tablet by mouth Daily., Disp: 30 tablet, Rfl: 4  •  nitroglycerin (NITROSTAT) 0.4 MG SL tablet, Place 0.4 mg under the tongue Every 5 (Five) Minutes As Needed. do not exceed a total of 3 doses in 15 minutes, Disp: , Rfl:   •  sotalol (BETAPACE) 80 MG tablet, Take 1 tablet by mouth twice daily, Disp: 180 tablet, Rfl: 3  •  spironolactone (ALDACTONE) 25 MG tablet, Take 25 mg by mouth Daily., Disp: , Rfl:     Physical Exam:  Vitals:    05/12/22 0906   BP: 160/66   BP Location: Left arm   Patient Position: Sitting   Cuff Size: Adult   Pulse: 87   SpO2: 98%   Weight: 83.5 kg (184 lb)   Height: 162.6 cm (64\")       Current Pain Level: none  Pulse Ox: Normal  on room air  General: alert, appears stated age and cooperative     Body Habitus: well-nourished    HEENT: Head: Normocephalic, no lesions, without obvious abnormality. No arcus senilis, xanthelasma or xanthomas.    Neuro: alert, oriented x3  Pulses: 2+ and symmetric  JVP: Volume/Pulsation: Normal.  Normal waveforms.   Appropriate inspiratory decrease.  No Kussmaul's. No Gerardo's.   Carotid Exam: no bruit normal pulsation bilaterally   Carotid " Volume: normal.     Respirations: no increased work of breathing   Chest:  Normal    Pulmonary:Normal   Precordium: Normal impulses. P2 is not palpable.  RV Heave: absent  LV Heave: absent  [place]:  normal size and placement  Palpable S4: absent.  Heart rate: normal    Heart Rhythm: regular     Heart Sounds: S1: normal  S2: normal  S3: absent   S4: absent  Opening Snap: absent   Diastolic murmur audible in aortic area.   Pericardial Rub:  Absent: .    Abdomen:   Appearance: normal .  Palpation: Soft, non-tender to palpation, bowel sounds positive in all four quadrants; no guarding or rebound tenderness  Extremity: no edema.   LE Skin: no rashes  LE Hair:  normal  LE Pulses: well perfused with normal pulses in the distal extremities  Pallor on elevation: Absent. Rubor on dependency: None      DATA REVIEWED:     EKG. I personally reviewed and interpreted the EKG.  venricular paced rhythm    ECG/EMG Results (all)     None        ---------------------------------------------------  TTE/GUSTAVO:  Results for orders placed in visit on 01/13/22    Adult Transthoracic Echo Complete W/ Cont if Necessary Per Protocol    Interpretation Summary  · Left ventricular ejection fraction appears to be 56 - 60%. Left ventricular systolic function is normal.  · Left ventricular diastolic function is consistent with (grade II w/high LAP) pseudonormalization.  · Left ventricular wall thickness is consistent with mild concentric hypertrophy.  · Moderate aortic valve regurgitation is present.  · Left atrial volume is moderately increased.  · Aneurysmal dilation of the proximal aorta is present. There is an unknown type of proximal aorta graft noted. Ascending aorta measures 4.5cm in diameter (Unchangde from before). Linear echogenic density noted in the ascending aorta which appears unchanged from prior echo. In the setting of previous ascending aortic aneurysm repair most likely related to post-operative changes. Color doppler did not reveal  any flow into the space beyond the echogenic density.    No significant interval changes noted.        --------------------------------------------------------------------------------------------------  LABS:     The CVD Risk score (Pricila et al., 2008) failed to calculate for the following reasons:    The 2008 CVD risk score is only valid for ages 30 to 74    The patient has a prior MI, stroke, CHF, or peripheral vascular disease diagnosis         Lab Results   Component Value Date    GLUCOSE 94 10/08/2021    BUN 23 10/08/2021    CREATININE 0.90 10/08/2021    EGFRIFNONA 61 10/08/2021    BCR 25.6 (H) 10/08/2021    K 4.4 10/08/2021    CO2 25.0 10/08/2021    CALCIUM 9.4 10/08/2021    ALBUMIN 4.60 06/08/2021    AST 15 06/08/2021    ALT 12 06/08/2021     Lab Results   Component Value Date    WBC 7.50 02/04/2021    HGB 11.3 (L) 02/04/2021    HCT 32.5 (L) 02/04/2021    MCV 93.9 02/04/2021     02/04/2021     Lab Results   Component Value Date    CHOL 185 11/03/2020    CHLPL 143 06/17/2016    TRIG 87 11/03/2020    HDL 48 11/03/2020     (H) 11/03/2020     Lab Results   Component Value Date    TSH 1.600 10/13/2020     No results found for: CKTOTAL, CKMB, CKMBINDEX, TROPONINI, TROPONINT  Lab Results   Component Value Date    HGBA1C 5.8 (H) 06/17/2016     No results found for: DDIMER  Lab Results   Component Value Date    ALT 12 06/08/2021     Lab Results   Component Value Date    HGBA1C 5.8 (H) 06/17/2016     Lab Results   Component Value Date    CREATININE 0.90 10/08/2021     No results found for: IRON, TIBC, FERRITIN  Lab Results   Component Value Date    INR 0.98 02/04/2021    INR 0.98 12/16/2020    INR 0.95 12/01/2020    PROTIME 13.4 02/04/2021    PROTIME 13.4 12/16/2020    PROTIME 13.0 12/01/2020       Assessment/Plan     1. Status post ascending aortic aneurysm repair:  Concerning findings on echo and CT as above.  Moderate aortic regurgitation.  She is following with thoracic surgery at [place].   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  Per imaging with surgery aneurysm has remained stable in size over the last 1 year.  Due to the risks of reoperative surgery she is currently being monitored closely.  Optimal blood pressure control is recommended.      However patient as complaining of worsening shortness of breath, so we repeated echo which was unchanged, aortic regurgitation moderate with normal LV size and function.   Personally I think her noted regurgitation is contributing to her symptoms, she has moderate aortic regurgitation gets worse when she is exerting with her activities.    Patient appeared fluid overloaded last week.  I started her on Lasix twice daily with improvement in her symptoms.  We will cut back to once a day.  Checking BMP today.  Blood pressure is elevated, currently uptitrating amlodipine to 10 mg.      2.  Aortic regurgitation:  Moderate aortic regurgitation noted on echocardiogram has been ordered.  Is not a candidate for MRI because of her pacemaker leads.  I do not think a GUSTAVO is needed at this point, unless CT surgery at [facility] requests for it and will help in their decision making     2. Hypertension:  Amlodipine 5mg, titrate to 10 mg  Cardizem 120 mg  Imdur 120 mg  lisinopril 10 mg  Spironolactone 25mg  Sotalol 80mg BID    Target blood pressure 120 x 80.    3. Hyperlipidemia LDL>100, will increased lipitor to 40mgt.     4. sick sinus syndrome status post dual-chamber pacemaker, on sotalol for paroxysmal atrial fibrillation and remains in sinus rhythm.  Status post recent generator change in March 2021.    5.  Paroxysmal A. fib  Previously diagnosed, however recent pacemaker check showed that she was in normal sinus rhythm.  Has been on sotalol for rhythm control.  CHADSVASC is 4.   She has not been on anticoagulation, since she has a pacemaker and there is reliable source of there is no recurrence we will  continue to hold off anti-coagulation. Also in the setting of her aortic dilation, its ok to hold off AC.     6.  Coronary artery disease:  Cardiac cath with mild nonobstructive disease and congestive heart failure preserved ejection fraction:  Optimal blood pressure control as above.    7.  Shortness of breath:  Multifactorial from aortic regurgitation and congestive heart failure preserved ejection fraction:  Optimal blood pressure control as above.  Starting her on diuretics as above with close follow-up.      Prevention:  Patient's Body mass index is 31.58 kg/m². BMI is above normal parameters. Recommendations include: exercise counseling and nutrition counseling.      Abril Avilez is not a smoker    AAA Screening:   Not needed            This document has been electronically signed by Marquita Weinberg MD on May 12, 2022 09:14 CDT

## 2022-05-19 ENCOUNTER — TELEPHONE (OUTPATIENT)
Dept: CARDIOLOGY | Facility: CLINIC | Age: 77
End: 2022-05-19

## 2022-05-23 ENCOUNTER — TELEPHONE (OUTPATIENT)
Dept: CARDIOLOGY | Facility: CLINIC | Age: 77
End: 2022-05-23

## 2022-05-23 DIAGNOSIS — I35.1 AORTIC VALVE INSUFFICIENCY, ETIOLOGY OF CARDIAC VALVE DISEASE UNSPECIFIED: Primary | ICD-10-CM

## 2022-05-23 RX ORDER — SOTALOL HYDROCHLORIDE 80 MG/1
TABLET ORAL
Qty: 180 TABLET | Refills: 1 | Status: SHIPPED | OUTPATIENT
Start: 2022-05-23 | End: 2022-11-28

## 2022-06-09 ENCOUNTER — TELEPHONE (OUTPATIENT)
Dept: CARDIOLOGY | Facility: CLINIC | Age: 77
End: 2022-06-09

## 2022-06-09 RX ORDER — DILTIAZEM HYDROCHLORIDE 120 MG/1
CAPSULE, COATED, EXTENDED RELEASE ORAL
Qty: 90 CAPSULE | Refills: 1 | Status: SHIPPED | OUTPATIENT
Start: 2022-06-09 | End: 2022-12-09

## 2022-06-09 NOTE — TELEPHONE ENCOUNTER
Dr. Hardy's office called and requested the patient be scheduled for a right and left heart cath. Dr. Weinberg will contact dr. Hardy regarding this request.

## 2022-06-14 ENCOUNTER — TELEPHONE (OUTPATIENT)
Dept: CARDIOLOGY | Facility: CLINIC | Age: 77
End: 2022-06-14

## 2022-06-17 RX ORDER — LISINOPRIL 10 MG/1
TABLET ORAL
Qty: 90 TABLET | Refills: 3 | Status: SHIPPED | OUTPATIENT
Start: 2022-06-17

## 2022-07-07 ENCOUNTER — OFFICE VISIT (OUTPATIENT)
Dept: CARDIOLOGY | Facility: CLINIC | Age: 77
End: 2022-07-07

## 2022-07-07 VITALS
DIASTOLIC BLOOD PRESSURE: 70 MMHG | BODY MASS INDEX: 31.24 KG/M2 | WEIGHT: 183 LBS | SYSTOLIC BLOOD PRESSURE: 146 MMHG | HEIGHT: 64 IN | OXYGEN SATURATION: 97 %

## 2022-07-07 DIAGNOSIS — I48.0 PAROXYSMAL ATRIAL FIBRILLATION: Primary | ICD-10-CM

## 2022-07-07 PROCEDURE — 93000 ELECTROCARDIOGRAM COMPLETE: CPT | Performed by: INTERNAL MEDICINE

## 2022-07-07 PROCEDURE — 99214 OFFICE O/P EST MOD 30 MIN: CPT | Performed by: INTERNAL MEDICINE

## 2022-07-07 NOTE — PROGRESS NOTES
[facility] Cardiology  OFFICE NOTE    Cardiovascular Medicine  Marquita Weinberg M.D., RPVI         No referring provider defined for this encounter.    Thank you for asking me to see Abril Avilez for f/up.    Atrial Fibrillation  Past medical history includes atrial fibrillation.     This is a 77 y.o. female with:  1. SSS (sick sinus syndrome) (CMS/HCC)    2. Essential hypertension    3. Pure hypercholesterolemia    4. Paroxysmal atrial fibrillation (CMS/HCC)    5.  Aortic regurgitation  6.  Ascending arctic aneurysm status postrepair     Chief complaint -Follow-up atrial fibrillation        History of present Illness- 77 y.o.-year-old lady with history of ascending aortic aneurysm repair in 2010. patient presented for scheduled out patient echo 2019 to assess her known aortic regurgitation and previous history of aortic repairt.  Possible dissection noted in aorta on echo. Patient sent for CTA of chest. Was noted to have possible dissection and intramural thrombus. I consulted Dr. Cotter in the setting of her previous aortic repair. Dr. Cotter reviewed the films and discussed with Dr. Mack at [facility]. Patient was asymptomatic, per Dr. Cotter's recommendations patient was stable for discharged and to f/up with Dr. Mack    However patient worsening shortness of breath over the last 1 year, also an episode of chest pain so she underwent a CTA coronary angiogram which showed potential obstructive disease in the LAD followed by cardiac cath which was without any evidence of obstructive coronary artery disease in the LAD.    Repeat echocardiogram continues to show moderate aortic regurgitation.  Since her symptoms of shortness of breath were getting worse referred her back to [facility] for consideration for aortic valve repair since that is contributing mostly to her symptoms of shortness of breath.    Patient was seen at [facility].,  She had a repeat right and left heart cath, coronaries with  "nonobstructive disease, she was found to have moderate aortic regurg on chocardiogram and filling pressures were reported to be on the higher side.  She was started on Lasix.    Patient was also referred to a cardiologist at [facility].  An MRI was requested for further evaluation of her to \"but her pacemaker leads were not compatible.  There is also discussion of the GUSTAVO that has not been performed.    She has not seen Dr. Mack in a while though.    Patient reported she has been having worsening shortness of breath over the last 1 week.  Has gained 3 to 4 pounds and has trace lower extremity swelling.  She has not been taking her Lasix.    After multiple visits to [facility], patient was not satisfied and wanted a second opinion.  I referred her to [place] with Dr. Hardy.  She had a GUSTAVO done there which showed severe arctic regurgitation.  She is being planned for repeat CTA for planning for aortic valve regurgitation and her known aortic aneurysm.    Shortness of breath is improved with the diuresis.  Blood pressure is optimally controlled at home.      Review of Systems - ROS  Constitution: Negative for weakness, weight gain and weight loss.   HENT: Negative for congestion.    Eyes: Negative for blurred vision.   Cardiovascular: As mentioned above  Respiratory: Negative for cough and hemoptysis.    Endocrine: Negative for polydipsia and polyuria.   Hematologic/Lymphatic: Negative for bleeding problem. Does not bruise/bleed easily.   Skin: Negative for flushing.   Musculoskeletal: Negative for neck pain and stiffness.   Gastrointestinal: Negative for abdominal pain, diarrhea, jaundice, melena, nausea and vomiting.   Genitourinary: Negative for dysuria and hematuria.   Neurological: Negative for dizziness, focal weakness and numbness.   Psychiatric/Behavioral: Negative for altered mental status and depression.          All other systems were reviewed and were negative.    family history includes Heart disease in " "her father and mother.     reports that she has never smoked. She has never used smokeless tobacco. She reports that she does not drink alcohol and does not use drugs.    Allergies   Allergen Reactions   • Hydroxyzine Headache   • Ampicillin Rash   • Milk-Related Compounds GI Intolerance   • Penicillins Rash   • Pravastatin Mental Status Change   • Sulfa Antibiotics Hives         Current Outpatient Medications:   •  amLODIPine (NORVASC) 10 MG tablet, Take 1 tablet by mouth Daily., Disp: 90 tablet, Rfl: 3  •  aspirin 81 MG EC tablet, Take 81 mg by mouth 2 (Two) Times a Day., Disp: , Rfl:   •  atorvastatin (LIPITOR) 40 MG tablet, Take 40 mg by mouth Every Night., Disp: , Rfl:   •  dilTIAZem CD (CARDIZEM CD) 120 MG 24 hr capsule, Take 1 capsule by mouth once daily, Disp: 90 capsule, Rfl: 1  •  furosemide (LASIX) 40 MG tablet, Take 1 tablet by mouth Daily., Disp: 90 tablet, Rfl: 3  •  isosorbide mononitrate (IMDUR) 120 MG 24 hr tablet, Take 1 tablet by mouth once daily, Disp: 90 tablet, Rfl: 6  •  lisinopril (PRINIVIL,ZESTRIL) 10 MG tablet, Take 1 tablet by mouth once daily, Disp: 90 tablet, Rfl: 3  •  nitroglycerin (NITROSTAT) 0.4 MG SL tablet, Place 0.4 mg under the tongue Every 5 (Five) Minutes As Needed. do not exceed a total of 3 doses in 15 minutes, Disp: , Rfl:   •  sotalol (BETAPACE) 80 MG tablet, Take 1 tablet by mouth twice daily, Disp: 180 tablet, Rfl: 1  •  spironolactone (ALDACTONE) 25 MG tablet, Take 25 mg by mouth Daily., Disp: , Rfl:     Physical Exam:  Vitals:    07/07/22 0930   BP: 146/70   BP Location: Left arm   Patient Position: Sitting   Cuff Size: Adult   SpO2: 97%   Weight: 83 kg (183 lb)   Height: 162.6 cm (64\")       Current Pain Level: none  Pulse Ox: Normal  on room air  General: alert, appears stated age and cooperative     Body Habitus: well-nourished    HEENT: Head: Normocephalic, no lesions, without obvious abnormality. No arcus senilis, xanthelasma or xanthomas.    Neuro: alert, oriented " x3  Pulses: 2+ and symmetric  JVP: Volume/Pulsation: Normal.  Normal waveforms.   Appropriate inspiratory decrease.  No Kussmaul's. No Gerardo's.   Carotid Exam: no bruit normal pulsation bilaterally   Carotid Volume: normal.     Respirations: no increased work of breathing   Chest:  Normal    Pulmonary:Normal   Precordium: Normal impulses. P2 is not palpable.  RV Heave: absent  LV Heave: absent  Bethesda:  normal size and placement  Palpable S4: absent.  Heart rate: normal    Heart Rhythm: regular     Heart Sounds: S1: normal  S2: normal  S3: absent   S4: absent  Opening Snap: absent   Diastolic murmur audible in aortic area.   Pericardial Rub:  Absent: .    Abdomen:   Appearance: normal .  Palpation: Soft, non-tender to palpation, bowel sounds positive in all four quadrants; no guarding or rebound tenderness  Extremity: no edema.   LE Skin: no rashes  LE Hair:  normal  LE Pulses: well perfused with normal pulses in the distal extremities  Pallor on elevation: Absent. Rubor on dependency: None      DATA REVIEWED:     EKG. I personally reviewed and interpreted the EKG.  venricular paced rhythm    ECG/EMG Results (all)     None        ---------------------------------------------------  TTE/GUSTAVO:  Results for orders placed in visit on 01/13/22    Adult Transthoracic Echo Complete W/ Cont if Necessary Per Protocol    Interpretation Summary  · Left ventricular ejection fraction appears to be 56 - 60%. Left ventricular systolic function is normal.  · Left ventricular diastolic function is consistent with (grade II w/high LAP) pseudonormalization.  · Left ventricular wall thickness is consistent with mild concentric hypertrophy.  · Moderate aortic valve regurgitation is present.  · Left atrial volume is moderately increased.  · Aneurysmal dilation of the proximal aorta is present. There is an unknown type of proximal aorta graft noted. Ascending aorta measures 4.5cm in diameter (Unchangde from before). Linear echogenic  density noted in the ascending aorta which appears unchanged from prior echo. In the setting of previous ascending aortic aneurysm repair most likely related to post-operative changes. Color doppler did not reveal any flow into the space beyond the echogenic density.    No significant interval changes noted.        --------------------------------------------------------------------------------------------------  LABS:     The CVD Risk score (Pricila, et al., 2008) failed to calculate for the following reasons:    The 2008 CVD risk score is only valid for ages 30 to 74    The patient has a prior MI, stroke, CHF, or peripheral vascular disease diagnosis         Lab Results   Component Value Date    GLUCOSE 101 (H) 05/12/2022    BUN 20 05/12/2022    CREATININE 0.81 05/12/2022    EGFRIFNONA 61 10/08/2021    BCR 24.7 05/12/2022    K 4.2 05/12/2022    CO2 29.0 05/12/2022    CALCIUM 9.4 05/12/2022    ALBUMIN 4.20 05/12/2022    AST 28 05/12/2022    ALT 28 05/12/2022     Lab Results   Component Value Date    WBC 7.50 02/04/2021    HGB 11.3 (L) 02/04/2021    HCT 32.5 (L) 02/04/2021    MCV 93.9 02/04/2021     02/04/2021     Lab Results   Component Value Date    CHOL 185 11/03/2020    CHLPL 143 06/17/2016    TRIG 87 11/03/2020    HDL 48 11/03/2020     (H) 11/03/2020     Lab Results   Component Value Date    TSH 1.600 10/13/2020     No results found for: CKTOTAL, CKMB, CKMBINDEX, TROPONINI, TROPONINT  Lab Results   Component Value Date    HGBA1C 5.8 (H) 06/17/2016     No results found for: DDIMER  Lab Results   Component Value Date    ALT 28 05/12/2022     Lab Results   Component Value Date    HGBA1C 5.8 (H) 06/17/2016     Lab Results   Component Value Date    CREATININE 0.81 05/12/2022     No results found for: IRON, TIBC, FERRITIN  Lab Results   Component Value Date    INR 0.98 02/04/2021    INR 0.98 12/16/2020    INR 0.95 12/01/2020    PROTIME 13.4 02/04/2021    PROTIME 13.4 12/16/2020    PROTIME 13.0  12/01/2020       Assessment/Plan     1. Status post ascending aortic aneurysm repair:  Concerning findings on echo and CT as above.  Now with severe aortic regurgitation.  Following at [place] for consideration for aortic valve replacement and her aneurysm repair.      2.  Aortic regurgitation:  Recent GUSTAVO at [place] showed severe aortic regurgitation.  In the setting of known aortic aneurysm.  Optimal blood pressure control with changes as below.  She is being planned for AVR.    2. Hypertension:  Amlodipine  10 mg  Cardizem 120 mg  Imdur 120 mg  lisinopril 10 mg  Spironolactone 25mg  Sotalol 80mg BID    Target blood pressure 120 x 80.    3. Hyperlipidemia LDL>100, will increased lipitor to 40mgt.     4. sick sinus syndrome status post dual-chamber pacemaker, on sotalol for paroxysmal atrial fibrillation and remains in sinus rhythm.  Status post recent generator change in March 2021.    5.  Paroxysmal A. fib  Previously diagnosed, however recent pacemaker check showed that she was in normal sinus rhythm.  Has been on sotalol for rhythm control.  CHADSVASC is 4.   She has not been on anticoagulation, since she has a pacemaker and there is reliable source of there is no recurrence we will continue to hold off anti-coagulation. Also in the setting of her aortic dilation, its ok to hold off AC.     6.  Coronary artery disease:  Cardiac cath with mild nonobstructive disease and congestive heart failure preserved ejection fraction:  Optimal blood pressure control as above.    7.  Shortness of breath:  Multifactorial from aortic regurgitation and congestive heart failure preserved ejection fraction:  Optimal blood pressure control as above.  Starting her on diuretics as above with close follow-up.      Prevention:  Patient's Body mass index is 31.41 kg/m². BMI is above normal parameters. Recommendations include: exercise counseling and nutrition counseling.      Abril Carranza Orthodox is not a smoker    AAA Screening:    Not needed            This document has been electronically signed by Marquita Weinberg MD on July 7, 2022 09:45 CDT

## 2022-07-08 LAB
QT INTERVAL: 452 MS
QTC INTERVAL: 458 MS

## 2022-08-10 ENCOUNTER — CLINICAL SUPPORT (OUTPATIENT)
Dept: CARDIOLOGY | Facility: CLINIC | Age: 77
End: 2022-08-10

## 2022-08-10 DIAGNOSIS — I49.5 SICK SINUS SYNDROME: Primary | ICD-10-CM

## 2022-08-10 NOTE — PROGRESS NOTES
Pacemaker Evaluation Report    August 10, 2022    Primary Cardiologist: Dr. Weinberg  Implanting MD: Dr. Arroyo  : [employer] Model: 2272 Serial Number: [id number]   Implant date: 2/5/2021     Reason for evaluation:routine, PPM, office  Cardiac device indication(s):sinus node dysfunction/SSS    Battery  CARLOS: 5.9-7 yrs     Interrogation Results  Atrial sensing: P wave: dependent  Atrial capture: 0.75 V @ 0.5 ms   Atrial lead impedance: 340 ohms  Ventricular sensing: R wave: 2.3 mV  Ventricular capture: 1.0 V @ 0.5 ms  Ventricular lead impedance: right  330 ohms    Parameters  Mode: DDDR  Base Rate: 60/120    Diagnostic Data  Atrial paced: 99 %  Ventricular paced: 66 %  Mode switch:< 1%  AT/AF [place]: <1%  AHR: 1702 counts ( appears to be noise, hx of)  VHR: 0  Hx of noise      Intrinsic rate: Dependent    Changes made: No changes    Conclusions: Normal device function. Follow up in one year, remote every 3 months.      Assessment:   Diagnosis Plan   1. Sick sinus syndrome (HCC)

## 2022-10-10 ENCOUNTER — TRANSCRIBE ORDERS (OUTPATIENT)
Dept: LAB | Facility: HOSPITAL | Age: 77
End: 2022-10-10

## 2022-10-10 ENCOUNTER — LAB (OUTPATIENT)
Dept: LAB | Facility: HOSPITAL | Age: 77
End: 2022-10-10

## 2022-10-10 DIAGNOSIS — I10 ESSENTIAL (PRIMARY) HYPERTENSION: ICD-10-CM

## 2022-10-10 DIAGNOSIS — N18.2 CHRONIC KIDNEY DISEASE, STAGE II (MILD): ICD-10-CM

## 2022-10-10 DIAGNOSIS — N17.9 ACUTE KIDNEY INJURY: ICD-10-CM

## 2022-10-10 DIAGNOSIS — E78.01 FAMILIAL HYPERCHOLESTEROLEMIA: ICD-10-CM

## 2022-10-10 DIAGNOSIS — E55.9 VITAMIN D DEFICIENCY: ICD-10-CM

## 2022-10-10 DIAGNOSIS — I49.5 SICK SINUS SYNDROME: ICD-10-CM

## 2022-10-10 DIAGNOSIS — I48.0 PAROXYSMAL ATRIAL FIBRILLATION: Primary | ICD-10-CM

## 2022-10-10 DIAGNOSIS — M10.00 IDIOPATHIC GOUT, UNSPECIFIED CHRONICITY, UNSPECIFIED SITE: ICD-10-CM

## 2022-10-10 DIAGNOSIS — I48.0 PAROXYSMAL ATRIAL FIBRILLATION: ICD-10-CM

## 2022-10-10 PROCEDURE — 82570 ASSAY OF URINE CREATININE: CPT

## 2022-10-10 PROCEDURE — 84100 ASSAY OF PHOSPHORUS: CPT | Performed by: INTERNAL MEDICINE

## 2022-10-10 PROCEDURE — 84156 ASSAY OF PROTEIN URINE: CPT

## 2022-10-10 PROCEDURE — 83735 ASSAY OF MAGNESIUM: CPT | Performed by: INTERNAL MEDICINE

## 2022-10-10 PROCEDURE — 82306 VITAMIN D 25 HYDROXY: CPT | Performed by: INTERNAL MEDICINE

## 2022-10-10 PROCEDURE — 85025 COMPLETE CBC W/AUTO DIFF WBC: CPT | Performed by: INTERNAL MEDICINE

## 2022-10-10 PROCEDURE — 36415 COLL VENOUS BLD VENIPUNCTURE: CPT | Performed by: INTERNAL MEDICINE

## 2022-10-10 PROCEDURE — 80053 COMPREHEN METABOLIC PANEL: CPT | Performed by: INTERNAL MEDICINE

## 2022-10-11 LAB
25(OH)D3 SERPL-MCNC: 27.3 NG/ML (ref 30–100)
ALBUMIN SERPL-MCNC: 4.4 G/DL (ref 3.5–5.2)
ALBUMIN/GLOB SERPL: 1.9 G/DL
ALP SERPL-CCNC: 88 U/L (ref 39–117)
ALT SERPL W P-5'-P-CCNC: 28 U/L (ref 1–33)
ANION GAP SERPL CALCULATED.3IONS-SCNC: 8.3 MMOL/L (ref 5–15)
AST SERPL-CCNC: 26 U/L (ref 1–32)
BASOPHILS # BLD AUTO: 0.04 10*3/MM3 (ref 0–0.2)
BASOPHILS NFR BLD AUTO: 0.8 % (ref 0–1.5)
BILIRUB SERPL-MCNC: 1.1 MG/DL (ref 0–1.2)
BUN SERPL-MCNC: 12 MG/DL (ref 8–23)
BUN/CREAT SERPL: 14.6 (ref 7–25)
CALCIUM SPEC-SCNC: 9 MG/DL (ref 8.6–10.5)
CHLORIDE SERPL-SCNC: 106 MMOL/L (ref 98–107)
CO2 SERPL-SCNC: 28.7 MMOL/L (ref 22–29)
CREAT SERPL-MCNC: 0.82 MG/DL (ref 0.57–1)
CREAT UR-MCNC: 78.3 MG/DL
DEPRECATED RDW RBC AUTO: 43.5 FL (ref 37–54)
EGFRCR SERPLBLD CKD-EPI 2021: 73.3 ML/MIN/1.73
EOSINOPHIL # BLD AUTO: 0.07 10*3/MM3 (ref 0–0.4)
EOSINOPHIL NFR BLD AUTO: 1.3 % (ref 0.3–6.2)
ERYTHROCYTE [DISTWIDTH] IN BLOOD BY AUTOMATED COUNT: 12.8 % (ref 12.3–15.4)
GLOBULIN UR ELPH-MCNC: 2.3 GM/DL
GLUCOSE SERPL-MCNC: 106 MG/DL (ref 65–99)
HCT VFR BLD AUTO: 33.4 % (ref 34–46.6)
HGB BLD-MCNC: 11.4 G/DL (ref 12–15.9)
IMM GRANULOCYTES # BLD AUTO: 0.01 10*3/MM3 (ref 0–0.05)
IMM GRANULOCYTES NFR BLD AUTO: 0.2 % (ref 0–0.5)
LYMPHOCYTES # BLD AUTO: 1.25 10*3/MM3 (ref 0.7–3.1)
LYMPHOCYTES NFR BLD AUTO: 24 % (ref 19.6–45.3)
MAGNESIUM SERPL-MCNC: 2.1 MG/DL (ref 1.6–2.4)
MCH RBC QN AUTO: 31.7 PG (ref 26.6–33)
MCHC RBC AUTO-ENTMCNC: 34.1 G/DL (ref 31.5–35.7)
MCV RBC AUTO: 92.8 FL (ref 79–97)
MONOCYTES # BLD AUTO: 0.37 10*3/MM3 (ref 0.1–0.9)
MONOCYTES NFR BLD AUTO: 7.1 % (ref 5–12)
NEUTROPHILS NFR BLD AUTO: 3.46 10*3/MM3 (ref 1.7–7)
NEUTROPHILS NFR BLD AUTO: 66.6 % (ref 42.7–76)
NRBC BLD AUTO-RTO: 0 /100 WBC (ref 0–0.2)
PHOSPHATE SERPL-MCNC: 3.3 MG/DL (ref 2.5–4.5)
PLATELET # BLD AUTO: 184 10*3/MM3 (ref 140–450)
PMV BLD AUTO: 12.1 FL (ref 6–12)
POTASSIUM SERPL-SCNC: 3.8 MMOL/L (ref 3.5–5.2)
PROT ?TM UR-MCNC: 31.2 MG/DL
PROT SERPL-MCNC: 6.7 G/DL (ref 6–8.5)
PROT/CREAT UR: 398.5 MG/G CREA (ref 0–200)
RBC # BLD AUTO: 3.6 10*6/MM3 (ref 3.77–5.28)
SODIUM SERPL-SCNC: 143 MMOL/L (ref 136–145)
WBC NRBC COR # BLD: 5.2 10*3/MM3 (ref 3.4–10.8)

## 2022-10-17 RX ORDER — ISOSORBIDE MONONITRATE 120 MG/1
TABLET, EXTENDED RELEASE ORAL
Qty: 90 TABLET | Refills: 0 | Status: SHIPPED | OUTPATIENT
Start: 2022-10-17 | End: 2023-01-16

## 2022-11-28 RX ORDER — SOTALOL HYDROCHLORIDE 80 MG/1
TABLET ORAL
Qty: 180 TABLET | Refills: 0 | Status: SHIPPED | OUTPATIENT
Start: 2022-11-28 | End: 2023-03-09 | Stop reason: SDUPTHER

## 2022-12-09 RX ORDER — DILTIAZEM HYDROCHLORIDE 120 MG/1
CAPSULE, COATED, EXTENDED RELEASE ORAL
Qty: 90 CAPSULE | Refills: 1 | Status: SHIPPED | OUTPATIENT
Start: 2022-12-09

## 2023-01-11 ENCOUNTER — OFFICE VISIT (OUTPATIENT)
Dept: CARDIOLOGY | Facility: CLINIC | Age: 78
End: 2023-01-11
Payer: MEDICARE

## 2023-01-11 VITALS
SYSTOLIC BLOOD PRESSURE: 128 MMHG | HEART RATE: 60 BPM | HEIGHT: 64 IN | DIASTOLIC BLOOD PRESSURE: 82 MMHG | BODY MASS INDEX: 30.15 KG/M2 | WEIGHT: 176.6 LBS | OXYGEN SATURATION: 97 %

## 2023-01-11 DIAGNOSIS — I48.0 PAF (PAROXYSMAL ATRIAL FIBRILLATION): Primary | ICD-10-CM

## 2023-01-11 PROCEDURE — 93000 ELECTROCARDIOGRAM COMPLETE: CPT | Performed by: INTERNAL MEDICINE

## 2023-01-11 PROCEDURE — 99214 OFFICE O/P EST MOD 30 MIN: CPT | Performed by: INTERNAL MEDICINE

## 2023-01-11 NOTE — PROGRESS NOTES
[facility] Cardiology  OFFICE NOTE    Cardiovascular Medicine  Marquita Weinberg M.D., RPVI         No referring provider defined for this encounter.    Thank you for asking me to see Abril Avilez for f/up.    Atrial Fibrillation  Past medical history includes atrial fibrillation.     This is a 78 y.o. female with:  1. SSS (sick sinus syndrome) (CMS/HCC)    2. Essential hypertension    3. Pure hypercholesterolemia    4. Paroxysmal atrial fibrillation (CMS/HCC)    5.  Aortic regurgitation  6.  Ascending arctic aneurysm status postrepair     Chief complaint -Follow-up atrial fibrillation        History of present Illness- 78 y.o.-year-old lady with history of ascending aortic aneurysm repair in 2010. patient presented for scheduled out patient echo 2019 to assess her known aortic regurgitation and previous history of aortic repairt.  Possible dissection noted in aorta on echo. Patient sent for CTA of chest. Was noted to have possible dissection and intramural thrombus. I consulted Dr. Cotter in the setting of her previous aortic repair. Dr. Cotter reviewed the films and discussed with Dr. Mack at [facility]. Patient was asymptomatic, per Dr. Cotter's recommendations patient was stable for discharged and to f/up with Dr. Mack    However patient worsening shortness of breath over the last 1 year, also an episode of chest pain so she underwent a CTA coronary angiogram which showed potential obstructive disease in the LAD followed by cardiac cath which was without any evidence of obstructive coronary artery disease in the LAD.    Repeat echocardiogram continues to show moderate aortic regurgitation.  Since her symptoms of shortness of breath were getting worse referred her back to [facility] for consideration for aortic valve repair since that is contributing mostly to her symptoms of shortness of breath.    Patient was seen at [facility].,  She had a repeat right and left heart cath, coronaries with  "nonobstructive disease, she was found to have moderate aortic regurg on chocardiogram and filling pressures were reported to be on the higher side.  She was started on Lasix.    Patient was also referred to a cardiologist at [facility].  An MRI was requested for further evaluation of her to \"but her pacemaker leads were not compatible.  There is also discussion of the GUSTAVO that has not been performed.    She has not seen Dr. Mack in a while though.    Patient reported she has been having worsening shortness of breath over the last 1 week.  Has gained 3 to 4 pounds and has trace lower extremity swelling.  She has not been taking her Lasix.    After multiple visits to [facility], patient was not satisfied and wanted a second opinion.  I referred her to [place] with Dr. Hardy.  She had a GUSTAVO done there which showed severe arctic regurgitation.  She is being planned for repeat CTA for planning for aortic valve regurgitation and her known aortic aneurysm.    Shortness of breath is improved with the diuresis.  Blood pressure is optimally controlled at home.    Doing better than before.  Shortness of breath has improved.  Still has limitations with exertion.  Following in [facility] [place] now for consideration for AVR.    Review of Systems - ROS  Constitution: Negative for weakness, weight gain and weight loss.   HENT: Negative for congestion.    Eyes: Negative for blurred vision.   Cardiovascular: As mentioned above  Respiratory: Negative for cough and hemoptysis.    Endocrine: Negative for polydipsia and polyuria.   Hematologic/Lymphatic: Negative for bleeding problem. Does not bruise/bleed easily.   Skin: Negative for flushing.   Musculoskeletal: Negative for neck pain and stiffness.   Gastrointestinal: Negative for abdominal pain, diarrhea, jaundice, melena, nausea and vomiting.   Genitourinary: Negative for dysuria and hematuria.   Neurological: Negative for dizziness, focal weakness and numbness. "   Psychiatric/Behavioral: Negative for altered mental status and depression.     I reviewed the ROS as documented here and confirmed the accuracy of it with the patient today. 1/11/2023        All other systems were reviewed and were negative.    family history includes Heart disease in her father and mother.     reports that she has never smoked. She has never used smokeless tobacco. She reports that she does not drink alcohol and does not use drugs.    Allergies   Allergen Reactions   • Hydroxyzine Headache   • Ampicillin Rash   • Milk-Related Compounds GI Intolerance   • Penicillins Rash   • Pravastatin Mental Status Change   • Sulfa Antibiotics Hives         Current Outpatient Medications:   •  amLODIPine (NORVASC) 10 MG tablet, Take 1 tablet by mouth Daily., Disp: 90 tablet, Rfl: 3  •  aspirin 81 MG EC tablet, Take 81 mg by mouth 2 (Two) Times a Day., Disp: , Rfl:   •  atorvastatin (LIPITOR) 40 MG tablet, Take 40 mg by mouth Every Night., Disp: , Rfl:   •  dilTIAZem CD (CARDIZEM CD) 120 MG 24 hr capsule, Take 1 capsule by mouth once daily, Disp: 90 capsule, Rfl: 1  •  furosemide (LASIX) 40 MG tablet, Take 1 tablet by mouth Daily., Disp: 90 tablet, Rfl: 3  •  isosorbide mononitrate (IMDUR) 120 MG 24 hr tablet, Take 1 tablet by mouth once daily, Disp: 90 tablet, Rfl: 0  •  lisinopril (PRINIVIL,ZESTRIL) 10 MG tablet, Take 1 tablet by mouth once daily, Disp: 90 tablet, Rfl: 3  •  nitroglycerin (NITROSTAT) 0.4 MG SL tablet, Place 0.4 mg under the tongue Every 5 (Five) Minutes As Needed. do not exceed a total of 3 doses in 15 minutes, Disp: , Rfl:   •  sotalol (BETAPACE) 80 MG tablet, Take 1 tablet by mouth twice daily, Disp: 180 tablet, Rfl: 0  •  spironolactone (ALDACTONE) 25 MG tablet, Take 25 mg by mouth Daily., Disp: , Rfl:     Physical Exam:  Vitals:    01/11/23 0940   BP: 128/82   BP Location: Right arm   Patient Position: Sitting   Cuff Size: Adult   Pulse: 60   SpO2: 97%   Weight: 80.1 kg (176 lb 9.6 oz)  "  Height: 162.6 cm (64\")       Current Pain Level: none  Pulse Ox: Normal  on room air  General: alert, appears stated age and cooperative     Body Habitus: well-nourished    HEENT: Head: Normocephalic, no lesions, without obvious abnormality. No arcus senilis, xanthelasma or xanthomas.    Neuro: alert, oriented x3  Pulses: 2+ and symmetric  JVP: Volume/Pulsation: Normal.  Normal waveforms.   Appropriate inspiratory decrease.  No Kussmaul's. No Gerardo's.   Carotid Exam: no bruit normal pulsation bilaterally   Carotid Volume: normal.     Respirations: no increased work of breathing   Chest:  Normal    Pulmonary:Normal   Precordium: Normal impulses. P2 is not palpable.  RV Heave: absent  LV Heave: absent  Ohlman:  normal size and placement  Palpable S4: absent.  Heart rate: normal    Heart Rhythm: regular     Heart Sounds: S1: normal  S2: normal  S3: absent   S4: absent  Opening Snap: absent   Diastolic murmur audible in aortic area.   Pericardial Rub:  Absent: .    Abdomen:   Appearance: normal .  Palpation: Soft, non-tender to palpation, bowel sounds positive in all four quadrants; no guarding or rebound tenderness  Extremity: no edema.   LE Skin: no rashes  LE Hair:  normal  LE Pulses: well perfused with normal pulses in the distal extremities  Pallor on elevation: Absent. Rubor on dependency: None      DATA REVIEWED:     EKG. I personally reviewed and interpreted the EKG.  venricular paced rhythm    ECG/EMG Results (all)     None        ---------------------------------------------------  TTE/GUSTAVO:  Results for orders placed in visit on 01/13/22    Adult Transthoracic Echo Complete W/ Cont if Necessary Per Protocol    Interpretation Summary  · Left ventricular ejection fraction appears to be 56 - 60%. Left ventricular systolic function is normal.  · Left ventricular diastolic function is consistent with (grade II w/high LAP) pseudonormalization.  · Left ventricular wall thickness is consistent with mild concentric " hypertrophy.  · Moderate aortic valve regurgitation is present.  · Left atrial volume is moderately increased.  · Aneurysmal dilation of the proximal aorta is present. There is an unknown type of proximal aorta graft noted. Ascending aorta measures 4.5cm in diameter (Unchangde from before). Linear echogenic density noted in the ascending aorta which appears unchanged from prior echo. In the setting of previous ascending aortic aneurysm repair most likely related to post-operative changes. Color doppler did not reveal any flow into the space beyond the echogenic density.    No significant interval changes noted.        --------------------------------------------------------------------------------------------------  LABS:     The CVD Risk score (MARIA TERESA'Chrisino, et al., 2008) failed to calculate for the following reasons:    The 2008 CVD risk score is only valid for ages 30 to 74    The patient has a prior MI, stroke, CHF, or peripheral vascular disease diagnosis         Lab Results   Component Value Date    GLUCOSE 106 (H) 10/10/2022    BUN 12 10/10/2022    CREATININE 0.82 10/10/2022    EGFRIFNONA 61 10/08/2021    BCR 14.6 10/10/2022    K 3.8 10/10/2022    CO2 28.7 10/10/2022    CALCIUM 9.0 10/10/2022    ALBUMIN 4.40 10/10/2022    AST 26 10/10/2022    ALT 28 10/10/2022     Lab Results   Component Value Date    WBC 5.20 10/10/2022    HGB 11.4 (L) 10/10/2022    HCT 33.4 (L) 10/10/2022    MCV 92.8 10/10/2022     10/10/2022     Lab Results   Component Value Date    CHOL 185 11/03/2020    CHLPL 143 06/17/2016    TRIG 87 11/03/2020    HDL 48 11/03/2020     (H) 11/03/2020     Lab Results   Component Value Date    TSH 1.600 10/13/2020     No results found for: CKTOTAL, CKMB, CKMBINDEX, TROPONINI, TROPONINT  Lab Results   Component Value Date    HGBA1C 5.8 (H) 06/17/2016     No results found for: DDIMER  Lab Results   Component Value Date    ALT 28 10/10/2022     Lab Results   Component Value Date    HGBA1C 5.8 (H)  06/17/2016     Lab Results   Component Value Date    CREATININE 0.82 10/10/2022     No results found for: IRON, TIBC, FERRITIN  Lab Results   Component Value Date    INR 0.98 02/04/2021    INR 0.98 12/16/2020    INR 0.95 12/01/2020    PROTIME 13.4 02/04/2021    PROTIME 13.4 12/16/2020    PROTIME 13.0 12/01/2020       Assessment/Plan     1. Status post ascending aortic aneurysm repair:  Concerning findings on echo and CT as above.  Now with severe aortic regurgitation.  Following at [place] for consideration for aortic valve replacement and her aneurysm repair.  She is being considered for Jenavalve study    2.  Aortic regurgitation:  Recent GUSTAVO at [place] showed severe aortic regurgitation.  In the setting of known aortic aneurysm.  Optimal blood pressure control with changes as below.  She is being planned for AVR as above    2. Hypertension:  Amlodipine  10 mg  Cardizem 120 mg  Imdur 120 mg  lisinopril 10 mg  Spironolactone 25mg  Sotalol 80mg BID    Target blood pressure 120 x 80.    3. Hyperlipidemia LDL>100, will increased lipitor to 40mgt.     4. sick sinus syndrome status post dual-chamber pacemaker, on sotalol for paroxysmal atrial fibrillation and remains in sinus rhythm.  Status post recent generator change in March 2021.    5.  Paroxysmal A. fib  Previously diagnosed, however recent pacemaker check showed that she was in normal sinus rhythm.  Has been on sotalol for rhythm control.  CHADSVASC is 4.   She has not been on anticoagulation, since she has a pacemaker and there is reliable source of there is no recurrence we will continue to hold off anti-coagulation. Also in the setting of her aortic dilation, its ok to hold off AC.     6.  Coronary artery disease:  Cardiac cath with mild nonobstructive disease and congestive heart failure preserved ejection fraction:  Optimal blood pressure control as above.    7.  Shortness of breath:  Multifactorial from aortic regurgitation and congestive heart  failure preserved ejection fraction:  Optimal blood pressure control as above.  Starting her on diuretics as above with close follow-up.      Prevention:  Patient's Body mass index is 30.31 kg/m². BMI is above normal parameters. Recommendations include: exercise counseling and nutrition counseling.      Abril Tere Avilez is not a smoker    AAA Screening:   Not needed            This document has been electronically signed by Marquita Weinberg MD on January 11, 2023 09:56 CST

## 2023-01-13 LAB
QT INTERVAL: 468 MS
QTC INTERVAL: 468 MS

## 2023-01-16 RX ORDER — ISOSORBIDE MONONITRATE 120 MG/1
TABLET, EXTENDED RELEASE ORAL
Qty: 90 TABLET | Refills: 3 | Status: SHIPPED | OUTPATIENT
Start: 2023-01-16

## 2023-03-09 RX ORDER — SOTALOL HYDROCHLORIDE 80 MG/1
80 TABLET ORAL 2 TIMES DAILY
Qty: 180 TABLET | Refills: 1 | Status: SHIPPED | OUTPATIENT
Start: 2023-03-09

## 2023-04-13 ENCOUNTER — OFFICE VISIT (OUTPATIENT)
Dept: CARDIOLOGY | Facility: CLINIC | Age: 78
End: 2023-04-13
Payer: MEDICARE

## 2023-04-13 VITALS
DIASTOLIC BLOOD PRESSURE: 58 MMHG | BODY MASS INDEX: 30.17 KG/M2 | OXYGEN SATURATION: 98 % | SYSTOLIC BLOOD PRESSURE: 148 MMHG | HEART RATE: 58 BPM | WEIGHT: 176.7 LBS | HEIGHT: 64 IN

## 2023-04-13 DIAGNOSIS — I48.0 PAF (PAROXYSMAL ATRIAL FIBRILLATION): Primary | ICD-10-CM

## 2023-04-13 LAB
QT INTERVAL: 572 MS
QTC INTERVAL: 561 MS

## 2023-04-13 NOTE — PROGRESS NOTES
[facility]  OFFICE NOTE    Cardiovascular Medicine  Marquita Weinberg M.D., RPVI         No referring provider defined for this encounter.    Thank you for asking me to see Abril Avilez for f/up.    Atrial Fibrillation  Past medical history includes atrial fibrillation.     This is a 78 y.o. female with:  1. SSS (sick sinus syndrome) (CMS/HCC)    2. Essential hypertension    3. Pure hypercholesterolemia    4. Paroxysmal atrial fibrillation (CMS/HCC)    5.  Aortic regurgitation  6.  Ascending arctic aneurysm status postrepair     Chief complaint -Follow-up atrial fibrillation        History of present Illness- 78 y.o.-year-old lady with history of ascending aortic aneurysm repair in 2010. patient presented for scheduled out patient echo 2019 to assess her known aortic regurgitation and previous history of aortic repairt.  Possible dissection noted in aorta on echo. Patient sent for CTA of chest. Was noted to have possible dissection and intramural thrombus. I consulted Dr. Cotter in the setting of her previous aortic repair. Dr. Cotter reviewed the films and discussed with Dr. Mack at [facility]. Patient was asymptomatic, per Dr. Cotter's recommendations patient was stable for discharged and to f/up with Dr. Mack    However patient worsening shortness of breath over the last 1 year, also an episode of chest pain so she underwent a CTA coronary angiogram which showed potential obstructive disease in the LAD followed by cardiac cath which was without any evidence of obstructive coronary artery disease in the LAD.    Repeat echocardiogram continues to show moderate aortic regurgitation.  Since her symptoms of shortness of breath were getting worse referred her back to [facility] for consideration for aortic valve repair since that is contributing mostly to her symptoms of shortness of breath.    Patient was seen at [facility].,  She had a repeat right and left heart cath, coronaries with  "nonobstructive disease, she was found to have moderate aortic regurg on chocardiogram and filling pressures were reported to be on the higher side.  She was started on Lasix.    Patient was also referred to a cardiologist at [facility].  An MRI was requested for further evaluation of her to \"but her pacemaker leads were not compatible.  There is also discussion of the GUSTAVO that has not been performed.    She has not seen Dr. Mack in a while though.    Patient reported she has been having worsening shortness of breath over the last 1 week.  Has gained 3 to 4 pounds and has trace lower extremity swelling.  She has not been taking her Lasix.    After multiple visits to [facility], patient was not satisfied and wanted a second opinion.  I referred her to [place] with Dr. Hardy.  She had a GUSTAVO done there which showed severe arctic regurgitation.  She is being planned for repeat CTA for planning for aortic valve regurgitation and her known aortic aneurysm.    Shortness of breath is improved with the diuresis.  Blood pressure is optimally controlled at home.    Doing better than before.  Shortness of breath has improved.  Still has limitations with exertion.  Following in [place] at [facility] now for consideration for AVR.    Following closely with [facility] for consideration for aortic valve replacement.      Review of Systems - ROS  Constitution: Negative for weakness, weight gain and weight loss.   HENT: Negative for congestion.    Eyes: Negative for blurred vision.   Cardiovascular: As mentioned above  Respiratory: Negative for cough and hemoptysis.    Endocrine: Negative for polydipsia and polyuria.   Hematologic/Lymphatic: Negative for bleeding problem. Does not bruise/bleed easily.   Skin: Negative for flushing.   Musculoskeletal: Negative for neck pain and stiffness.   Gastrointestinal: Negative for abdominal pain, diarrhea, jaundice, melena, nausea and vomiting.   Genitourinary: Negative for dysuria and " hematuria.   Neurological: Negative for dizziness, focal weakness and numbness.   Psychiatric/Behavioral: Negative for altered mental status and depression.     I reviewed the ROS as documented here and confirmed the accuracy of it with the patient today. 4/13/2023        All other systems were reviewed and were negative.    family history includes Heart disease in her father and mother.     reports that she has never smoked. She has never used smokeless tobacco. She reports that she does not drink alcohol and does not use drugs.    Allergies   Allergen Reactions   • Hydroxyzine Headache   • Ampicillin Rash   • Milk-Related Compounds GI Intolerance   • Penicillins Rash   • Pravastatin Mental Status Change   • Sulfa Antibiotics Hives         Current Outpatient Medications:   •  amLODIPine (NORVASC) 10 MG tablet, Take 1 tablet by mouth Daily., Disp: 90 tablet, Rfl: 3  •  aspirin 81 MG EC tablet, Take 1 tablet by mouth 2 (Two) Times a Day., Disp: , Rfl:   •  atorvastatin (LIPITOR) 40 MG tablet, Take 1 tablet by mouth Every Night., Disp: , Rfl:   •  dilTIAZem CD (CARDIZEM CD) 120 MG 24 hr capsule, Take 1 capsule by mouth once daily, Disp: 90 capsule, Rfl: 1  •  furosemide (LASIX) 40 MG tablet, Take 1 tablet by mouth Daily., Disp: 90 tablet, Rfl: 3  •  isosorbide mononitrate (IMDUR) 120 MG 24 hr tablet, Take 1 tablet by mouth once daily, Disp: 90 tablet, Rfl: 3  •  lisinopril (PRINIVIL,ZESTRIL) 10 MG tablet, Take 1 tablet by mouth once daily, Disp: 90 tablet, Rfl: 3  •  nitroglycerin (NITROSTAT) 0.4 MG SL tablet, Place 1 tablet under the tongue Every 5 (Five) Minutes As Needed. do not exceed a total of 3 doses in 15 minutes, Disp: , Rfl:   •  sotalol (BETAPACE) 80 MG tablet, Take 1 tablet by mouth 2 (Two) Times a Day., Disp: 180 tablet, Rfl: 1  •  spironolactone (ALDACTONE) 25 MG tablet, Take 1 tablet by mouth Daily., Disp: , Rfl:     Physical Exam:  Vitals:    04/13/23 0859   BP: 148/58   BP Location: Left arm   Patient  "Position: Sitting   Cuff Size: Adult   Pulse: 58   SpO2: 98%   Weight: 80.2 kg (176 lb 11.2 oz)   Height: 162.6 cm (64\")       Current Pain Level: none  Pulse Ox: Normal  on room air  General: alert, appears stated age and cooperative     Body Habitus: well-nourished    HEENT: Head: Normocephalic, no lesions, without obvious abnormality. No arcus senilis, xanthelasma or xanthomas.    Neuro: alert, oriented x3  Pulses: 2+ and symmetric  JVP: Volume/Pulsation: Normal.  Normal waveforms.   Appropriate inspiratory decrease.  No Kussmaul's. No Gerardo's.   Carotid Exam: no bruit normal pulsation bilaterally   Carotid Volume: normal.     Respirations: no increased work of breathing   Chest:  Normal    Pulmonary:Normal   Precordium: Normal impulses. P2 is not palpable.  RV Heave: absent  LV Heave: absent  [place]:  normal size and placement  Palpable S4: absent.  Heart rate: normal    Heart Rhythm: regular     Heart Sounds: S1: normal  S2: normal  S3: absent   S4: absent  Opening Snap: absent   Diastolic murmur audible in aortic area.   Pericardial Rub:  Absent: .    Abdomen:   Appearance: normal .  Palpation: Soft, non-tender to palpation, bowel sounds positive in all four quadrants; no guarding or rebound tenderness  Extremity: no edema.   LE Skin: no rashes  LE Hair:  normal  LE Pulses: well perfused with normal pulses in the distal extremities  Pallor on elevation: Absent. Rubor on dependency: None      DATA REVIEWED:     EKG. I personally reviewed and interpreted the EKG.  venricular paced rhythm    ECG/EMG Results (all)     None        ---------------------------------------------------  TTE/GUSTAVO:  Results for orders placed in visit on 01/13/22    Adult Transthoracic Echo Complete W/ Cont if Necessary Per Protocol    Interpretation Summary  · Left ventricular ejection fraction appears to be 56 - 60%. Left ventricular systolic function is normal.  · Left ventricular diastolic function is consistent with (grade II w/high " LAP) pseudonormalization.  · Left ventricular wall thickness is consistent with mild concentric hypertrophy.  · Moderate aortic valve regurgitation is present.  · Left atrial volume is moderately increased.  · Aneurysmal dilation of the proximal aorta is present. There is an unknown type of proximal aorta graft noted. Ascending aorta measures 4.5cm in diameter (Unchangde from before). Linear echogenic density noted in the ascending aorta which appears unchanged from prior echo. In the setting of previous ascending aortic aneurysm repair most likely related to post-operative changes. Color doppler did not reveal any flow into the space beyond the echogenic density.    No significant interval changes noted.        --------------------------------------------------------------------------------------------------  LABS:     The CVD Risk score (MARIA TERESA'Agostino, et al., 2008) failed to calculate for the following reasons:    The 2008 CVD risk score is only valid for ages 30 to 74    The patient has a prior MI, stroke, CHF, or peripheral vascular disease diagnosis         Lab Results   Component Value Date    GLUCOSE 106 (H) 10/10/2022    BUN 12 10/10/2022    CREATININE 0.82 10/10/2022    EGFRIFNONA 61 10/08/2021    BCR 14.6 10/10/2022    K 3.8 10/10/2022    CO2 28.7 10/10/2022    CALCIUM 9.0 10/10/2022    ALBUMIN 4.40 10/10/2022    AST 26 10/10/2022    ALT 28 10/10/2022     Lab Results   Component Value Date    WBC 5.20 10/10/2022    HGB 11.4 (L) 10/10/2022    HCT 33.4 (L) 10/10/2022    MCV 92.8 10/10/2022     10/10/2022     Lab Results   Component Value Date    CHOL 185 11/03/2020    CHLPL 143 06/17/2016    TRIG 87 11/03/2020    HDL 48 11/03/2020     (H) 11/03/2020     Lab Results   Component Value Date    TSH 1.600 10/13/2020     No results found for: CKTOTAL, CKMB, CKMBINDEX, TROPONINI, TROPONINT  Lab Results   Component Value Date    HGBA1C 5.8 (H) 06/17/2016     No results found for: DDIMER  Lab Results    Component Value Date    ALT 28 10/10/2022     Lab Results   Component Value Date    HGBA1C 5.8 (H) 06/17/2016     Lab Results   Component Value Date    CREATININE 0.82 10/10/2022     No results found for: IRON, TIBC, FERRITIN  Lab Results   Component Value Date    INR 0.98 02/04/2021    INR 0.98 12/16/2020    INR 0.95 12/01/2020    PROTIME 13.4 02/04/2021    PROTIME 13.4 12/16/2020    PROTIME 13.0 12/01/2020       Assessment/Plan     1. Status post ascending aortic aneurysm repair:  Concerning findings on echo and CT as above.  Now with severe aortic regurgitation.  Following at [facility] for consideration for aortic valve replacement and her aneurysm repair.  She is being considered for Jenavalve study    2.  Aortic regurgitation:  Recent GUSTAVO at [facility] showed severe aortic regurgitation.  In the setting of known aortic aneurysm.  Optimal blood pressure control with changes as below.  She is being planned for AVR as above    2. Hypertension:  Amlodipine  10 mg  Cardizem 120 mg  Imdur 120 mg  lisinopril 10 mg  Spironolactone 25mg  Sotalol 80mg BID    Target blood pressure 120 x 80.    3. Hyperlipidemia LDL>100, will increased lipitor to 40mgt.     4. sick sinus syndrome status post dual-chamber pacemaker, on sotalol for paroxysmal atrial fibrillation and remains in sinus rhythm.  Status post recent generator change in March 2021.    5.  Paroxysmal A. fib  Previously diagnosed, however recent pacemaker check showed that she was in normal sinus rhythm.  Has been on sotalol for rhythm control.  CHADSVASC is 4.   She has not been on anticoagulation, since she has a pacemaker and there is reliable source of there is no recurrence we will continue to hold off anti-coagulation. Also in the setting of her aortic dilation, its ok to hold off AC.     6.  Coronary artery disease:  Cardiac cath with mild nonobstructive disease and congestive heart failure preserved ejection fraction:  Optimal blood pressure control as  above.    7.  Shortness of breath:  Multifactorial from aortic regurgitation and congestive heart failure preserved ejection fraction:  Optimal blood pressure control as above.  Starting her on diuretics as above with close follow-up.      Prevention:  Patient's Body mass index is 30.33 kg/m². BMI is above normal parameters. Recommendations include: exercise counseling and nutrition counseling.      Abril Avilez is not a smoker    AAA Screening:   Not needed            This document has been electronically signed by Marquita Weinberg MD on April 13, 2023 09:01 CDT

## 2023-05-09 RX ORDER — AMLODIPINE BESYLATE 10 MG/1
TABLET ORAL
Qty: 90 TABLET | Refills: 3 | Status: SHIPPED | OUTPATIENT
Start: 2023-05-09

## 2023-05-31 RX ORDER — FUROSEMIDE 40 MG/1
40 TABLET ORAL DAILY
Qty: 90 TABLET | Refills: 1 | Status: SHIPPED | OUTPATIENT
Start: 2023-05-31

## 2023-06-01 RX ORDER — DILTIAZEM HYDROCHLORIDE 120 MG/1
CAPSULE, COATED, EXTENDED RELEASE ORAL
Qty: 90 CAPSULE | Refills: 1 | Status: SHIPPED | OUTPATIENT
Start: 2023-06-01

## 2023-06-02 ENCOUNTER — TELEPHONE (OUTPATIENT)
Dept: CARDIOLOGY | Facility: CLINIC | Age: 78
End: 2023-06-02

## 2023-06-02 NOTE — TELEPHONE ENCOUNTER
Anastacia Suarez RegSched Rep [place], Donna L, MA  Her  called and said he called the other day about getting furosemide (LASIX) 40 MG tablet filled wants it sent to [facility] [id number] - [place], [place]    sherri received refill on 5/31. Patient has picked up.

## 2023-08-09 ENCOUNTER — CLINICAL SUPPORT (OUTPATIENT)
Dept: CARDIOLOGY | Facility: CLINIC | Age: 78
End: 2023-08-09
Payer: MEDICARE

## 2023-08-09 DIAGNOSIS — I49.5 SICK SINUS SYNDROME: ICD-10-CM

## 2023-08-09 DIAGNOSIS — I48.0 PAROXYSMAL ATRIAL FIBRILLATION: Primary | ICD-10-CM

## 2023-08-09 DIAGNOSIS — R00.1 BRADYCARDIA: ICD-10-CM

## 2023-08-09 NOTE — PROGRESS NOTES
Pacemaker Evaluation Report    August 10, 2023    Primary Cardiologist: Dr. Weinberg  Implanting MD: Dr. Arroyo  : Abbott Model: 2272 Serial Number: [id number]   Implant date: 2/5/2021     Reason for evaluation:routine, PPM, office  Cardiac device indication(s):sinus node dysfunction/SSS    Battery  CARLOS: 4.6-5.6 yrs     Interrogation Results  Atrial sensing: P wave: dependent  Atrial capture: 0.75 V @ 0.5 ms   Atrial lead impedance: 330 ohms  Ventricular sensing: R wave: 2.3 mV (stable)  Ventricular capture: 1.0 V @ 0.5 ms  Ventricular lead impedance: right  330 ohms    Parameters  Mode: DDDR  Base Rate: 60/120    Diagnostic Data  Atrial paced: 99 %  Ventricular paced: 75 %  Mode switch:< 1%  AT/AF [place]: <1%  AHR: 1697 counts ( appears to be noise, hx of)  VHR: 0  Hx of noise      Intrinsic rate: Dependent    Changes made: No changes    Conclusions: Normal device function. Follow up in one year, remote every 3 months.      Assessment:   Diagnosis Plan   1. Paroxysmal atrial fibrillation        2. Bradycardia        3. Sick sinus syndrome

## 2023-08-30 ENCOUNTER — OFFICE VISIT (OUTPATIENT)
Dept: CARDIOLOGY | Facility: CLINIC | Age: 78
End: 2023-08-30
Payer: MEDICARE

## 2023-08-30 VITALS
WEIGHT: 173.2 LBS | SYSTOLIC BLOOD PRESSURE: 120 MMHG | DIASTOLIC BLOOD PRESSURE: 60 MMHG | HEART RATE: 63 BPM | OXYGEN SATURATION: 98 % | HEIGHT: 64 IN | BODY MASS INDEX: 29.57 KG/M2

## 2023-08-30 DIAGNOSIS — I10 HYPERTENSION, UNSPECIFIED TYPE: Primary | ICD-10-CM

## 2023-08-30 PROCEDURE — 99214 OFFICE O/P EST MOD 30 MIN: CPT | Performed by: INTERNAL MEDICINE

## 2023-08-30 PROCEDURE — 3074F SYST BP LT 130 MM HG: CPT | Performed by: INTERNAL MEDICINE

## 2023-08-30 PROCEDURE — 3078F DIAST BP <80 MM HG: CPT | Performed by: INTERNAL MEDICINE

## 2023-08-30 PROCEDURE — 93000 ELECTROCARDIOGRAM COMPLETE: CPT | Performed by: INTERNAL MEDICINE

## 2023-08-30 PROCEDURE — 1159F MED LIST DOCD IN RCRD: CPT | Performed by: INTERNAL MEDICINE

## 2023-08-30 PROCEDURE — 1160F RVW MEDS BY RX/DR IN RCRD: CPT | Performed by: INTERNAL MEDICINE

## 2023-08-30 RX ORDER — MELATONIN
1000 DAILY
COMMUNITY

## 2023-08-30 NOTE — PROGRESS NOTES
[facility] Cardiology  OFFICE NOTE    Cardiovascular Medicine  Marquita Weinberg M.D., RPVI         No referring provider defined for this encounter.    Thank you for asking me to see Abril Avilez for f/up.    Atrial Fibrillation  Past medical history includes atrial fibrillation.     This is a 78 y.o. female with:  1. SSS (sick sinus syndrome) (CMS/HCC)    2. Essential hypertension    3. Pure hypercholesterolemia    4. Paroxysmal atrial fibrillation (CMS/HCC)    5.  Aortic regurgitation  6.  Ascending arctic aneurysm status postrepair     Chief complaint -Follow-up atrial fibrillation        History of present Illness- 78 y.o.-year-old lady with history of ascending aortic aneurysm repair in 2010. patient presented for scheduled out patient echo 2019 to assess her known aortic regurgitation and previous history of aortic repairt.  Possible dissection noted in aorta on echo. Patient sent for CTA of chest. Was noted to have possible dissection and intramural thrombus. I consulted Dr. Cotter in the setting of her previous aortic repair. Dr. Cotter reviewed the films and discussed with Dr. Mack at [facility]. Patient was asymptomatic, per Dr. Cotter's recommendations patient was stable for discharged and to f/up with Dr. Mack    However patient worsening shortness of breath over the last 1 year, also an episode of chest pain so she underwent a CTA coronary angiogram which showed potential obstructive disease in the LAD followed by cardiac cath which was without any evidence of obstructive coronary artery disease in the LAD.    Repeat echocardiogram continues to show moderate aortic regurgitation.  Since her symptoms of shortness of breath were getting worse referred her back to [facility] for consideration for aortic valve repair since that is contributing mostly to her symptoms of shortness of breath.    Patient was seen at [facility].,  She had a repeat right and left heart cath, coronaries with  "nonobstructive disease, she was found to have moderate aortic regurg on chocardiogram and filling pressures were reported to be on the higher side.  She was started on Lasix.    Patient was also referred to a cardiologist at [facility].  An MRI was requested for further evaluation of her to \"but her pacemaker leads were not compatible.  There is also discussion of the GUSTAVO that has not been performed.    She has not seen Dr. Mack in a while though.    Patient reported she has been having worsening shortness of breath over the last 1 week.  Has gained 3 to 4 pounds and has trace lower extremity swelling.  She has not been taking her Lasix.    After multiple visits to [facility], patient was not satisfied and wanted a second opinion.  I referred her to [place] with Dr. Hardy.  She had a GUSTAVO done there which showed severe arctic regurgitation.  She is being planned for repeat CTA for planning for aortic valve regurgitation and her known aortic aneurysm.    Shortness of breath is improved with the diuresis.  Blood pressure is optimally controlled at home.  Doing better than before.  Shortness of breath has improved.  Still has limitations with exertion.  Following in [place] at [place] now for consideration for AVR.  Following closely with [facility] for consideration for aortic valve replacement.    08/30/2023:  Patient underwent TAVR using a 23 mm Jessica valve on August 16, 2023 at [place].  Post TAVR echo with normal functioning valve.  LV had global hypokinesis and EF 46 to 50%.  No perivalvular leak noted.  Gradient was normal.    She is feeling remarkably better compared to prior.  Blood pressure has been on the lower side.    Review of Systems - ROS  Constitution: Negative for weakness, weight gain and weight loss.   HENT: Negative for congestion.    Eyes: Negative for blurred vision.   Cardiovascular: As mentioned above  Respiratory: Negative for cough and hemoptysis.    Endocrine: Negative for polydipsia " and polyuria.   Hematologic/Lymphatic: Negative for bleeding problem. Does not bruise/bleed easily.   Skin: Negative for flushing.   Musculoskeletal: Negative for neck pain and stiffness.   Gastrointestinal: Negative for abdominal pain, diarrhea, jaundice, melena, nausea and vomiting.   Genitourinary: Negative for dysuria and hematuria.   Neurological: Negative for dizziness, focal weakness and numbness.   Psychiatric/Behavioral: Negative for altered mental status and depression.     I reviewed the ROS as documented here and confirmed the accuracy of it with the patient today. 8/30/2023        All other systems were reviewed and were negative.    family history includes Heart disease in her father and mother.     reports that she has never smoked. She has never used smokeless tobacco. She reports that she does not drink alcohol and does not use drugs.    Allergies   Allergen Reactions    Hydroxyzine Headache    Ampicillin Rash    Milk-Related Compounds GI Intolerance    Penicillins Rash    Pravastatin Mental Status Change    Sulfa Antibiotics Hives         Current Outpatient Medications:     amLODIPine (NORVASC) 10 MG tablet, TAKE ONE TABLET BY MOUTH DAILY FOR BLOOD PRESSURE, Disp: 90 tablet, Rfl: 3    aspirin 81 MG EC tablet, Take 1 tablet by mouth 2 (Two) Times a Day., Disp: , Rfl:     atorvastatin (LIPITOR) 40 MG tablet, Take 1 tablet by mouth Every Night., Disp: , Rfl:     Cholecalciferol 25 MCG (1000 UT) tablet, Take 1 tablet by mouth Daily., Disp: , Rfl:     dilTIAZem CD (CARDIZEM CD) 120 MG 24 hr capsule, TAKE ONE CAPSULE BY MOUTH DAILY, Disp: 90 capsule, Rfl: 1    furosemide (LASIX) 40 MG tablet, Take 1 tablet by mouth Daily., Disp: 90 tablet, Rfl: 1    isosorbide mononitrate (IMDUR) 120 MG 24 hr tablet, Take 1 tablet by mouth once daily, Disp: 90 tablet, Rfl: 3    lisinopril (PRINIVIL,ZESTRIL) 10 MG tablet, Take 1 tablet by mouth once daily, Disp: 90 tablet, Rfl: 3    nitroglycerin (NITROSTAT) 0.4 MG SL  "tablet, Place 1 tablet under the tongue Every 5 (Five) Minutes As Needed. do not exceed a total of 3 doses in 15 minutes, Disp: , Rfl:     sotalol (BETAPACE) 80 MG tablet, Take 1 tablet by mouth 2 (Two) Times a Day., Disp: 180 tablet, Rfl: 1    spironolactone (ALDACTONE) 25 MG tablet, Take 1 tablet by mouth Daily., Disp: , Rfl:     Physical Exam:  Vitals:    08/30/23 1113   BP: 120/60   BP Location: Left arm   Patient Position: Sitting   Cuff Size: Adult   Pulse: 63   SpO2: 98%   Weight: 78.6 kg (173 lb 3.2 oz)   Height: 162.6 cm (64\")       Current Pain Level: none  Pulse Ox: Normal  on room air  General: alert, appears stated age and cooperative     Body Habitus: well-nourished    HEENT: Head: Normocephalic, no lesions, without obvious abnormality. No arcus senilis, xanthelasma or xanthomas.    Neuro: alert, oriented x3  Pulses: 2+ and symmetric  JVP: Volume/Pulsation: Normal.  Normal waveforms.   Appropriate inspiratory decrease.  No Kussmaul's. No Gerardo's.   Carotid Exam: no bruit normal pulsation bilaterally   Carotid Volume: normal.     Respirations: no increased work of breathing   Chest:  Normal    Pulmonary:Normal   Precordium: Normal impulses. P2 is not palpable.  RV Heave: absent  LV Heave: absent  [place]:  normal size and placement  Palpable S4: absent.  Heart rate: normal    Heart Rhythm: regular     Heart Sounds: S1: normal  S2: normal  S3: absent   S4: absent  Opening Snap: absent   Diastolic murmur audible in aortic area.   Pericardial Rub:  Absent: .    Abdomen:   Appearance: normal .  Palpation: Soft, non-tender to palpation, bowel sounds positive in all four quadrants; no guarding or rebound tenderness  Extremity: no edema.   LE Skin: no rashes  LE Hair:  normal  LE Pulses: well perfused with normal pulses in the distal extremities  Pallor on elevation: Absent. Rubor on dependency: None      DATA REVIEWED:     EKG. I personally reviewed and interpreted the EKG.  venricular paced " rhythm    ECG/EMG Results (all)       None          ---------------------------------------------------  TTE/GUSTAVO:  Results for orders placed in visit on 01/13/22    Adult Transthoracic Echo Complete W/ Cont if Necessary Per Protocol    Interpretation Summary  ú Left ventricular ejection fraction appears to be 56 - 60%. Left ventricular systolic function is normal.  ú Left ventricular diastolic function is consistent with (grade II w/high LAP) pseudonormalization.  ú Left ventricular wall thickness is consistent with mild concentric hypertrophy.  ú Moderate aortic valve regurgitation is present.  ú Left atrial volume is moderately increased.  ú Aneurysmal dilation of the proximal aorta is present. There is an unknown type of proximal aorta graft noted. Ascending aorta measures 4.5cm in diameter (Unchangde from before). Linear echogenic density noted in the ascending aorta which appears unchanged from prior echo. In the setting of previous ascending aortic aneurysm repair most likely related to post-operative changes. Color doppler did not reveal any flow into the space beyond the echogenic density.    No significant interval changes noted.        --------------------------------------------------------------------------------------------------  LABS:     The CVD Risk score (MARIA TERESA'Agostino, et al., 2008) failed to calculate for the following reasons:    The 2008 CVD risk score is only valid for ages 30 to 74    The patient has a prior MI, stroke, CHF, or peripheral vascular disease diagnosis         Lab Results   Component Value Date    GLUCOSE 106 (H) 10/10/2022    BUN 12 10/10/2022    CREATININE 0.82 10/10/2022    EGFRIFNONA 61 10/08/2021    BCR 14.6 10/10/2022    K 3.8 10/10/2022    CO2 28.7 10/10/2022    CALCIUM 9.0 10/10/2022    ALBUMIN 4.40 10/10/2022    AST 26 10/10/2022    ALT 28 10/10/2022     Lab Results   Component Value Date    WBC 8.60 08/17/2023    HGB 12.0 08/17/2023    HCT 33.6 (L) 08/17/2023    MCV 93.1  08/17/2023     (L) 08/17/2023     Lab Results   Component Value Date    CHOL 185 11/03/2020    CHLPL 143 06/17/2016    TRIG 87 11/03/2020    HDL 48 11/03/2020     (H) 11/03/2020     Lab Results   Component Value Date    TSH 1.600 10/13/2020     No results found for: CKTOTAL, CKMB, CKMBINDEX, TROPONINI, TROPONINT  Lab Results   Component Value Date    HGBA1C 5.8 (H) 06/17/2016     No results found for: DDIMER  Lab Results   Component Value Date    ALT 28 10/10/2022     Lab Results   Component Value Date    HGBA1C 5.8 (H) 06/17/2016     Lab Results   Component Value Date    CREATININE 0.82 10/10/2022     No results found for: IRON, TIBC, FERRITIN  Lab Results   Component Value Date    INR 1.13 (H) 08/16/2023    INR 0.95 08/15/2023    INR 0.98 02/04/2021    PROTIME 13.4 02/04/2021    PROTIME 13.4 12/16/2020    PROTIME 13.0 12/01/2020       Assessment/Plan     1. Status post ascending aortic aneurysm repair:  Concerning findings on echo and CT as above.  Had developed severe aortic regurgitation.  Underwent TAVR at [facility] in [place].    2.  Aortic regurgitation:  Recent GUSTAVO at [place] showed severe aortic regurgitation.  In the setting of known aortic aneurysm.  Subsequently underwent TAVR with Mandie valve for severe arctic regurgitation.  Continue aspirin    2. Hypertension:  Amlodipine  10 mg we will back off to 5 mg if starts becoming symptomatic with hypotension.  Cardizem 120 mg  Imdur 120 mg  lisinopril 10 mg  Spironolactone 25mg  Sotalol 80mg BID    Target blood pressure 120 x 80.    3. Hyperlipidemia LDL>100, will increased lipitor to 40mgt.     4. sick sinus syndrome status post dual-chamber pacemaker, on sotalol for paroxysmal atrial fibrillation and remains in sinus rhythm.  Status post recent generator change in March 2021.    5.  Paroxysmal A. fib  Previously diagnosed, however recent pacemaker check showed that she was in normal sinus rhythm.  Has been on sotalol for rhythm  control.  CHADSVASC is 4.   She has not been on anticoagulation, since she has a pacemaker and there is reliable source of there is no recurrence we will continue to hold off anti-coagulation. Also in the setting of her aortic dilation, its ok to hold off AC.     6.  Coronary artery disease:  Cardiac cath with mild nonobstructive disease and congestive heart failure preserved ejection fraction:  Optimal blood pressure control as above.    7.  Shortness of breath: Improved after TAVR  Multifactorial from aortic regurgitation and congestive heart failure preserved ejection fraction:  Optimal blood pressure control as above.  Starting her on diuretics as above with close follow-up.      Prevention:  Patient's Body mass index is 29.73 kg/mý. BMI is above normal parameters. Recommendations include: exercise counseling and nutrition counseling.      Abril Carranza Raghav is not a smoker    AAA Screening:   Not needed            This document has been electronically signed by Marquita Weinberg MD on August 30, 2023 11:17 CDT

## 2023-09-01 LAB
QT INTERVAL: 452 MS
QTC INTERVAL: 462 MS

## 2023-09-14 RX ORDER — SOTALOL HYDROCHLORIDE 80 MG/1
80 TABLET ORAL 2 TIMES DAILY
Qty: 180 TABLET | Refills: 1 | Status: SHIPPED | OUTPATIENT
Start: 2023-09-14

## 2023-09-19 ENCOUNTER — TELEPHONE (OUTPATIENT)
Dept: CARDIOLOGY | Facility: CLINIC | Age: 78
End: 2023-09-19
Payer: MEDICARE

## 2023-09-19 NOTE — TELEPHONE ENCOUNTER
Patient contacted the office stating she has been fatigued due to hypotension. At her last office visit dr. Weinberg states should she become symptomatic due to low bp she can reduce her dose of amlodipine from 10 mg to 5mg. The patient verbalized understanding.

## (undated) DEVICE — Device: Brand: DISPOSABLE ELECTROSURGICAL SNARE

## (undated) DEVICE — PK CATH LAB 60

## (undated) DEVICE — SINGLE-USE BIOPSY FORCEPS: Brand: RADIAL JAW 4

## (undated) DEVICE — RUNTHROUGH NS EXTRA FLOPPY PTCA GUIDEWIRE: Brand: RUNTHROUGH

## (undated) DEVICE — BAND BAG 36" X 25": Brand: STERIMED

## (undated) DEVICE — KT INTRO MINISTICK MAX W/GW NITNL/TUNG ECHO 4F 21G 7CM

## (undated) DEVICE — X-DRAPE ABS 12"X17" .25MM LEAD EQUIV STERILE X-RAY SHIELD 10/BOX: Brand: X-DRAPE

## (undated) DEVICE — ST CVR PROB PULLUP ULTRASND 5X48IN

## (undated) DEVICE — CATH DIAG EXPO M/ PK 6FR FL4/FR4 PIG 3PK

## (undated) DEVICE — GW PERIPH GUIDERIGHT STD/EXCHNG/J/TIP SS 0.038IN 5X260CM

## (undated) DEVICE — DESTINATION PERIPHERAL GUIDING SHEATH: Brand: DESTINATION

## (undated) DEVICE — CATH DIAG EXPO .056 FL5 6F 100CM

## (undated) DEVICE — ANGIO-SEAL VIP VASCULAR CLOSURE DEVICE: Brand: ANGIO-SEAL

## (undated) DEVICE — ELECTRODE,RT,STRESS,FOAM,50PK: Brand: MEDLINE

## (undated) DEVICE — DEV INFL MONARCH 20ML

## (undated) DEVICE — CANN SMPL SOFTECH BIFLO ETCO2 A/M 7FT

## (undated) DEVICE — MODEL BT2000 P/N 700287-012KIT CONTENTS: MANIFOLD WITH SALINE AND CONTRAST PORTS, SALINE TUBING WITH SPIKE AND HAND SYRINGE, TRANSDUCER: Brand: BT2000 AUTOMATED MANIFOLD KIT

## (undated) DEVICE — INTRO SHEATH ART/FEM ENGAGE .038 6F12CM

## (undated) DEVICE — COPILOT KIT INCLUDES BLEEDBACK CONTROL VALVE / GUIDE WIRE INTRODUCER / TORQUE DEVICE: Brand: ACCESSORIES

## (undated) DEVICE — TRAP SXN POLYP QUICKCATCH LF

## (undated) DEVICE — PK PM 60